# Patient Record
Sex: FEMALE | Race: BLACK OR AFRICAN AMERICAN | NOT HISPANIC OR LATINO | URBAN - METROPOLITAN AREA
[De-identification: names, ages, dates, MRNs, and addresses within clinical notes are randomized per-mention and may not be internally consistent; named-entity substitution may affect disease eponyms.]

---

## 2017-11-15 ENCOUNTER — EMERGENCY (EMERGENCY)
Facility: HOSPITAL | Age: 52
LOS: 0 days | Discharge: HOME | End: 2017-11-15
Attending: EMERGENCY MEDICINE
Payer: COMMERCIAL

## 2017-11-15 VITALS
HEIGHT: 66 IN | HEART RATE: 69 BPM | SYSTOLIC BLOOD PRESSURE: 151 MMHG | TEMPERATURE: 98 F | WEIGHT: 188.05 LBS | OXYGEN SATURATION: 99 % | RESPIRATION RATE: 16 BRPM | DIASTOLIC BLOOD PRESSURE: 91 MMHG

## 2017-11-15 VITALS
DIASTOLIC BLOOD PRESSURE: 83 MMHG | HEART RATE: 81 BPM | RESPIRATION RATE: 17 BRPM | SYSTOLIC BLOOD PRESSURE: 127 MMHG | TEMPERATURE: 97 F | OXYGEN SATURATION: 100 %

## 2017-11-15 DIAGNOSIS — Z88.5 ALLERGY STATUS TO NARCOTIC AGENT: ICD-10-CM

## 2017-11-15 DIAGNOSIS — F32.9 MAJOR DEPRESSIVE DISORDER, SINGLE EPISODE, UNSPECIFIED: ICD-10-CM

## 2017-11-15 DIAGNOSIS — G45.9 TRANSIENT CEREBRAL ISCHEMIC ATTACK, UNSPECIFIED: ICD-10-CM

## 2017-11-15 DIAGNOSIS — R42 DIZZINESS AND GIDDINESS: ICD-10-CM

## 2017-11-15 DIAGNOSIS — I20.9 ANGINA PECTORIS, UNSPECIFIED: ICD-10-CM

## 2017-11-15 DIAGNOSIS — Z79.1 LONG TERM (CURRENT) USE OF NON-STEROIDAL ANTI-INFLAMMATORIES (NSAID): ICD-10-CM

## 2017-11-15 DIAGNOSIS — Z79.01 LONG TERM (CURRENT) USE OF ANTICOAGULANTS: ICD-10-CM

## 2017-11-15 DIAGNOSIS — Z90.710 ACQUIRED ABSENCE OF BOTH CERVIX AND UTERUS: Chronic | ICD-10-CM

## 2017-11-15 DIAGNOSIS — I10 ESSENTIAL (PRIMARY) HYPERTENSION: ICD-10-CM

## 2017-11-15 DIAGNOSIS — D64.9 ANEMIA, UNSPECIFIED: ICD-10-CM

## 2017-11-15 LAB
ALBUMIN SERPL ELPH-MCNC: 3.4 G/DL — SIGNIFICANT CHANGE UP (ref 3.3–5)
ALP SERPL-CCNC: 67 U/L — SIGNIFICANT CHANGE UP (ref 40–120)
ALT FLD-CCNC: 29 U/L — SIGNIFICANT CHANGE UP (ref 12–78)
ANION GAP SERPL CALC-SCNC: 5 MMOL/L — SIGNIFICANT CHANGE UP (ref 5–17)
APPEARANCE UR: CLEAR — SIGNIFICANT CHANGE UP
AST SERPL-CCNC: 19 U/L — SIGNIFICANT CHANGE UP (ref 15–37)
BASOPHILS # BLD AUTO: 0.1 K/UL — SIGNIFICANT CHANGE UP (ref 0–0.2)
BASOPHILS NFR BLD AUTO: 1.2 % — SIGNIFICANT CHANGE UP (ref 0–2)
BILIRUB SERPL-MCNC: 0.2 MG/DL — SIGNIFICANT CHANGE UP (ref 0.2–1.2)
BILIRUB UR-MCNC: NEGATIVE — SIGNIFICANT CHANGE UP
BUN SERPL-MCNC: 11 MG/DL — SIGNIFICANT CHANGE UP (ref 7–23)
CALCIUM SERPL-MCNC: 8.4 MG/DL — LOW (ref 8.5–10.1)
CHLORIDE SERPL-SCNC: 106 MMOL/L — SIGNIFICANT CHANGE UP (ref 96–108)
CO2 SERPL-SCNC: 31 MMOL/L — SIGNIFICANT CHANGE UP (ref 22–31)
COLOR SPEC: YELLOW — SIGNIFICANT CHANGE UP
CREAT SERPL-MCNC: 0.57 MG/DL — SIGNIFICANT CHANGE UP (ref 0.5–1.3)
DIFF PNL FLD: NEGATIVE — SIGNIFICANT CHANGE UP
EOSINOPHIL # BLD AUTO: 0.2 K/UL — SIGNIFICANT CHANGE UP (ref 0–0.5)
EOSINOPHIL NFR BLD AUTO: 3.5 % — SIGNIFICANT CHANGE UP (ref 0–6)
GLUCOSE SERPL-MCNC: 93 MG/DL — SIGNIFICANT CHANGE UP (ref 70–99)
GLUCOSE UR QL: NEGATIVE MG/DL — SIGNIFICANT CHANGE UP
HCG SERPL-ACNC: <1 MIU/ML — SIGNIFICANT CHANGE UP
HCT VFR BLD CALC: 37.7 % — SIGNIFICANT CHANGE UP (ref 34.5–45)
HGB BLD-MCNC: 11.4 G/DL — LOW (ref 11.5–15.5)
KETONES UR-MCNC: NEGATIVE — SIGNIFICANT CHANGE UP
LEUKOCYTE ESTERASE UR-ACNC: NEGATIVE — SIGNIFICANT CHANGE UP
LYMPHOCYTES # BLD AUTO: 2.3 K/UL — SIGNIFICANT CHANGE UP (ref 1–3.3)
LYMPHOCYTES # BLD AUTO: 41.2 % — SIGNIFICANT CHANGE UP (ref 13–44)
MCHC RBC-ENTMCNC: 23.4 PG — LOW (ref 27–34)
MCHC RBC-ENTMCNC: 30.2 GM/DL — LOW (ref 32–36)
MCV RBC AUTO: 77.7 FL — LOW (ref 80–100)
MONOCYTES # BLD AUTO: 0.6 K/UL — SIGNIFICANT CHANGE UP (ref 0–0.9)
MONOCYTES NFR BLD AUTO: 9.9 % — SIGNIFICANT CHANGE UP (ref 2–14)
NEUTROPHILS # BLD AUTO: 2.5 K/UL — SIGNIFICANT CHANGE UP (ref 1.8–7.4)
NEUTROPHILS NFR BLD AUTO: 44.2 % — SIGNIFICANT CHANGE UP (ref 43–77)
NITRITE UR-MCNC: NEGATIVE — SIGNIFICANT CHANGE UP
PH UR: 7 — SIGNIFICANT CHANGE UP (ref 5–8)
PLATELET # BLD AUTO: 123 K/UL — LOW (ref 150–400)
POTASSIUM SERPL-MCNC: 3.6 MMOL/L — SIGNIFICANT CHANGE UP (ref 3.5–5.3)
POTASSIUM SERPL-SCNC: 3.6 MMOL/L — SIGNIFICANT CHANGE UP (ref 3.5–5.3)
PROT SERPL-MCNC: 7.2 GM/DL — SIGNIFICANT CHANGE UP (ref 6–8.3)
PROT UR-MCNC: NEGATIVE MG/DL — SIGNIFICANT CHANGE UP
RBC # BLD: 4.86 M/UL — SIGNIFICANT CHANGE UP (ref 3.8–5.2)
RBC # FLD: 13 % — SIGNIFICANT CHANGE UP (ref 11–15)
SODIUM SERPL-SCNC: 142 MMOL/L — SIGNIFICANT CHANGE UP (ref 135–145)
SP GR SPEC: 1.01 — SIGNIFICANT CHANGE UP (ref 1.01–1.02)
UROBILINOGEN FLD QL: NEGATIVE MG/DL — SIGNIFICANT CHANGE UP
WBC # BLD: 5.6 K/UL — SIGNIFICANT CHANGE UP (ref 3.8–10.5)
WBC # FLD AUTO: 5.6 K/UL — SIGNIFICANT CHANGE UP (ref 3.8–10.5)

## 2017-11-15 PROCEDURE — 70450 CT HEAD/BRAIN W/O DYE: CPT | Mod: 26

## 2017-11-15 PROCEDURE — 99285 EMERGENCY DEPT VISIT HI MDM: CPT

## 2017-11-15 RX ORDER — MECLIZINE HCL 12.5 MG
50 TABLET ORAL ONCE
Qty: 0 | Refills: 0 | Status: COMPLETED | OUTPATIENT
Start: 2017-11-15 | End: 2017-11-15

## 2017-11-15 RX ORDER — MECLIZINE HCL 12.5 MG
1 TABLET ORAL
Qty: 15 | Refills: 0 | OUTPATIENT
Start: 2017-11-15 | End: 2017-11-20

## 2017-11-15 RX ADMIN — Medication 50 MILLIGRAM(S): at 13:31

## 2017-11-15 NOTE — ED ADULT TRIAGE NOTE - CHIEF COMPLAINT QUOTE
Pt states she was given an Abilify injection on 11/5 and since then she has been dizzy restless and unable to focus. Pt c/o chest discomfort with right upper back pain

## 2017-11-15 NOTE — ED PROVIDER NOTE - OBJECTIVE STATEMENT
52 year old female with PMH of HTN, TIA hx depression with anemia, angina hx presenting to ED due to room spinning sensation for past few days, she thought may be caused by abilify injection - however injection was 10 days ago, not likely to be cause, pt has had some ringing on left ear in recent past as well. Pt was admitted to psychiatric services last time due to hearing voices telling her to hurt herself, today hears voices but ascribes no suicidal or homicidal thoughts would not like further meds for voices.

## 2017-11-15 NOTE — ED PROVIDER NOTE - MEDICAL DECISION MAKING DETAILS
pt complaining of dizziness -vertigo given meclizine - pt complaining of dizziness -vertigo given meclizine - improved and will dc with Dr. Fontaine followup - likely [place] vs positional vertigo.

## 2017-11-16 LAB
CULTURE RESULTS: SIGNIFICANT CHANGE UP
SPECIMEN SOURCE: SIGNIFICANT CHANGE UP

## 2018-05-13 ENCOUNTER — EMERGENCY (EMERGENCY)
Facility: HOSPITAL | Age: 53
LOS: 0 days | Discharge: HOME | End: 2018-05-13
Attending: EMERGENCY MEDICINE
Payer: COMMERCIAL

## 2018-05-13 VITALS
DIASTOLIC BLOOD PRESSURE: 96 MMHG | TEMPERATURE: 99 F | RESPIRATION RATE: 16 BRPM | SYSTOLIC BLOOD PRESSURE: 171 MMHG | OXYGEN SATURATION: 97 % | HEIGHT: 66 IN | HEART RATE: 83 BPM | WEIGHT: 203.05 LBS

## 2018-05-13 DIAGNOSIS — Z90.710 ACQUIRED ABSENCE OF BOTH CERVIX AND UTERUS: Chronic | ICD-10-CM
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
## 2018-05-13 DIAGNOSIS — I20.9 ANGINA PECTORIS, UNSPECIFIED: ICD-10-CM

## 2018-05-13 DIAGNOSIS — Z79.1 LONG TERM (CURRENT) USE OF NON-STEROIDAL ANTI-INFLAMMATORIES (NSAID): ICD-10-CM

## 2018-05-13 DIAGNOSIS — M25.511 PAIN IN RIGHT SHOULDER: ICD-10-CM

## 2018-05-13 DIAGNOSIS — S43.401A UNSPECIFIED SPRAIN OF RIGHT SHOULDER JOINT, INITIAL ENCOUNTER: ICD-10-CM

## 2018-05-13 DIAGNOSIS — Z86.73 PERSONAL HISTORY OF TRANSIENT ISCHEMIC ATTACK (TIA), AND CEREBRAL INFARCTION WITHOUT RESIDUAL DEFICITS: ICD-10-CM

## 2018-05-13 DIAGNOSIS — Z88.8 ALLERGY STATUS TO OTHER DRUGS, MEDICAMENTS AND BIOLOGICAL SUBSTANCES: ICD-10-CM

## 2018-05-13 DIAGNOSIS — I10 ESSENTIAL (PRIMARY) HYPERTENSION: ICD-10-CM

## 2018-05-13 DIAGNOSIS — F32.9 MAJOR DEPRESSIVE DISORDER, SINGLE EPISODE, UNSPECIFIED: ICD-10-CM

## 2018-05-13 DIAGNOSIS — Z79.02 LONG TERM (CURRENT) USE OF ANTITHROMBOTICS/ANTIPLATELETS: ICD-10-CM

## 2018-05-13 DIAGNOSIS — Z79.899 OTHER LONG TERM (CURRENT) DRUG THERAPY: ICD-10-CM
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
## 2018-05-13 DIAGNOSIS — D64.9 ANEMIA, UNSPECIFIED: ICD-10-CM

## 2018-05-13 PROCEDURE — 99283 EMERGENCY DEPT VISIT LOW MDM: CPT

## 2018-05-13 PROCEDURE — 73030 X-RAY EXAM OF SHOULDER: CPT | Mod: 26,RT

## 2018-05-13 RX ORDER — IBUPROFEN 200 MG
600 TABLET ORAL ONCE
Qty: 0 | Refills: 0 | Status: COMPLETED | OUTPATIENT
Start: 2018-05-13 | End: 2018-05-13

## 2018-05-13 RX ADMIN — Medication 600 MILLIGRAM(S): at 16:41

## 2018-05-13 NOTE — ED ADULT TRIAGE NOTE - CHIEF COMPLAINT QUOTE
pt c/o throbbing right shoulder pain for a few days denies any injury. Pt c/o painful ROM, denies any chest pain at triage

## 2018-05-13 NOTE — ED PROVIDER NOTE - CARE PLAN
Principal Discharge DX:	Shoulder strain, right, initial encounter  Secondary Diagnosis:	Elevated blood pressure reading

## 2019-07-02 ENCOUNTER — EMERGENCY (EMERGENCY)
Facility: HOSPITAL | Age: 54
LOS: 0 days | Discharge: HOME | End: 2019-07-03
Attending: EMERGENCY MEDICINE
Payer: COMMERCIAL

## 2019-07-02 VITALS
WEIGHT: 162.92 LBS | OXYGEN SATURATION: 100 % | HEART RATE: 136 BPM | SYSTOLIC BLOOD PRESSURE: 174 MMHG | TEMPERATURE: 99 F | HEIGHT: 65 IN | DIASTOLIC BLOOD PRESSURE: 85 MMHG | RESPIRATION RATE: 22 BRPM

## 2019-07-02 DIAGNOSIS — R42 DIZZINESS AND GIDDINESS: ICD-10-CM

## 2019-07-02 DIAGNOSIS — R00.2 PALPITATIONS: ICD-10-CM

## 2019-07-02 DIAGNOSIS — I10 ESSENTIAL (PRIMARY) HYPERTENSION: ICD-10-CM

## 2019-07-02 DIAGNOSIS — Z88.8 ALLERGY STATUS TO OTHER DRUGS, MEDICAMENTS AND BIOLOGICAL SUBSTANCES: ICD-10-CM

## 2019-07-02 DIAGNOSIS — Z86.73 PERSONAL HISTORY OF TRANSIENT ISCHEMIC ATTACK (TIA), AND CEREBRAL INFARCTION WITHOUT RESIDUAL DEFICITS: ICD-10-CM

## 2019-07-02 DIAGNOSIS — R53.1 WEAKNESS: ICD-10-CM

## 2019-07-02 DIAGNOSIS — F99 MENTAL DISORDER, NOT OTHERWISE SPECIFIED: ICD-10-CM

## 2019-07-02 DIAGNOSIS — E87.6 HYPOKALEMIA: ICD-10-CM

## 2019-07-02 DIAGNOSIS — Z90.710 ACQUIRED ABSENCE OF BOTH CERVIX AND UTERUS: Chronic | ICD-10-CM

## 2019-07-02 LAB
ALBUMIN SERPL ELPH-MCNC: 3.6 G/DL — SIGNIFICANT CHANGE UP (ref 3.3–5)
ALP SERPL-CCNC: 57 U/L — SIGNIFICANT CHANGE UP (ref 40–120)
ALT FLD-CCNC: 19 U/L — SIGNIFICANT CHANGE UP (ref 12–78)
AMPHET UR-MCNC: NEGATIVE — SIGNIFICANT CHANGE UP
ANION GAP SERPL CALC-SCNC: 12 MMOL/L — SIGNIFICANT CHANGE UP (ref 5–17)
APPEARANCE UR: CLEAR — SIGNIFICANT CHANGE UP
APTT BLD: 31.7 SEC — SIGNIFICANT CHANGE UP (ref 28.5–37)
AST SERPL-CCNC: 17 U/L — SIGNIFICANT CHANGE UP (ref 15–37)
BARBITURATES UR SCN-MCNC: NEGATIVE — SIGNIFICANT CHANGE UP
BENZODIAZ UR-MCNC: NEGATIVE — SIGNIFICANT CHANGE UP
BILIRUB SERPL-MCNC: 0.2 MG/DL — SIGNIFICANT CHANGE UP (ref 0.2–1.2)
BILIRUB UR-MCNC: NEGATIVE — SIGNIFICANT CHANGE UP
BUN SERPL-MCNC: 11 MG/DL — SIGNIFICANT CHANGE UP (ref 7–23)
CALCIUM SERPL-MCNC: 8.5 MG/DL — SIGNIFICANT CHANGE UP (ref 8.5–10.1)
CHLORIDE SERPL-SCNC: 104 MMOL/L — SIGNIFICANT CHANGE UP (ref 96–108)
CK MB CFR SERPL CALC: 1.5 NG/ML — SIGNIFICANT CHANGE UP (ref 0.5–3.6)
CO2 SERPL-SCNC: 25 MMOL/L — SIGNIFICANT CHANGE UP (ref 22–31)
COCAINE METAB.OTHER UR-MCNC: NEGATIVE — SIGNIFICANT CHANGE UP
COLOR SPEC: YELLOW — SIGNIFICANT CHANGE UP
CREAT SERPL-MCNC: 0.76 MG/DL — SIGNIFICANT CHANGE UP (ref 0.5–1.3)
D DIMER BLD IA.RAPID-MCNC: 253 NG/ML DDU — HIGH
DIFF PNL FLD: NEGATIVE — SIGNIFICANT CHANGE UP
ETHANOL SERPL-MCNC: <10 MG/DL — SIGNIFICANT CHANGE UP (ref 0–10)
GLUCOSE BLDC GLUCOMTR-MCNC: 163 MG/DL — HIGH (ref 70–99)
GLUCOSE SERPL-MCNC: 180 MG/DL — HIGH (ref 70–99)
GLUCOSE UR QL: NEGATIVE MG/DL — SIGNIFICANT CHANGE UP
HCG SERPL-ACNC: 1 MIU/ML — SIGNIFICANT CHANGE UP
HCT VFR BLD CALC: 37.2 % — SIGNIFICANT CHANGE UP (ref 34.5–45)
HGB BLD-MCNC: 11.2 G/DL — LOW (ref 11.5–15.5)
INR BLD: 1.07 RATIO — SIGNIFICANT CHANGE UP (ref 0.88–1.16)
KETONES UR-MCNC: NEGATIVE — SIGNIFICANT CHANGE UP
LACTATE SERPL-SCNC: 1.7 MMOL/L — SIGNIFICANT CHANGE UP (ref 0.7–2)
LEUKOCYTE ESTERASE UR-ACNC: NEGATIVE — SIGNIFICANT CHANGE UP
MCHC RBC-ENTMCNC: 22.3 PG — LOW (ref 27–34)
MCHC RBC-ENTMCNC: 30.1 GM/DL — LOW (ref 32–36)
MCV RBC AUTO: 74.1 FL — LOW (ref 80–100)
METHADONE UR-MCNC: NEGATIVE — SIGNIFICANT CHANGE UP
NITRITE UR-MCNC: NEGATIVE — SIGNIFICANT CHANGE UP
NRBC # BLD: 0 /100 WBCS — SIGNIFICANT CHANGE UP (ref 0–0)
NT-PROBNP SERPL-SCNC: 107 PG/ML — SIGNIFICANT CHANGE UP (ref 0–125)
OPIATES UR-MCNC: NEGATIVE — SIGNIFICANT CHANGE UP
PCP SPEC-MCNC: SIGNIFICANT CHANGE UP
PCP UR-MCNC: NEGATIVE — SIGNIFICANT CHANGE UP
PH UR: 7 — SIGNIFICANT CHANGE UP (ref 5–8)
PLATELET # BLD AUTO: 191 K/UL — SIGNIFICANT CHANGE UP (ref 150–400)
POTASSIUM SERPL-MCNC: 3 MMOL/L — LOW (ref 3.5–5.3)
POTASSIUM SERPL-SCNC: 3 MMOL/L — LOW (ref 3.5–5.3)
PROT SERPL-MCNC: 7.9 GM/DL — SIGNIFICANT CHANGE UP (ref 6–8.3)
PROT UR-MCNC: NEGATIVE MG/DL — SIGNIFICANT CHANGE UP
PROTHROM AB SERPL-ACNC: 12 SEC — SIGNIFICANT CHANGE UP (ref 10–12.9)
RBC # BLD: 5.02 M/UL — SIGNIFICANT CHANGE UP (ref 3.8–5.2)
RBC # FLD: 14.6 % — HIGH (ref 10.3–14.5)
SODIUM SERPL-SCNC: 141 MMOL/L — SIGNIFICANT CHANGE UP (ref 135–145)
SP GR SPEC: 1.01 — SIGNIFICANT CHANGE UP (ref 1.01–1.02)
THC UR QL: NEGATIVE — SIGNIFICANT CHANGE UP
TROPONIN I SERPL-MCNC: <.015 NG/ML — SIGNIFICANT CHANGE UP (ref 0.01–0.04)
UROBILINOGEN FLD QL: NEGATIVE MG/DL — SIGNIFICANT CHANGE UP
WBC # BLD: 6.84 K/UL — SIGNIFICANT CHANGE UP (ref 3.8–10.5)
WBC # FLD AUTO: 6.84 K/UL — SIGNIFICANT CHANGE UP (ref 3.8–10.5)

## 2019-07-02 PROCEDURE — 99285 EMERGENCY DEPT VISIT HI MDM: CPT

## 2019-07-02 PROCEDURE — 70450 CT HEAD/BRAIN W/O DYE: CPT | Mod: 26

## 2019-07-02 PROCEDURE — 93010 ELECTROCARDIOGRAM REPORT: CPT

## 2019-07-02 PROCEDURE — 71045 X-RAY EXAM CHEST 1 VIEW: CPT | Mod: 26

## 2019-07-02 RX ORDER — SODIUM CHLORIDE 9 MG/ML
1000 INJECTION INTRAMUSCULAR; INTRAVENOUS; SUBCUTANEOUS ONCE
Refills: 0 | Status: COMPLETED | OUTPATIENT
Start: 2019-07-02 | End: 2019-07-02

## 2019-07-02 RX ORDER — POTASSIUM CHLORIDE 20 MEQ
10 PACKET (EA) ORAL ONCE
Refills: 0 | Status: COMPLETED | OUTPATIENT
Start: 2019-07-02 | End: 2019-07-02

## 2019-07-02 RX ORDER — LABETALOL HCL 100 MG
10 TABLET ORAL ONCE
Refills: 0 | Status: COMPLETED | OUTPATIENT
Start: 2019-07-02 | End: 2019-07-02

## 2019-07-02 RX ORDER — POTASSIUM CHLORIDE 20 MEQ
40 PACKET (EA) ORAL ONCE
Refills: 0 | Status: COMPLETED | OUTPATIENT
Start: 2019-07-02 | End: 2019-07-02

## 2019-07-02 RX ADMIN — Medication 100 MILLIEQUIVALENT(S): at 23:29

## 2019-07-02 RX ADMIN — Medication 10 MILLIGRAM(S): at 23:30

## 2019-07-02 RX ADMIN — Medication 40 MILLIEQUIVALENT(S): at 23:29

## 2019-07-02 RX ADMIN — SODIUM CHLORIDE 1000 MILLILITER(S): 9 INJECTION INTRAMUSCULAR; INTRAVENOUS; SUBCUTANEOUS at 19:42

## 2019-07-02 NOTE — ED PROVIDER NOTE - PROGRESS NOTE DETAILS
Results reported to patient--hypok replaced, CTs show no acute pathology, urine clean, other labs wnl  Pt. reports feeling better after meds, no events on monitor during stay, vital normalized   pt. agrees to f/u with primary care outpt. urgently for f/u  pt. understands to return to ED if symptoms worsen; will d/c w/ potassium supplementation Results reported to patient--hypok replaced, CTs show no acute pathology, urine clean, other labs wnl  Pt. reports feeling better after meds, no events on monitor during stay, vitals normalized   pt. agrees to f/u with primary care outpt. urgently for f/u  pt. understands to return to ED if symptoms worsen; will d/c w/ potassium supplementation  pt. verbalizes understanding that hypokalemia can cause severe effects if untreated palpitations and even cardiac arrest, pt. understands the importance of taking potassium supplementation and f/u with pcp

## 2019-07-02 NOTE — ED PROVIDER NOTE - PHYSICAL EXAMINATION
Vitals: tachy at 136, htn at 174/85, otherwise WNL  Gen: AAOx3, NAD, sitting comfortably in stretcher, calm, cooperative, non-toxic   Head: ncat, perrla, eomi b/l  Neck: supple, no lymphadenopathy, no midline deviation  Heart: rrr, no m/r/g  Lungs: CTA b/l, no rales/ronchi/wheezes  Abd: soft, nontender, non-distended, no rebound or guarding  Ext: no clubbing/cyanosis/edema  Neuro: sensation and muscle strength intact b/l, steady gait, CN2-12 intact b/l, no focal deficits

## 2019-07-02 NOTE — ED PROVIDER NOTE - INTERPRETATION
EKG performed in ED, sinus tach at 103, No acute ischemic changes, normal intervals, generally flat TWs noted

## 2019-07-02 NOTE — ED PROVIDER NOTE - CARE PROVIDER_API CALL
Da Alcantar)  Internal Medicine  [address], [address]  [place] [address]  Phone: [phone]  Fax: [phone]  Follow Up Time: 1-3 Days

## 2019-07-02 NOTE — ED PROVIDER NOTE - OBJECTIVE STATEMENT
53 yo F with palpitations, lightheadedness, general weakness, palpitations.  Symptoms started at 7pm while pt. was on lunch break at work.  Pt. presents to ER initially tachycardic in 150's, which improved spontaneously.  Pt. denies inciting event.  When symptoms began she felt her heart was beating out of her chest and she felt sob.  Stroke eval called at triage, no neuro deficits noted, stroke alert not called.  No other complaints.   ROS: negative for fever, cough, headache, chest pain, abd pain, nausea, vomiting, diarrhea, rash, and paresthesia--all other systems reviewed are negative.   PMH: negative; Meds: Denies; SH: Denies smoking/drinking/drug use 55 yo F with palpitations, lightheadedness, general weakness, palpitations.  Symptoms started at 7pm while pt. was on lunch break at work.  Pt. presents to ER initially tachycardic in 150's, which improved spontaneously.  Pt. denies inciting event.  When symptoms began she felt her heart was beating out of her chest and she felt sob.  Stroke eval called at triage, no neuro deficits noted, stroke alert not called.  No other complaints.   ROS: negative for fever, cough, headache, chest pain, abd pain, nausea, vomiting, diarrhea, rash, and paresthesia--all other systems reviewed are negative.   PMH: HTN, hx CVA, psych; Meds: rispirdal, zoloft, atenolol, procardia, aldactone, plavix, ecotrin, depakote; SH: Denies smoking/drinking/drug use 53 yo F with palpitations, lightheadedness, general weakness, palpitations.  Symptoms started at 7pm while pt. was on lunch break at work.  Pt. presents to ER initially tachycardic in 150's, which improved spontaneously.  Pt. denies inciting event.  When symptoms began she felt her heart was beating out of her chest and she felt sob.  Stroke eval called at triage, no neuro deficits noted, stroke alert not called.  No other complaints.   ROS: negative for fever, cough, headache, chest pain, abd pain, nausea, vomiting, diarrhea, rash, and paresthesia--all other systems reviewed are negative.   PMH: HTN, hx CVA, psych; Meds: Risperdal, zoloft, atenolol, procardia, aldactone, Plavix, ecotrin, Depakote; SH: Denies smoking/drinking/drug use 53 yo F with palpitations, lightheadedness, general weakness, palpitations.  Symptoms started at 7pm while pt. was on lunch break at work.  Pt. presents to ER initially tachycardic in 150's, which improved spontaneously.  Pt. denies inciting event.  When symptoms began she felt her heart was beating out of her chest and she felt sob.  Stroke eval called at triage, no neuro deficits noted, stroke alert not called.  No other complaints.   ROS: negative for fever, cough, headache, chest pain, abd pain, nausea, vomiting, diarrhea, rash, and paresthesia--all other systems reviewed are negative.   PMH: HTN, hx CVA, psych, hypokalemia (when asked about hx, not on replacement); Meds: Risperdal, zoloft, atenolol, procardia, aldactone, Plavix, ecotrin, Depakote; SH: Denies smoking/drinking/drug use

## 2019-07-02 NOTE — ED PROVIDER NOTE - CARE PLAN
Principal Discharge DX:	Palpitations Principal Discharge DX:	Palpitations  Secondary Diagnosis:	Hypokalemia

## 2019-07-02 NOTE — ED PROVIDER NOTE - CLINICAL SUMMARY MEDICAL DECISION MAKING FREE TEXT BOX
55 yo F with palpitations, r/o ACS, etoh intox, drug intox, electrolyte disturbance, pregnancy, 53 yo F with palpitations, r/o ACS, etoh intox, drug intox, electrolyte disturbance, pregnancy, acute ischemia  -basic labs, coags, trop, ckmb, drug screen, ua, cx, dimer, hcg, CTA chest if positive dimer, cxr, ct brain for weakness, control pressure labetalol prn, ns hydration, potassium repletion prn  -f/u results, reeval

## 2019-07-02 NOTE — ED ADULT TRIAGE NOTE - CHIEF COMPLAINT QUOTE
Patient brought down on stretcher: patient was on Lunch break when she complained of right sided weakness, dizziness and palpitations. Symptoms began at 1855hr. Per accompanying staff HR was 153. Currently patient reports palpitations and dizziness. No apparent neuro deficits. . Patient with history of CVA, HTN and diabetes. Stroke evaluation by Dr. Lee

## 2019-07-03 ENCOUNTER — INBOUND DOCUMENT (OUTPATIENT)
Age: 54
End: 2019-07-03

## 2019-07-03 VITALS
OXYGEN SATURATION: 100 % | RESPIRATION RATE: 16 BRPM | SYSTOLIC BLOOD PRESSURE: 143 MMHG | HEART RATE: 82 BPM | TEMPERATURE: 99 F | DIASTOLIC BLOOD PRESSURE: 87 MMHG

## 2019-07-03 LAB
CULTURE RESULTS: SIGNIFICANT CHANGE UP
SPECIMEN SOURCE: SIGNIFICANT CHANGE UP

## 2019-07-03 PROCEDURE — 71275 CT ANGIOGRAPHY CHEST: CPT | Mod: 26

## 2019-07-03 PROCEDURE — 93010 ELECTROCARDIOGRAM REPORT: CPT

## 2019-07-03 RX ORDER — POTASSIUM CHLORIDE 20 MEQ
1 PACKET (EA) ORAL
Qty: 14 | Refills: 0
Start: 2019-07-03 | End: 2019-07-09

## 2019-11-05 ENCOUNTER — EMERGENCY (EMERGENCY)
Facility: HOSPITAL | Age: 54
LOS: 0 days | Discharge: HOME | End: 2019-11-05
Attending: EMERGENCY MEDICINE
Payer: COMMERCIAL

## 2019-11-05 VITALS
DIASTOLIC BLOOD PRESSURE: 71 MMHG | TEMPERATURE: 99 F | SYSTOLIC BLOOD PRESSURE: 127 MMHG | OXYGEN SATURATION: 100 % | RESPIRATION RATE: 16 BRPM | HEART RATE: 65 BPM

## 2019-11-05 VITALS
HEIGHT: 66 IN | WEIGHT: 188.94 LBS | DIASTOLIC BLOOD PRESSURE: 97 MMHG | RESPIRATION RATE: 18 BRPM | HEART RATE: 80 BPM | OXYGEN SATURATION: 100 % | SYSTOLIC BLOOD PRESSURE: 167 MMHG | TEMPERATURE: 99 F

## 2019-11-05 DIAGNOSIS — D64.9 ANEMIA, UNSPECIFIED: ICD-10-CM

## 2019-11-05 DIAGNOSIS — Z90.710 ACQUIRED ABSENCE OF BOTH CERVIX AND UTERUS: Chronic | ICD-10-CM

## 2019-11-05 DIAGNOSIS — Z79.01 LONG TERM (CURRENT) USE OF ANTICOAGULANTS: ICD-10-CM

## 2019-11-05 DIAGNOSIS — I10 ESSENTIAL (PRIMARY) HYPERTENSION: ICD-10-CM

## 2019-11-05 DIAGNOSIS — Z86.73 PERSONAL HISTORY OF TRANSIENT ISCHEMIC ATTACK (TIA), AND CEREBRAL INFARCTION WITHOUT RESIDUAL DEFICITS: ICD-10-CM

## 2019-11-05 DIAGNOSIS — R07.9 CHEST PAIN, UNSPECIFIED: ICD-10-CM

## 2019-11-05 DIAGNOSIS — Z88.8 ALLERGY STATUS TO OTHER DRUGS, MEDICAMENTS AND BIOLOGICAL SUBSTANCES STATUS: ICD-10-CM

## 2019-11-05 DIAGNOSIS — K21.9 GASTRO-ESOPHAGEAL REFLUX DISEASE WITHOUT ESOPHAGITIS: ICD-10-CM

## 2019-11-05 DIAGNOSIS — F32.9 MAJOR DEPRESSIVE DISORDER, SINGLE EPISODE, UNSPECIFIED: ICD-10-CM

## 2019-11-05 DIAGNOSIS — I20.9 ANGINA PECTORIS, UNSPECIFIED: ICD-10-CM

## 2019-11-05 LAB
ALBUMIN SERPL ELPH-MCNC: 3.6 G/DL — SIGNIFICANT CHANGE UP (ref 3.3–5)
ALP SERPL-CCNC: 51 U/L — SIGNIFICANT CHANGE UP (ref 40–120)
ALT FLD-CCNC: 17 U/L — SIGNIFICANT CHANGE UP (ref 12–78)
ANION GAP SERPL CALC-SCNC: 7 MMOL/L — SIGNIFICANT CHANGE UP (ref 5–17)
AST SERPL-CCNC: 12 U/L — LOW (ref 15–37)
BILIRUB SERPL-MCNC: 0.3 MG/DL — SIGNIFICANT CHANGE UP (ref 0.2–1.2)
BUN SERPL-MCNC: 12 MG/DL — SIGNIFICANT CHANGE UP (ref 7–23)
CALCIUM SERPL-MCNC: 9 MG/DL — SIGNIFICANT CHANGE UP (ref 8.5–10.1)
CHLORIDE SERPL-SCNC: 106 MMOL/L — SIGNIFICANT CHANGE UP (ref 96–108)
CO2 SERPL-SCNC: 29 MMOL/L — SIGNIFICANT CHANGE UP (ref 22–31)
CREAT SERPL-MCNC: 0.65 MG/DL — SIGNIFICANT CHANGE UP (ref 0.5–1.3)
D DIMER BLD IA.RAPID-MCNC: 168 NG/ML DDU — SIGNIFICANT CHANGE UP
GLUCOSE SERPL-MCNC: 82 MG/DL — SIGNIFICANT CHANGE UP (ref 70–99)
HCT VFR BLD CALC: 35.9 % — SIGNIFICANT CHANGE UP (ref 34.5–45)
HGB BLD-MCNC: 10.9 G/DL — LOW (ref 11.5–15.5)
MAGNESIUM SERPL-MCNC: 2.1 MG/DL — SIGNIFICANT CHANGE UP (ref 1.6–2.6)
MCHC RBC-ENTMCNC: 22.4 PG — LOW (ref 27–34)
MCHC RBC-ENTMCNC: 30.4 GM/DL — LOW (ref 32–36)
MCV RBC AUTO: 73.7 FL — LOW (ref 80–100)
NRBC # BLD: 0 /100 WBCS — SIGNIFICANT CHANGE UP (ref 0–0)
PLATELET # BLD AUTO: 184 K/UL — SIGNIFICANT CHANGE UP (ref 150–400)
POTASSIUM SERPL-MCNC: 3.8 MMOL/L — SIGNIFICANT CHANGE UP (ref 3.5–5.3)
POTASSIUM SERPL-SCNC: 3.8 MMOL/L — SIGNIFICANT CHANGE UP (ref 3.5–5.3)
PROT SERPL-MCNC: 7.5 GM/DL — SIGNIFICANT CHANGE UP (ref 6–8.3)
RBC # BLD: 4.87 M/UL — SIGNIFICANT CHANGE UP (ref 3.8–5.2)
RBC # FLD: 14.8 % — HIGH (ref 10.3–14.5)
SODIUM SERPL-SCNC: 142 MMOL/L — SIGNIFICANT CHANGE UP (ref 135–145)
TROPONIN I SERPL-MCNC: <.015 NG/ML — SIGNIFICANT CHANGE UP (ref 0.01–0.04)
TROPONIN I SERPL-MCNC: <.015 NG/ML — SIGNIFICANT CHANGE UP (ref 0.01–0.04)
WBC # BLD: 4.96 K/UL — SIGNIFICANT CHANGE UP (ref 3.8–10.5)
WBC # FLD AUTO: 4.96 K/UL — SIGNIFICANT CHANGE UP (ref 3.8–10.5)

## 2019-11-05 PROCEDURE — 93010 ELECTROCARDIOGRAM REPORT: CPT

## 2019-11-05 PROCEDURE — 99285 EMERGENCY DEPT VISIT HI MDM: CPT

## 2019-11-05 PROCEDURE — 71045 X-RAY EXAM CHEST 1 VIEW: CPT | Mod: 26

## 2019-11-05 RX ORDER — FAMOTIDINE 10 MG/ML
20 INJECTION INTRAVENOUS ONCE
Refills: 0 | Status: COMPLETED | OUTPATIENT
Start: 2019-11-05 | End: 2019-11-05

## 2019-11-05 RX ADMIN — FAMOTIDINE 20 MILLIGRAM(S): 10 INJECTION INTRAVENOUS at 16:48

## 2019-11-05 RX ADMIN — Medication 30 MILLILITER(S): at 16:48

## 2019-11-05 NOTE — ED ADULT NURSE NOTE - CHIEF COMPLAINT QUOTE
pt brought in by Staff from [facility]  started around 3pm and high blood pressure denies any SOB, hx htn TIA

## 2019-11-05 NOTE — ED PROVIDER NOTE - PHYSICAL EXAMINATION
Gen: Alert, Well appearing. NAD    Head: NC, AT, PERRL, normal lids/conjunctiva   ENT: Bilateral TM WNL, patent oropharynx without erythema/exudate, uvula midline  Neck: supple, no tenderness/meningismus  Pulm: Bilateral clear BS, normal resp effort  CV: RRR, no M/R/G, +dist pulses   Abd: soft, NT/ND, +BS, no guarding/rebound tenderness  Mskel: no edema/erythema/cyanosis   Skin: no rash, no bruising  Neuro: AAOx3, no sensory/motor deficits, CN 2-12 intact

## 2019-11-05 NOTE — ED PROVIDER NOTE - OBJECTIVE STATEMENT
55yo female with PMH HTN, TIA, depression, anemia, angina history presents with left sided burning cp and not feeling well upon getting to work today at 3pm (at [employer]. denies sob, palpitations, NV, dizziness, radiation of pain. Pt well prior. Pt has loop recorder for palpitations    ROS: No fever/chills. No photophobia/eye pain/changes in vision, No ear pain/sore throat/dysphagia, +chest pain/ no palpitations. No SOB/cough. No abdominal pain, No N/V/D, no black/bloody bm. No dysuria/frequency/discharge, No headache. No Dizziness.  No rash.  No numbness/tingling/weakness.

## 2019-11-05 NOTE — ED PROVIDER NOTE - PROGRESS NOTE DETAILS
pt signed out to me from dr damon, pt presented with chest pain, initial CE's are negative, second set is due at 8:20 ce #2 is negative, pt told to follow up with her pmd and cardiologist

## 2019-11-05 NOTE — ED ADULT NURSE NOTE - NSIMPLEMENTINTERV_GEN_ALL_ED
Implemented All Universal Safety Interventions:  [place] to call system. Call bell, personal items and telephone within reach. Instruct patient to call for assistance. Room bathroom lighting operational. Non-slip footwear when patient is off stretcher. Physically safe environment: no spills, clutter or unnecessary equipment. Stretcher in lowest position, wheels locked, appropriate side rails in place.

## 2019-11-05 NOTE — ED PROVIDER NOTE - PATIENT PORTAL LINK FT
You can access the FollowMyHealth Patient Portal offered by [facility] by registering at the following website: [email or web address]. By joining [facility]’s FollowMyHealth portal, you will also be able to view your health information using other applications (apps) compatible with our system.

## 2019-11-05 NOTE — ED PROVIDER NOTE - CLINICAL SUMMARY MEDICAL DECISION MAKING FREE TEXT BOX
pt nontoxic, pain resolved with GI cocktail, Initial CE neg. pending repeat CE. Patient signed out to incoming physician.  All decisions regarding the progression of care will be made at their discretion.

## 2019-11-05 NOTE — ED ADULT NURSE NOTE - OBJECTIVE STATEMENT
pt brought in by Staff from [facility]  started around 3pm and high blood pressure denies any SOB, hx htn TIA

## 2019-11-05 NOTE — ED ADULT TRIAGE NOTE - CHIEF COMPLAINT QUOTE
pt brought in by Staff from [facility]  started around 3pm and high blood pressure denies any SOB, hx htn TIA pt brought in by Staff from [facility] c/o chest pain  started around 3pm and high blood pressure denies any SOB, hx htn TIA

## 2020-04-25 ENCOUNTER — MESSAGE (OUTPATIENT)
Age: 55
End: 2020-04-25

## 2020-05-15 LAB
SARS-COV-2 IGG SERPL IA-ACNC: <0.1 INDEX
SARS-COV-2 IGG SERPL QL IA: NEGATIVE

## 2020-05-17 NOTE — ED ADULT TRIAGE NOTE - IDEAL BODY WEIGHT(KG)
Pharmacist Admission Medication Reconciliation Pending Note    Prior to Admission Medications were reviewed by the pharmacist and pended for provider review during admission medication reconciliation.    Medications were pended by the pharmacist at this time as follows:    Pended Admission Order Reconciliation Actions - Yoli Mcclain [facility] 5/17/2020 11:16 AM     Order Name Action Reordered As    albuterol (PROAIR HFA) 108 (90 Base) MCG/ACT inhaler Order for Admission albuterol inhaler 2 puff    polyethylene glycol (MIRALAX) powder Order for Admission polyethylene glycol (MIRALAX) packet 17 g    acetaminophen (TYLENOL) 325 MG tablet Order for Admission acetaminophen (TYLENOL) tablet 650 mg    omeprazole (PRILOSEC) 20 MG capsule Order for Admission pantoprazole (PROTONIX) EC tablet 40 mg    donepezil (ARICEPT) 10 MG tablet Order for Admission donepezil (ARICEPT) tablet 10 mg    magnesium hydroxide (MILK OF MAGNESIA) 400 MG/5ML suspension Order for Admission magnesium hydroxide (MILK OF MAGNESIA) 400 MG/5ML suspension 5 mL    mirtazapine (REMERON) 7.5 MG tablet Order for Admission mirtazapine (REMERON) tablet 7.5 mg    memantine (NAMENDA XR) 28 MG extended-release capsule Order for Admission memantine (NAMENDA) tablet 10 mg    ergocalciferol (DRISDOL) 1.25 mg (50,000 units) capsule Order for Admission ergocalciferol (DRISDOL) 1.25 MG (61018 UT) capsule 1.25 mg            Orders Pended To Continue For Hospital Stay     ID Description Pended By When Reason    [id number] acetaminophen (TYLENOL) tablet 650 mg-[facility] 6 HOURS [facility]     [id number] albuterol inhaler 2 puff-EVERY 4 HOURS PRN Yoli Mcclain [facility] 05/17/20 1116     [id number] donepezil (ARICEPT) tablet 10 mg-EVERY EVENING [facility]     [id number] ergocalciferol (DRISDOL) 1.25 MG (32607 UT) capsule 1.25 mg-1 DAY A WEEK [facility] 1116     [id number] magnesium hydroxide (MILK OF  MAGNESIA) 400 MG/5ML suspension 5 [facility]6     [id number] memantine (NAMENDA) tablet 10 mg-EVERY 12 HOURS [facility]EDULED [facility]20 1116     [id number] mirtazapine (REMERON) tablet 7.5 mg-NIGHTLY [facility]/17[facility]20 1116     [id number] pantoprazole (PROTONIX) EC tablet 40 mg-DAILY BEFORE BREAKFAST [facility] 05/17/20 111     [id number] polyethylene glycol (MIRALAX) packet 17 g-DAILY [facility]/17[facility]20 1116             Pharmacist Notations:           Orders that are ultimately reconciled and signed during admission medication reconciliation may differ from the pended actions above.    Please contact the pharmacist for questions.    Yoli Mcclain [facility]  5/17/2020 11:16 AM   61

## 2020-09-17 ENCOUNTER — INPATIENT (INPATIENT)
Facility: HOSPITAL | Age: 55
LOS: 3 days | Discharge: HOME | End: 2020-09-21
Attending: GENERAL ACUTE CARE HOSPITAL | Admitting: GENERAL ACUTE CARE HOSPITAL
Payer: COMMERCIAL

## 2020-09-17 VITALS
WEIGHT: 190.04 LBS | HEART RATE: 85 BPM | RESPIRATION RATE: 17 BRPM | SYSTOLIC BLOOD PRESSURE: 138 MMHG | OXYGEN SATURATION: 97 % | HEIGHT: 66 IN | DIASTOLIC BLOOD PRESSURE: 82 MMHG | TEMPERATURE: 99 F

## 2020-09-17 DIAGNOSIS — I10 ESSENTIAL (PRIMARY) HYPERTENSION: ICD-10-CM

## 2020-09-17 DIAGNOSIS — Z90.710 ACQUIRED ABSENCE OF BOTH CERVIX AND UTERUS: Chronic | ICD-10-CM

## 2020-09-17 DIAGNOSIS — G45.9 TRANSIENT CEREBRAL ISCHEMIC ATTACK, UNSPECIFIED: ICD-10-CM

## 2020-09-17 DIAGNOSIS — D64.9 ANEMIA, UNSPECIFIED: ICD-10-CM

## 2020-09-17 LAB
ALBUMIN SERPL ELPH-MCNC: 3.8 G/DL — SIGNIFICANT CHANGE UP (ref 3.3–5)
ALP SERPL-CCNC: 57 U/L — SIGNIFICANT CHANGE UP (ref 40–120)
ALT FLD-CCNC: 25 U/L — SIGNIFICANT CHANGE UP (ref 12–78)
ANION GAP SERPL CALC-SCNC: 8 MMOL/L — SIGNIFICANT CHANGE UP (ref 5–17)
APTT BLD: 33.9 SEC — SIGNIFICANT CHANGE UP (ref 27.5–35.5)
AST SERPL-CCNC: 27 U/L — SIGNIFICANT CHANGE UP (ref 15–37)
BASOPHILS # BLD AUTO: 0.02 K/UL — SIGNIFICANT CHANGE UP (ref 0–0.2)
BASOPHILS NFR BLD AUTO: 0.3 % — SIGNIFICANT CHANGE UP (ref 0–2)
BILIRUB SERPL-MCNC: 0.3 MG/DL — SIGNIFICANT CHANGE UP (ref 0.2–1.2)
BUN SERPL-MCNC: 14 MG/DL — SIGNIFICANT CHANGE UP (ref 7–23)
CALCIUM SERPL-MCNC: 8.6 MG/DL — SIGNIFICANT CHANGE UP (ref 8.5–10.1)
CHLORIDE SERPL-SCNC: 104 MMOL/L — SIGNIFICANT CHANGE UP (ref 96–108)
CO2 SERPL-SCNC: 28 MMOL/L — SIGNIFICANT CHANGE UP (ref 22–31)
CREAT SERPL-MCNC: 0.84 MG/DL — SIGNIFICANT CHANGE UP (ref 0.5–1.3)
EOSINOPHIL # BLD AUTO: 0.07 K/UL — SIGNIFICANT CHANGE UP (ref 0–0.5)
EOSINOPHIL NFR BLD AUTO: 1.1 % — SIGNIFICANT CHANGE UP (ref 0–6)
GLUCOSE BLDC GLUCOMTR-MCNC: 184 MG/DL — HIGH (ref 70–99)
GLUCOSE SERPL-MCNC: 144 MG/DL — HIGH (ref 70–99)
HCT VFR BLD CALC: 38.7 % — SIGNIFICANT CHANGE UP (ref 34.5–45)
HGB BLD-MCNC: 11.9 G/DL — SIGNIFICANT CHANGE UP (ref 11.5–15.5)
IMM GRANULOCYTES NFR BLD AUTO: 0.3 % — SIGNIFICANT CHANGE UP (ref 0–1.5)
INR BLD: 1.12 RATIO — SIGNIFICANT CHANGE UP (ref 0.88–1.16)
LYMPHOCYTES # BLD AUTO: 2.71 K/UL — SIGNIFICANT CHANGE UP (ref 1–3.3)
LYMPHOCYTES # BLD AUTO: 41.2 % — SIGNIFICANT CHANGE UP (ref 13–44)
MCHC RBC-ENTMCNC: 22.4 PG — LOW (ref 27–34)
MCHC RBC-ENTMCNC: 30.7 GM/DL — LOW (ref 32–36)
MCV RBC AUTO: 72.9 FL — LOW (ref 80–100)
MONOCYTES # BLD AUTO: 0.46 K/UL — SIGNIFICANT CHANGE UP (ref 0–0.9)
MONOCYTES NFR BLD AUTO: 7 % — SIGNIFICANT CHANGE UP (ref 2–14)
NEUTROPHILS # BLD AUTO: 3.29 K/UL — SIGNIFICANT CHANGE UP (ref 1.8–7.4)
NEUTROPHILS NFR BLD AUTO: 50.1 % — SIGNIFICANT CHANGE UP (ref 43–77)
NRBC # BLD: 0 /100 WBCS — SIGNIFICANT CHANGE UP (ref 0–0)
PLATELET # BLD AUTO: 210 K/UL — SIGNIFICANT CHANGE UP (ref 150–400)
POTASSIUM SERPL-MCNC: 4.1 MMOL/L — SIGNIFICANT CHANGE UP (ref 3.5–5.3)
POTASSIUM SERPL-SCNC: 4.1 MMOL/L — SIGNIFICANT CHANGE UP (ref 3.5–5.3)
PROT SERPL-MCNC: 8.3 GM/DL — SIGNIFICANT CHANGE UP (ref 6–8.3)
PROTHROM AB SERPL-ACNC: 12.9 SEC — SIGNIFICANT CHANGE UP (ref 10.6–13.6)
RBC # BLD: 5.31 M/UL — HIGH (ref 3.8–5.2)
RBC # FLD: 14.3 % — SIGNIFICANT CHANGE UP (ref 10.3–14.5)
SARS-COV-2 RNA SPEC QL NAA+PROBE: SIGNIFICANT CHANGE UP
SODIUM SERPL-SCNC: 140 MMOL/L — SIGNIFICANT CHANGE UP (ref 135–145)
TROPONIN I SERPL-MCNC: <.015 NG/ML — SIGNIFICANT CHANGE UP (ref 0.01–0.04)
WBC # BLD: 6.57 K/UL — SIGNIFICANT CHANGE UP (ref 3.8–10.5)
WBC # FLD AUTO: 6.57 K/UL — SIGNIFICANT CHANGE UP (ref 3.8–10.5)

## 2020-09-17 PROCEDURE — 70450 CT HEAD/BRAIN W/O DYE: CPT | Mod: 26,59

## 2020-09-17 PROCEDURE — 70498 CT ANGIOGRAPHY NECK: CPT | Mod: 26

## 2020-09-17 PROCEDURE — 93306 TTE W/DOPPLER COMPLETE: CPT | Mod: 26

## 2020-09-17 PROCEDURE — 93010 ELECTROCARDIOGRAM REPORT: CPT

## 2020-09-17 PROCEDURE — 99223 1ST HOSP IP/OBS HIGH 75: CPT

## 2020-09-17 PROCEDURE — 70496 CT ANGIOGRAPHY HEAD: CPT | Mod: 26

## 2020-09-17 PROCEDURE — 99291 CRITICAL CARE FIRST HOUR: CPT

## 2020-09-17 PROCEDURE — 93880 EXTRACRANIAL BILAT STUDY: CPT | Mod: 26

## 2020-09-17 RX ORDER — SPIRONOLACTONE 25 MG/1
50 TABLET, FILM COATED ORAL DAILY
Refills: 0 | Status: DISCONTINUED | OUTPATIENT
Start: 2020-09-17 | End: 2020-09-21

## 2020-09-17 RX ORDER — FOLIC ACID 0.8 MG
1 TABLET ORAL DAILY
Refills: 0 | Status: DISCONTINUED | OUTPATIENT
Start: 2020-09-17 | End: 2020-09-21

## 2020-09-17 RX ORDER — ATENOLOL 25 MG/1
50 TABLET ORAL
Refills: 0 | Status: DISCONTINUED | OUTPATIENT
Start: 2020-09-17 | End: 2020-09-21

## 2020-09-17 RX ORDER — DIVALPROEX SODIUM 500 MG/1
1000 TABLET, DELAYED RELEASE ORAL AT BEDTIME
Refills: 0 | Status: DISCONTINUED | OUTPATIENT
Start: 2020-09-17 | End: 2020-09-21

## 2020-09-17 RX ORDER — CLOPIDOGREL BISULFATE 75 MG/1
75 TABLET, FILM COATED ORAL DAILY
Refills: 0 | Status: DISCONTINUED | OUTPATIENT
Start: 2020-09-17 | End: 2020-09-21

## 2020-09-17 RX ORDER — SERTRALINE 25 MG/1
50 TABLET, FILM COATED ORAL DAILY
Refills: 0 | Status: DISCONTINUED | OUTPATIENT
Start: 2020-09-17 | End: 2020-09-21

## 2020-09-17 RX ORDER — DIVALPROEX SODIUM 500 MG/1
500 TABLET, DELAYED RELEASE ORAL
Refills: 0 | Status: DISCONTINUED | OUTPATIENT
Start: 2020-09-17 | End: 2020-09-17

## 2020-09-17 RX ORDER — NIFEDIPINE 30 MG
30 TABLET, EXTENDED RELEASE 24 HR ORAL DAILY
Refills: 0 | Status: DISCONTINUED | OUTPATIENT
Start: 2020-09-17 | End: 2020-09-21

## 2020-09-17 RX ORDER — INFLUENZA VIRUS VACCINE 15; 15; 15; 15 UG/.5ML; UG/.5ML; UG/.5ML; UG/.5ML
0.5 SUSPENSION INTRAMUSCULAR ONCE
Refills: 0 | Status: COMPLETED | OUTPATIENT
Start: 2020-09-17 | End: 2020-09-17

## 2020-09-17 RX ORDER — FERROUS SULFATE 325(65) MG
325 TABLET ORAL DAILY
Refills: 0 | Status: DISCONTINUED | OUTPATIENT
Start: 2020-09-17 | End: 2020-09-21

## 2020-09-17 RX ADMIN — DIVALPROEX SODIUM 1000 MILLIGRAM(S): 500 TABLET, DELAYED RELEASE ORAL at 21:54

## 2020-09-17 NOTE — ED ADULT NURSE NOTE - OBJECTIVE STATEMENT
Pt was BIBA for stroke like symptoms that started at 2:15. Right sided weakness and numbness which has resolved. Denies any facial droop or slurred speech. Pt took baby aspirin at home.

## 2020-09-17 NOTE — ED ADULT NURSE NOTE - NSIMPLEMENTINTERV_GEN_ALL_ED
Implemented All Universal Safety Interventions:  [place] to call system. Call bell, personal items and telephone within reach. Instruct patient to call for assistance. Room bathroom lighting operational. Non-slip footwear when patient is off stretcher. Physically safe environment: no spills, clutter or unnecessary equipment. Stretcher in lowest position, wheels locked, appropriate side rails in place.

## 2020-09-17 NOTE — H&P ADULT - ASSESSMENT
79 year old male with PMH of Ileal conduit s/p cystectomy and prostatectomy 9 years ago for Prostate CA with met to Bladder, DM II, HTN, HLD, otherwise presenting to ED due to fever x 2 days, with fever 101 this AM - here  in ED pt afebrile. Pt otherwise has been doing well before this and has not had cough or URI symptoms. Denies abd pain, no diarrhea.

## 2020-09-17 NOTE — H&P ADULT - NSHPPHYSICALEXAM_GEN_ALL_CORE
ICU Vital Signs Last 24 Hrs  T(C): 36.9 (17 Sep 2020 17:07), Max: 37 (17 Sep 2020 15:05)  T(F): 98.4 (17 Sep 2020 17:07), Max: 98.6 (17 Sep 2020 15:05)  HR: 74 (17 Sep 2020 17:07) (74 - 85)  BP: 141/83 (17 Sep 2020 17:07) (138/82 - 141/83)  BP(mean): --  ABP: --  ABP(mean): --  RR: 18 (17 Sep 2020 17:07) (17 - 18)  SpO2: 99% (17 Sep 2020 17:07) (97% - 99%)  GENERAL: NAD well-developed  HEAD:  Atraumatic, Normocephalic  EYES: EOMI, PERRLA, conjunctiva and sclera clear  ENMT: No tonsillar erythema, exudates, or enlargement; Moist mucous membranes, Good dentition, No lesions  NECK: Supple, No JVD, Normal thyroid  NERVOUS SYSTEM:  Alert & Oriented X3, Good concentration; Motor Strength 5/5 B/L upper and lower extremities; DTRs 2+ intact and symmetric  CHEST/LUNG: Clear to percussion bilaterally; No rales, rhonchi, wheezing, or rubs  HEART: Regular rate and rhythm; No murmurs, rubs, or gallops  ABDOMEN: Soft, Nontender, Nondistended; Bowel sounds present  EXTREMITIES:  2+ Peripheral Pulses, No clubbing, cyanosis, or edema  LYMPH: No lymphadenopathy   SKIN: No rashes or lesions

## 2020-09-17 NOTE — ED ADULT TRIAGE NOTE - CHIEF COMPLAINT QUOTE
resolving left sided numbness and weakness,  current resolving left sided headache, took 2 baby aspirin at around 2 35 pm. H/O stroke, TIAs

## 2020-09-17 NOTE — H&P ADULT - HISTORY OF PRESENT ILLNESS
54 y/o F with pmhx anemia, depression, hx of TIA, HTN came to ED because of left arm leg and face numbness and weakness that has resolved. x1 month ago she noted left numbness that resolved. she saw her doctor started on aspirin and Plavix and had outpatient MRI which she does not know results on. Today she noted left arm leg and face numbness and weakness. Resolved after she took 2 baby aspirin arrived to ED w/no complaints.

## 2020-09-17 NOTE — ED PROVIDER NOTE - CLINICAL SUMMARY MEDICAL DECISION MAKING FREE TEXT BOX
Pt with stroke like symptoms all resolved so not TPA candidate, discuss with Dr Chacon who recommended. Who recommended admission for TIA.

## 2020-09-17 NOTE — ED PROVIDER NOTE - OBJECTIVE STATEMENT
56 y/o F with pmhx anemia, depression, hx of TIA, HTN presents to ED 54 y/o F with pmhx anemia, depression, hx of TIA, HTN came to ED because of left arm leg and face numbness and weakness that has resolved. x1 month ago she noted left numbness that resolved. she saw her doctor started on aspirin and Plavix and had outpatient MRI which she does not know results on. Today she noted left arm leg and face numbness and weakness. Resolved after she took 2 baby aspirin arrived to ED w/no complaints.

## 2020-09-17 NOTE — ED PROVIDER NOTE - PMH
Anemia    Angina pectoris    Depression    HTN (hypertension)    TIA (transient ischemic attack)

## 2020-09-17 NOTE — H&P ADULT - NSICDXPASTMEDICALHX_GEN_ALL_CORE_FT
PAST MEDICAL HISTORY:  Anemia     Angina pectoris     Depression     HTN (hypertension)     TIA (transient ischemic attack)

## 2020-09-18 LAB
A1C WITH ESTIMATED AVERAGE GLUCOSE RESULT: 6.7 % — HIGH (ref 4–5.6)
CHOLEST SERPL-MCNC: 135 MG/DL — SIGNIFICANT CHANGE UP (ref 10–199)
ESTIMATED AVERAGE GLUCOSE: 146 MG/DL — HIGH (ref 68–114)
GLUCOSE BLDC GLUCOMTR-MCNC: 166 MG/DL — HIGH (ref 70–99)
GLUCOSE BLDC GLUCOMTR-MCNC: 94 MG/DL — SIGNIFICANT CHANGE UP (ref 70–99)
HDLC SERPL-MCNC: 38 MG/DL — LOW
LIPID PNL WITH DIRECT LDL SERPL: 81 MG/DL — SIGNIFICANT CHANGE UP
SARS-COV-2 IGG SERPL QL IA: NEGATIVE — SIGNIFICANT CHANGE UP
SARS-COV-2 IGM SERPL IA-ACNC: 0.3 RATIO — SIGNIFICANT CHANGE UP
TOTAL CHOLESTEROL/HDL RATIO MEASUREMENT: 3.5 RATIO — SIGNIFICANT CHANGE UP (ref 3.3–7.1)
TRIGL SERPL-MCNC: 81 MG/DL — SIGNIFICANT CHANGE UP (ref 10–149)

## 2020-09-18 PROCEDURE — 71045 X-RAY EXAM CHEST 1 VIEW: CPT | Mod: 26

## 2020-09-18 PROCEDURE — 70551 MRI BRAIN STEM W/O DYE: CPT | Mod: 26

## 2020-09-18 PROCEDURE — 99233 SBSQ HOSP IP/OBS HIGH 50: CPT

## 2020-09-18 RX ORDER — ASPIRIN/CALCIUM CARB/MAGNESIUM 324 MG
81 TABLET ORAL DAILY
Refills: 0 | Status: DISCONTINUED | OUTPATIENT
Start: 2020-09-18 | End: 2020-09-21

## 2020-09-18 RX ORDER — SIMVASTATIN 20 MG/1
20 TABLET, FILM COATED ORAL AT BEDTIME
Refills: 0 | Status: DISCONTINUED | OUTPATIENT
Start: 2020-09-18 | End: 2020-09-21

## 2020-09-18 RX ORDER — RISPERIDONE 4 MG/1
0.5 TABLET ORAL AT BEDTIME
Refills: 0 | Status: DISCONTINUED | OUTPATIENT
Start: 2020-09-18 | End: 2020-09-21

## 2020-09-18 RX ORDER — HEPARIN SODIUM 5000 [USP'U]/ML
5000 INJECTION INTRAVENOUS; SUBCUTANEOUS EVERY 12 HOURS
Refills: 0 | Status: DISCONTINUED | OUTPATIENT
Start: 2020-09-18 | End: 2020-09-21

## 2020-09-18 RX ADMIN — ATENOLOL 50 MILLIGRAM(S): 25 TABLET ORAL at 17:00

## 2020-09-18 RX ADMIN — CLOPIDOGREL BISULFATE 75 MILLIGRAM(S): 75 TABLET, FILM COATED ORAL at 11:18

## 2020-09-18 RX ADMIN — DIVALPROEX SODIUM 1000 MILLIGRAM(S): 500 TABLET, DELAYED RELEASE ORAL at 21:17

## 2020-09-18 RX ADMIN — RISPERIDONE 0.5 MILLIGRAM(S): 4 TABLET ORAL at 21:17

## 2020-09-18 RX ADMIN — ATENOLOL 50 MILLIGRAM(S): 25 TABLET ORAL at 05:40

## 2020-09-18 RX ADMIN — SERTRALINE 50 MILLIGRAM(S): 25 TABLET, FILM COATED ORAL at 11:18

## 2020-09-18 RX ADMIN — SPIRONOLACTONE 50 MILLIGRAM(S): 25 TABLET, FILM COATED ORAL at 05:39

## 2020-09-18 RX ADMIN — Medication 325 MILLIGRAM(S): at 11:18

## 2020-09-18 RX ADMIN — Medication 1 TABLET(S): at 11:18

## 2020-09-18 RX ADMIN — Medication 30 MILLIGRAM(S): at 05:39

## 2020-09-18 RX ADMIN — Medication 1 MILLIGRAM(S): at 11:18

## 2020-09-18 NOTE — PHYSICAL THERAPY INITIAL EVALUATION ADULT - PERTINENT HX OF CURRENT PROBLEM, REHAB EVAL
TIA. On 9/17/20 patient noted left arm leg and face numbness and weakness. Resolved after she took 2 baby aspirin arrived to ED w/no complaints.

## 2020-09-18 NOTE — PROGRESS NOTE ADULT - SUBJECTIVE AND OBJECTIVE BOX
HPI:   54 y/o F with pmhx anemia, depression, hx of TIA, HTN came to ED because of left arm leg and face numbness and weakness that has resolved. x1 month ago she noted left numbness that resolved. she saw her doctor started on aspirin and Plavix and had outpatient MRI which she does not know results on. Today she noted left arm leg and face numbness and weakness. Resolved after she took 2 baby aspirin arrived to ED w/no complaints.   (17 Sep 2020 17:12)      SUBJECTIVE & OBJECTIVE: Pt seen and examined at bedside.   no overnight events.   states numbness and tingling resolved after she took 2 baby aspirins at home     Denies fever, chills, N/V, dizziness, HA, cough, CP, palpitations, SOB, abdominal pain, dysuria, diarrhea, constipation.     PHYSICAL EXAM:  T(C): 36.6 (09-18-20 @ 16:35), Max: 37 (09-18-20 @ 05:16)  HR: 65 (09-18-20 @ 16:35) (62 - 73)  BP: 137/79 (09-18-20 @ 16:35) (118/75 - 155/89)  RR: 18 (09-18-20 @ 16:35) (16 - 18)  SpO2: 98% (09-18-20 @ 16:35) (97% - 100%)  Wt(kg): --   I&O's Detail    GENERAL: NAD, well-groomed, well-developed   HEAD:  Atraumatic, Normocephalic  EYES: EOMI, PERRLA, conjunctiva and sclera clear  ENMT: Moist mucous membranes  NECK: Supple, No JVD  NERVOUS SYSTEM:  Alert & Oriented X3, no facial droop, speech clear, strength 5/5 throughout, finger-nose test normal   CHEST/LUNG: Clear to auscultation bilaterally; No rales, rhonchi, wheezing, or rubs  HEART: Regular rate and rhythm; No murmurs, rubs, or gallops  ABDOMEN: Soft, Nontender, Nondistended; Bowel sounds present  EXTREMITIES:  2+ Peripheral Pulses b/l, No clubbing, cyanosis, or edema b/l     MEDICATIONS  (STANDING):  aspirin enteric coated 81 milliGRAM(s) Oral daily  ATENolol  Tablet 50 milliGRAM(s) Oral two times a day  clopidogrel Tablet 75 milliGRAM(s) Oral daily  diVALproex ER 1000 milliGRAM(s) Oral at bedtime  ferrous    sulfate 325 milliGRAM(s) Oral daily  folic acid 1 milliGRAM(s) Oral daily  heparin   Injectable 5000 Unit(s) SubCutaneous every 12 hours  influenza   Vaccine 0.5 milliLiter(s) IntraMuscular once  multivitamin 1 Tablet(s) Oral daily  NIFEdipine XL 30 milliGRAM(s) Oral daily  risperiDONE   Tablet 0.5 milliGRAM(s) Oral at bedtime  sertraline 50 milliGRAM(s) Oral daily  spironolactone 50 milliGRAM(s) Oral daily    MEDICATIONS  (PRN):      LABS:                        11.9   6.57  )-----------( 210      ( 17 Sep 2020 15:28 )             38.7     09-17    140  |  104  |  14  ----------------------------<  144<H>  4.1   |  28  |  0.84    Ca    8.6      17 Sep 2020 15:28    TPro  8.3  /  Alb  3.8  /  TBili  0.3  /  DBili  x   /  AST  27  /  ALT  25  /  AlkPhos  57  09-17    PT/INR - ( 17 Sep 2020 15:28 )   PT: 12.9 sec;   INR: 1.12 ratio         PTT - ( 17 Sep 2020 15:28 )  PTT:33.9 sec      CAPILLARY BLOOD GLUCOSE      POCT Blood Glucose.: 166 mg/dL (18 Sep 2020 10:59)  POCT Blood Glucose.: 94 mg/dL (18 Sep 2020 07:46)      CARDIAC MARKERS ( 17 Sep 2020 15:28 )  <.015 ng/mL / x     / x     / x     / x        A1C with Estimated Average Glucose in AM (09.18.20 @ 11:12)    A1C with Estimated Average Glucose Result: 6.7: Method: Immunoassay       Reference Range                4.0-5.6%       High risk (prediabetic)        5.7-6.4%       Diabetic, diagnostic             >=6.5%       ADA diabetic treatment goal       <7.0%  The Hemoglobin A1c testing is NGSP-certified.Reference ranges are based  upon the 2010 recommendations of  the American Diabetes Association.  Interpretation may vary for children  and adolescents. %    Estimated Average Glucose: 146: The Estimated Average Glucose (eAG) or Mean Plasma Glucose (MPG) value is  calculated from the hemoglobin A1c value and covers the same time period.   The American Diabetes Association (ADA) and other professional  organizations recommend reporting the eAG with the HgbA1c. mg/dL    Lipid Profile in AM (09.18.20 @ 11:21)    Total Cholesterol/HDL Ratio Measurement: 3.5 RATIO    Cholesterol, Serum: 135 mg/dL    Triglycerides, Serum: 81 mg/dL    HDL Cholesterol, Serum: 38: HDL Levels >/= 60 mg/dL are considered beneficial and a "negative" risk  factor.  Effective 08/15/2018: New reference range and interpretive comment. mg/dL    Direct LDL: 81: LDL Cholesterol (mg/dL) --- Interpretive Comment (for adults 18 and over)  Optimal LDL Level may vary based on clinical situation  Below 70                   Ideal for people at very high risk of heart  disease  Below 100                  Ideal for people at risk of heart disease  100 - 129                    Near [place]  130 - 159                    Borderline high  160 - 189                    High  190 and Above           Very high mg/dL        RECENT CULTURES:      RADIOLOGY & ADDITIONAL TESTS:  < from: Xray Chest 1 View-PORTABLE IMMEDIATE (Xray Chest 1 View-PORTABLE IMMEDIATE .) (09.18.20 @ 15:17) >  INTERPRETATION:  PROCEDURE: AP view of the chest.    CLINICAL INFORMATION: Medical clearance for admission.    COMPARISON: 11/5/2019.    FINDINGS:    Cardiac loop recorder is noted.    Lungs: The lungs are clear.  Heart: The heart is normal in size.  Mediastinum: The mediastinum is within normal limits.    IMPRESSION:    Clear lungs.    < end of copied text >    < from: MR Head No Cont (09.18.20 @ 10:10) >  INTERPRETATION:  MRI OF THE BRAIN WITHOUT CONTRAST    CLINICAL INDICATION: Left-sided weakness.    TECHNIQUE: Multiplanar multisequence noncontrast MRI of the brain wasperformed.    COMPARISON: CT stroke series, 9/17/2020.    FINDINGS:    The ventricular and sulcal size and configuration is age appropriate. No significant signal abnormalities are seen in the brain parenchyma.    There is no acute infarction, intracranial hemorrhage, mass lesion, mass effect or herniation. There is no extra-axial fluid collection or hydrocephalus. There is an enlarged perivascular space in the left basal ganglia.    Flow-voids within the dominant cerebral arteries are preserved.    The visualized orbits are unremarkable.    The visualized paranasal sinuses and mastoid bones are adequately developed and aerated.      IMPRESSION:    There is no acute infarction, acute hemorrhage, cerebral edema, or intracranial mass effect.    < end of copied text >    < from: CT Angio Neck w/ IV Cont (09.17.20 @ 15:43) >  IMPRESSION:    CTA NECK: No vessel narrowing or occlusion in the neck.    CTA HEAD: No evidence of vascular stenosis, occlusion, aneurysm or vascular malformation.    < end of copied text >      < from: TTE Echo Complete w/o Contrast w/ Doppler (09.17.20 @ 19:24) >  Summary:   1. Left ventricular ejection fraction, by visual estimation, is 60 to 65%.   2. Technically good study.   3. Normal global left ventricular systolic function.   4. Normal left ventricular internal cavity size.   5. Normal left ventricular size and wall thicknesses, with normal systolic and diastolic function.   6. Normal right ventricular size and function.   7. Normal left atrial size.   8. Normal right atrial size.   9. There is no evidence of pericardial effusion.  10. Structurally normal mitral valve, with normal leaflet excursion.  11. Trace mitral valve regurgitation.  12. Mild tricuspid regurgitation.  13. Normal trileaflet aortic valve with normal opening.    < end of copied text >      Imaging Personally Reviewed:  [x ] YES  [ ] NO    Consultant(s) Notes Reviewed:  [x ] YES  [ ] NO    Care Discussed with Consultants/Other Providers [ x] YES  [ ] NO    Care discussed in detail with patient.  All questions and concerns addressed

## 2020-09-18 NOTE — CONSULT NOTE ADULT - NSREFPHYEXINPTDOCREFER_GEN_ALL_CORE
AOX3, able to repeat, name and fluent. CN2-12 intact. SUERO 5/5. Sensation to LT intact in all extremities. reflexes +3 throughout. Toes mute b/l

## 2020-09-18 NOTE — CONSULT NOTE ADULT - SUBJECTIVE AND OBJECTIVE BOX
56 y/o F with pmhx anemia, depression, hx of TIA, HTN came to ED because of left arm leg and face numbness and weakness that has resolved. x1 month ago she noted left numbness that resolved and 10 years ago she said she had a stroke involving her right side that resolved as well. she saw her doctor started on aspirin and Plavix and had outpatient MRI which she does not know results on. Today she noted left arm leg and face numbness and weakness and BP was high. Resolved after she took 2 baby aspirin arrived to ED w/no complaints.  She had a loop recorder placed 2 weeks prior.       VSS    A1C 6.7  CTA neg  MRI neg    A/P  Likely TIA  HTN  DM  Past CVA      Plan:  echo  continue ASA/Plavix for now  check lipid profile; start lipitor 20mg  DM control  will need cardiologist outpt followup to interrogate loop recorder she has placed  neuro stable, follow up outpt; counseled on diet, BP control and exercise    Thank you for this consult  Violet Sullivan DO  [phone]

## 2020-09-18 NOTE — PHYSICAL THERAPY INITIAL EVALUATION ADULT - ADDITIONAL COMMENTS
There are 4 steps c close gui rails at the entry of the house and one flight of steps c R rail up to negotiate at home.

## 2020-09-18 NOTE — PHYSICAL THERAPY INITIAL EVALUATION ADULT - GENERAL OBSERVATIONS, REHAB EVAL
Pt was seen in supine c cardiac monitor donned, alert and Ox4. Pt was comfortable and motivated.Coordination and sesation are good. MS is good throughout. Visual field intact.

## 2020-09-18 NOTE — PROGRESS NOTE ADULT - ASSESSMENT
54 y/o F with pmhx anemia, depression, hx of TIA, HTN came to ED because of left arm leg and face numbness and weakness that has resolved.    Cardiologist: Dr. Lane     Assessment and Plan:   #Presumed TIA, symptoms resolved    patient stated for many years she has been having similar episodes of sudden onset palpitations , chest tightness, hypertension followed by unilateral numbness and tingling which resolves on it's own within minutes. Patient has had multiple imaging studies of the brain which were neg. She also has had stress tests, echo (including esophageal echo) , cardiac cath by her outpt cardiologist Dr. Lane but all studies have been negative including a loop recorder which was placed about 1 year ago but patient states that no sig events were found/recorded. patient denies stress or anxiety.   A1c 6.7%, LDL 81  ECHO pEF   Neurology consult appreciated   continue with ASA/Plavix   start Lipitor 20mg daily per neurology recommendations   but will rule out pheochromocytoma , endocrinology consult   follow  plasma metanephrines       #Essential HTN   continue with home meds     #Depression   no e/o SI/HI   continue with home meds     #Preventative measures   -heparin SQ-dvt ppx  general precautions

## 2020-09-19 ENCOUNTER — TRANSCRIPTION ENCOUNTER (OUTPATIENT)
Age: 55
End: 2020-09-19

## 2020-09-19 DIAGNOSIS — E27.5 ADRENOMEDULLARY HYPERFUNCTION: ICD-10-CM

## 2020-09-19 LAB
GLUCOSE BLDC GLUCOMTR-MCNC: 113 MG/DL — HIGH (ref 70–99)
GLUCOSE BLDC GLUCOMTR-MCNC: 154 MG/DL — HIGH (ref 70–99)
HCT VFR BLD CALC: 37.8 % — SIGNIFICANT CHANGE UP (ref 34.5–45)
HGB BLD-MCNC: 11.5 G/DL — SIGNIFICANT CHANGE UP (ref 11.5–15.5)
MCHC RBC-ENTMCNC: 22.4 PG — LOW (ref 27–34)
MCHC RBC-ENTMCNC: 30.4 GM/DL — LOW (ref 32–36)
MCV RBC AUTO: 73.7 FL — LOW (ref 80–100)
NRBC # BLD: 0 /100 WBCS — SIGNIFICANT CHANGE UP (ref 0–0)
PLATELET # BLD AUTO: 188 K/UL — SIGNIFICANT CHANGE UP (ref 150–400)
RBC # BLD: 5.13 M/UL — SIGNIFICANT CHANGE UP (ref 3.8–5.2)
RBC # FLD: 14.3 % — SIGNIFICANT CHANGE UP (ref 10.3–14.5)
WBC # BLD: 5.26 K/UL — SIGNIFICANT CHANGE UP (ref 3.8–10.5)
WBC # FLD AUTO: 5.26 K/UL — SIGNIFICANT CHANGE UP (ref 3.8–10.5)

## 2020-09-19 PROCEDURE — 99233 SBSQ HOSP IP/OBS HIGH 50: CPT

## 2020-09-19 RX ORDER — ACETAMINOPHEN 500 MG
650 TABLET ORAL EVERY 6 HOURS
Refills: 0 | Status: DISCONTINUED | OUTPATIENT
Start: 2020-09-19 | End: 2020-09-21

## 2020-09-19 RX ORDER — ACETAMINOPHEN 500 MG
2 TABLET ORAL
Qty: 0 | Refills: 0 | DISCHARGE
Start: 2020-09-19

## 2020-09-19 RX ORDER — AMOXICILLIN 250 MG/5ML
1 SUSPENSION, RECONSTITUTED, ORAL (ML) ORAL
Qty: 0 | Refills: 0 | DISCHARGE

## 2020-09-19 RX ORDER — SIMVASTATIN 20 MG/1
1 TABLET, FILM COATED ORAL
Qty: 30 | Refills: 0
Start: 2020-09-19 | End: 2020-10-18

## 2020-09-19 RX ORDER — FERROUS SULFATE 325(65) MG
1 TABLET ORAL
Qty: 30 | Refills: 0
Start: 2020-09-19 | End: 2020-10-18

## 2020-09-19 RX ORDER — ASPIRIN/CALCIUM CARB/MAGNESIUM 324 MG
1 TABLET ORAL
Qty: 30 | Refills: 0
Start: 2020-09-19 | End: 2020-10-18

## 2020-09-19 RX ADMIN — SIMVASTATIN 20 MILLIGRAM(S): 20 TABLET, FILM COATED ORAL at 21:15

## 2020-09-19 RX ADMIN — Medication 30 MILLIGRAM(S): at 05:35

## 2020-09-19 RX ADMIN — Medication 1 MILLIGRAM(S): at 12:27

## 2020-09-19 RX ADMIN — DIVALPROEX SODIUM 1000 MILLIGRAM(S): 500 TABLET, DELAYED RELEASE ORAL at 21:15

## 2020-09-19 RX ADMIN — SERTRALINE 50 MILLIGRAM(S): 25 TABLET, FILM COATED ORAL at 12:27

## 2020-09-19 RX ADMIN — CLOPIDOGREL BISULFATE 75 MILLIGRAM(S): 75 TABLET, FILM COATED ORAL at 12:27

## 2020-09-19 RX ADMIN — ATENOLOL 50 MILLIGRAM(S): 25 TABLET ORAL at 17:11

## 2020-09-19 RX ADMIN — SPIRONOLACTONE 50 MILLIGRAM(S): 25 TABLET, FILM COATED ORAL at 05:35

## 2020-09-19 RX ADMIN — Medication 325 MILLIGRAM(S): at 12:26

## 2020-09-19 RX ADMIN — ATENOLOL 50 MILLIGRAM(S): 25 TABLET ORAL at 05:35

## 2020-09-19 RX ADMIN — Medication 650 MILLIGRAM(S): at 09:58

## 2020-09-19 RX ADMIN — SIMVASTATIN 20 MILLIGRAM(S): 20 TABLET, FILM COATED ORAL at 05:38

## 2020-09-19 RX ADMIN — HEPARIN SODIUM 5000 UNIT(S): 5000 INJECTION INTRAVENOUS; SUBCUTANEOUS at 05:36

## 2020-09-19 RX ADMIN — Medication 650 MILLIGRAM(S): at 23:15

## 2020-09-19 RX ADMIN — RISPERIDONE 0.5 MILLIGRAM(S): 4 TABLET ORAL at 21:15

## 2020-09-19 RX ADMIN — HEPARIN SODIUM 5000 UNIT(S): 5000 INJECTION INTRAVENOUS; SUBCUTANEOUS at 17:11

## 2020-09-19 RX ADMIN — Medication 1 TABLET(S): at 12:28

## 2020-09-19 RX ADMIN — Medication 650 MILLIGRAM(S): at 22:44

## 2020-09-19 RX ADMIN — Medication 81 MILLIGRAM(S): at 12:26

## 2020-09-19 NOTE — DISCHARGE NOTE PROVIDER - HOSPITAL COURSE
54 y/o F with pmhx anemia, depression, hx of TIA, HTN came to ED because of left arm leg and face numbness and weakness that has resolved.    Cardiologist: Dr. Lane     Assessment and Plan:   #Presumed TIA, symptoms resolved    patient stated for many years she has been having similar episodes of sudden onset palpitations , chest tightness, hypertension followed by unilateral numbness and tingling which resolves on it's own within minutes. Patient has had multiple imaging studies of the brain which were neg. She also has had stress tests, echo (including esophageal echo) , cardiac cath by her outpt cardiologist Dr. Lane but all studies have been negative including a loop recorder which was placed about 1 year ago but patient states that no sig events were found/recorded. patient denies stress or anxiety.   A1c 6.7%, LDL 81  ECHO pEF   Neurology consult appreciated   continue with ASA/Plavix   started simvastatin   but will rule out pheochromocytoma ,   will need endocrinology follow-up   follow  plasma metanephrines     #Essential HTN   continue with home meds     #Depression   no e/o SI/HI   continue with home meds     Discharge time : 40 min     RETURN PARAMETERS DISCUSSED WITH PATIENT, PATIENT EXPRESSED UNDERSTANDING AND IS AGREEABLE. DISCUSSED WITH PATIENT ON REFRAINING FROM DRIVING UNTIL FOLLOW-UP/ CLEARED BY PMD. PATIENT EXPRESSED UNDERSTANDING.       54 y/o F with pmhx anemia, depression, hx of TIA, HTN came to ED because of left arm leg and face numbness and weakness that has resolved.    Cardiologist: Dr. Lane     Assessment and Plan:   #Presumed TIA, symptoms resolved    patient stated for many years she has been having similar episodes of sudden onset palpitations , chest tightness, hypertension followed by unilateral numbness and tingling which resolves on it's own within minutes. Patient has had multiple imaging studies of the brain which were neg. She also has had stress tests, echo (including esophageal echo) , cardiac cath by her outpt cardiologist Dr. Lane but all studies have been negative including a loop recorder which was placed about 1 year ago but patient states that no sig events were found/recorded. patient denies stress or anxiety.   A1c 6.7%, LDL 81  ECHO pEF   Neurology consult appreciated   continue with ASA/Plavix   started simvastatin   but will rule out pheochromocytoma   will need endocrinology follow-up   follow  plasma metanephrines     #Essential HTN   continue with home meds     #Depression   no e/o SI/HI   continue with home meds     #Diabetes 2   A1c 6.7%  continue with metformin 500mg bid           Discharge time : 40 min     RETURN PARAMETERS DISCUSSED WITH PATIENT, PATIENT EXPRESSED UNDERSTANDING AND IS AGREEABLE. DISCUSSED WITH PATIENT ON REFRAINING FROM DRIVING UNTIL FOLLOW-UP/ CLEARED BY PMD. PATIENT EXPRESSED UNDERSTANDING.       54 y/o F with pmhx anemia, depression, hx of TIA, HTN, loop recorder came to ED because of left arm leg and face numbness and weakness that has resolved.    Cortisol AM, Serum (09.20.20 @ 12:14)    Cortisol AM, Serum: 7.4 ug/dL        Assessment and Plan:   #Presumed TIA, symptoms resolved    patient stated for many years she has been having similar episodes of sudden onset palpitations , chest tightness, hypertension followed by unilateral numbness and tingling which resolves on it's own within minutes. Patient has had multiple imaging studies of the brain which were neg. She also has had stress tests, echo (including esophageal echo) , cardiac cath by her outpt cardiologist Dr. Lane but all studies have been negative including a loop recorder which was placed about 1 year ago but patient states that no sig events were found/recorded. patient denies stress or anxiety.   A1c 6.7%, LDL 81  ECHO pEF   has loop recorder , follows with Cardiologist: Dr. Lane   Neurology following   continue with ASA/Plavix/simvastatin     ruling out  Medulloadrenal hyperfunction  ruling out  heightened catecholamine secretion 2/2 neuroendocrine tumor   needs work-up   endocrinology consult appreciated   follow 24 urine catecholamines sent   Cortisol levels wnl   follow Chromogranin A ,although index of suspicion for Carcinoid is very low   follow Check plasma aldosterone/renin ,  plasma metanephrine   will need to follow-up outpt for results, patient aware and agreeable    #Essential HTN   continue with home meds     #Depression   no e/o SI/HI   continue with home meds           Discharge time : 40 min     RETURN PARAMETERS DISCUSSED WITH PATIENT, PATIENT EXPRESSED UNDERSTANDING AND IS AGREEABLE. DISCUSSED WITH PATIENT ON REFRAINING FROM DRIVING UNTIL FOLLOW-UP/ CLEARED BY PMD. PATIENT EXPRESSED UNDERSTANDING.

## 2020-09-19 NOTE — PROGRESS NOTE ADULT - SUBJECTIVE AND OBJECTIVE BOX
· Subjective and Objective:    54 y/o F with pmhx anemia, depression, hx of TIA, HTN came to ED because of left arm leg and face numbness and weakness that has resolved. x1 month ago she noted left numbness that resolved and 10 years ago she said she had a stroke involving her right side that resolved as well. she saw her doctor started on aspirin and Plavix and had outpatient MRI which she does not know results on. Today she noted left arm leg and face numbness and weakness and BP was high. Resolved after she took 2 baby aspirin arrived to ED w/no complaints.  She had a loop recorder placed 2 weeks prior.       VSS  Physical Exam:    Reference Recent Physical Exam:  · In accordance with current standards limiting patient contact please refer to the recent:	Inpatient Physical Exam  · Inpatient Physical Exam Document Referenced	AOX3, able to repeat, name and fluent. CN2-12 intact. SUERO 5/5. Sensation to LT intact in all extremities. reflexes +3 throughout. Toes mute b/l    LDL 81  A1C 6.7  echo 60-65% no LA dilatation  CTA neg  MRI neg    A/P  Likely TIA  HTN  DM  Past CVA      Plan:  continue ASA/Plavix for now  start lipitor 20mg  DM control  will need cardiologist outpt followup to interrogate loop recorder she has placed  neuro stable, follow up outpt; counseled on diet, BP control and exercise    Thank you for this consult  Violet Sullivan DO  [phone]

## 2020-09-19 NOTE — DISCHARGE NOTE PROVIDER - NSDCCPCAREPLAN_GEN_ALL_CORE_FT
PRINCIPAL DISCHARGE DIAGNOSIS  Diagnosis: TIA (transient ischemic attack)  Assessment and Plan of Treatment:       SECONDARY DISCHARGE DIAGNOSES  Diagnosis: Diabetes  Assessment and Plan of Treatment:     Diagnosis: Essential hypertension  Assessment and Plan of Treatment: Essential hypertension     PRINCIPAL DISCHARGE DIAGNOSIS  Diagnosis: TIA (transient ischemic attack)  Assessment and Plan of Treatment: follow-up with endocrinology and cardiology within 1 week      SECONDARY DISCHARGE DIAGNOSES  Diagnosis: Diabetes  Assessment and Plan of Treatment:     Diagnosis: Essential hypertension  Assessment and Plan of Treatment: Essential hypertension

## 2020-09-19 NOTE — PROGRESS NOTE ADULT - ASSESSMENT
56 y/o F with pmhx anemia, depression, hx of TIA, HTN came to ED because of left arm leg and face numbness and weakness that has resolved.    Cardiologist: Dr. Lane     Assessment and Plan:   #Presumed TIA, symptoms resolved    patient stated for many years she has been having similar episodes of sudden onset palpitations , chest tightness, hypertension followed by unilateral numbness and tingling which resolves on it's own within minutes. Patient has had multiple imaging studies of the brain which were neg. She also has had stress tests, echo (including esophageal echo) , cardiac cath by her outpt cardiologist Dr. Lane but all studies have been negative including a loop recorder which was placed about 1 year ago but patient states that no sig events were found/recorded. patient denies stress or anxiety.   A1c 6.7%, LDL 81  ECHO pEF   Neurology consult appreciated   continue with ASA/Plavix   start Lipitor 20mg daily per neurology recommendations     ruling out  Medulloadrenal hyperfunction  ruling out  heightened catecholamine secretion 2/2 neuroendocrine tumor   needs work-up   endocrinology consult appreciated   Check 24 urine catecholamines and plasma metanephrine  also Check aldosterone , and Cortisol levels ,   Check Chromogranin A ,although index of suspicion for Carcinoid is very low         #Essential HTN   continue with home meds     #Depression   no e/o SI/HI   continue with home meds     #Preventative measures   -heparin SQ-dvt ppx  general precautions

## 2020-09-19 NOTE — DISCHARGE NOTE PROVIDER - PROVIDER TOKENS
FREE:[LAST:[cardiology],PHONE:[(   )    -],FAX:[(   )    -],ADDRESS:[Dr. Lane   [address], [place], [address] [phone]],FOLLOWUP:[1 week]],FREE:[LAST:[your primary care doctor],PHONE:[(   )    -],FAX:[(   )    -],FOLLOWUP:[1 week]] FREE:[LAST:[cardiology],PHONE:[(   )    -],FAX:[(   )    -],ADDRESS:[Dr. Lane   [address], [place], [place], [address] [phone]],FOLLOWUP:[1 week]],FREE:[LAST:[your primary care doctor],PHONE:[(   )    -],FAX:[(   )    -],FOLLOWUP:[1 week]],PROVIDER:[TOKEN:[[id number]]]

## 2020-09-19 NOTE — CONSULT NOTE ADULT - PROBLEM SELECTOR RECOMMENDATION 9
does not fit into Pheochromocytoma  still heightened catecholamine secretion from any NET needs to be ruled out   Check 24 urine catecholamines and plasma metanephrine  also Check aldosterone , and Cortisol levels ,   Check Chromogranin A ,although index of suspicion for Carcinoid is very low   Thank You for the courtesy of this consultation !!!

## 2020-09-19 NOTE — DISCHARGE NOTE PROVIDER - CARE PROVIDER_API CALL
cardiology,   Dr. Lane   [address]  [address] [facility], [place], [address] [phone]  Phone: (   )    -  Fax: (   )    -  Follow Up Time: 1 week    your primary care doctor,   Phone: (   )    -  Fax: (   )    -  Follow Up Time: 1 week   cardiology,   Dr. Lane   [address]  [address] [facility], [place], [place], [address] [phone]  Phone: (   )    -  Fax: (   )    -  Follow Up Time: 1 week    your primary care doctor,   Phone: (   )    -  Fax: (   )    -  Follow Up Time: 1 week    Janett Akers  ENDOCRINOLOGY/METAB/DIABETES  [address], [address]  [place] [address]  Phone: [phone]  Fax: [phone]  Follow Up Time:

## 2020-09-19 NOTE — CONSULT NOTE ADULT - SUBJECTIVE AND OBJECTIVE BOX
Patient is a 55y old  Female who presents with a chief complaint of tia (19 Sep 2020 15:14)      Reason For Consult: rule out Pheochromocytoma    HPI:   54 y/o F with pmhx anemia, depression, hx of TIA, HTN came to ED because of left arm leg and face numbness and weakness that has resolved. x1 month ago she noted left numbness that resolved. she saw her doctor started on aspirin and Plavix and had outpatient MRI which she does not know results on. Today she noted left arm leg and face numbness and weakness. Resolved after she took 2 baby aspirin arrived to ED w/no complaints.   (17 Sep 2020 17:12)  patient in the past have extensively worked up for Hypertension . CT scan/ MRI  of Adrenals been negative   patient has occasional sudden palpitations with sometimes associated headache . currently comes with TIA . Also during episodes has hemiparesis left sided     PAST MEDICAL & SURGICAL HISTORY:  Angina pectoris    TIA (transient ischemic attack)    Anemia    Depression    HTN (hypertension)    H/O total hysterectomy        FAMILY HISTORY:  No pertinent family history in first degree relatives          Social History:    MEDICATIONS  (STANDING):  aspirin enteric coated 81 milliGRAM(s) Oral daily  ATENolol  Tablet 50 milliGRAM(s) Oral two times a day  clopidogrel Tablet 75 milliGRAM(s) Oral daily  diVALproex ER 1000 milliGRAM(s) Oral at bedtime  ferrous    sulfate 325 milliGRAM(s) Oral daily  folic acid 1 milliGRAM(s) Oral daily  heparin   Injectable 5000 Unit(s) SubCutaneous every 12 hours  influenza   Vaccine 0.5 milliLiter(s) IntraMuscular once  multivitamin 1 Tablet(s) Oral daily  NIFEdipine XL 30 milliGRAM(s) Oral daily  risperiDONE   Tablet 0.5 milliGRAM(s) Oral at bedtime  sertraline 50 milliGRAM(s) Oral daily  simvastatin 20 milliGRAM(s) Oral at bedtime  spironolactone 50 milliGRAM(s) Oral daily    MEDICATIONS  (PRN):  acetaminophen   Tablet .. 650 milliGRAM(s) Oral every 6 hours PRN Moderate Pain (4 - 6)      REVIEW OF SYSTEMS:  CONSTITUTIONAL:  as per HPI  HEENT:  Eyes:  No diplopia or blurred vision. ENT:  No earache, sore throat or runny nose.  CARDIOVASCULAR:  No pressure, squeezing, strangling, tightness, heaviness or aching about the chest, neck, axilla or epigastrium.  RESPIRATORY:  No cough, shortness of breath, PND or orthopnea.  GASTROINTESTINAL:  No nausea, vomiting or diarrhea.  GENITOURINARY:  No dysuria, frequency or urgency. No Blood in urine  MUSCULOSKELETAL:  no joint aches, no muscle pain, myalgia  SKIN:  No change in skin, hair or nails.  NEUROLOGIC:  No paresthesias, fasciculations, seizures or weakness.  PSYCHIATRIC:  No disorder of thought or mood.  ENDOCRINE:  No heat or cold intolerance, polyuria or polydipsia. abnormal weight gain or loss, oral thrush  HEMATOLOGICAL:  No easy bruising or bleeding.     T(C): 36.6 (09-19-20 @ 16:30), Max: 37 (09-19-20 @ 11:13)  HR: 72 (09-19-20 @ 16:30) (59 - 72)  BP: 135/82 (09-19-20 @ 16:30) (127/66 - 142/82)  RR: 18 (09-19-20 @ 16:30) (16 - 18)  SpO2: 98% (09-19-20 @ 16:30) (98% - 100%)  Wt(kg): --    PHYSICAL EXAM:  GENERAL: NAD, well-groomed, well-developed  HEAD:  Atraumatic, Normocephalic  EYES: EOMI, PERRLA, conjunctiva and sclera clear  ENMT: No tonsillar erythema, exudates, or enlargement; Moist mucous membranes, Good dentition, No lesions  NECK: Supple, No JVD, Normal thyroid  NERVOUS SYSTEM:  Alert & Oriented X3, Good concentration; Motor Strength 5/5 B/L upper and lower extremities; DTRs 2+ intact and symmetric  CHEST/LUNG: Clear to percussion bilaterally; No rales, rhonchi, wheezing, or rubs  HEART: Regular rate and rhythm; No murmurs, rubs, or gallops  ABDOMEN: Soft, Nontender, Nondistended; Bowel sounds present  EXTREMITIES:  2+ Peripheral Pulses, No clubbing, cyanosis, or edema  LYMPH: No lymphadenopathy noted  SKIN: No rashes or lesions    CAPILLARY BLOOD GLUCOSE      POCT Blood Glucose.: 154 mg/dL (19 Sep 2020 11:07)  POCT Blood Glucose.: 113 mg/dL (19 Sep 2020 07:48)                            11.5   5.26  )-----------( 188      ( 19 Sep 2020 07:10 )             37.8       CMP:      Thyroid Function Tests:      Diabetes Tests:     Parathyroids:     Adrenals:       Radiology:

## 2020-09-19 NOTE — PROGRESS NOTE ADULT - SUBJECTIVE AND OBJECTIVE BOX
HPI:   54 y/o F with pmhx anemia, depression, hx of TIA, HTN came to ED because of left arm leg and face numbness and weakness that has resolved. x1 month ago she noted left numbness that resolved. she saw her doctor started on aspirin and Plavix and had outpatient MRI which she does not know results on. Today she noted left arm leg and face numbness and weakness. Resolved after she took 2 baby aspirin arrived to ED w/no complaints.   (17 Sep 2020 17:12)      SUBJECTIVE & OBJECTIVE: Pt seen and examined at bedside.   no overnight events.   feels well.     Denies fever, chills, N/V, dizziness, HA, cough, CP, palpitations, SOB, abdominal pain, dysuria, diarrhea, constipation.     PHYSICAL EXAM:  Vital Signs Last 24 Hrs  T(C): 36.6 (19 Sep 2020 16:30), Max: 37 (19 Sep 2020 11:13)  T(F): 97.9 (19 Sep 2020 16:30), Max: 98.6 (19 Sep 2020 11:13)  HR: 72 (19 Sep 2020 16:30) (59 - 72)  BP: 135/82 (19 Sep 2020 16:30) (127/66 - 142/82)  BP(mean): --  RR: 18 (19 Sep 2020 16:30) (16 - 18)  SpO2: 98% (19 Sep 2020 16:30) (98% - 100%)    GENERAL: NAD, well-groomed, well-developed   HEAD:  Atraumatic, Normocephalic  EYES: EOMI, PERRLA, conjunctiva and sclera clear  ENMT: Moist mucous membranes  NECK: Supple, No JVD  NERVOUS SYSTEM:  Alert & Oriented X3, no facial droop, speech clear, strength 5/5 throughout, finger-nose test normal   CHEST/LUNG: Clear to auscultation bilaterally; No rales, rhonchi, wheezing, or rubs  HEART: Regular rate and rhythm; No murmurs, rubs, or gallops  ABDOMEN: Soft, Nontender, Nondistended; Bowel sounds present  EXTREMITIES:  2+ Peripheral Pulses b/l, No clubbing, cyanosis, or edema b/l     MEDICATIONS  (STANDING):  aspirin enteric coated 81 milliGRAM(s) Oral daily  ATENolol  Tablet 50 milliGRAM(s) Oral two times a day  clopidogrel Tablet 75 milliGRAM(s) Oral daily  diVALproex ER 1000 milliGRAM(s) Oral at bedtime  ferrous    sulfate 325 milliGRAM(s) Oral daily  folic acid 1 milliGRAM(s) Oral daily  heparin   Injectable 5000 Unit(s) SubCutaneous every 12 hours  influenza   Vaccine 0.5 milliLiter(s) IntraMuscular once  multivitamin 1 Tablet(s) Oral daily  NIFEdipine XL 30 milliGRAM(s) Oral daily  risperiDONE   Tablet 0.5 milliGRAM(s) Oral at bedtime  sertraline 50 milliGRAM(s) Oral daily  spironolactone 50 milliGRAM(s) Oral daily    MEDICATIONS  (PRN):      LABS:             no new labs       CARDIAC MARKERS ( 17 Sep 2020 15:28 )  <.015 ng/mL / x     / x     / x     / x        A1C with Estimated Average Glucose in AM (09.18.20 @ 11:12)    A1C with Estimated Average Glucose Result: 6.7: Method: Immunoassay       Reference Range                4.0-5.6%       High risk (prediabetic)        5.7-6.4%       Diabetic, diagnostic             >=6.5%       ADA diabetic treatment goal       <7.0%  The Hemoglobin A1c testing is NGSP-certified.Reference ranges are based  upon the 2010 recommendations of  the American Diabetes Association.  Interpretation may vary for children  and adolescents. %    Estimated Average Glucose: 146: The Estimated Average Glucose (eAG) or Mean Plasma Glucose (MPG) value is  calculated from the hemoglobin A1c value and covers the same time period.   The American Diabetes Association (ADA) and other professional  organizations recommend reporting the eAG with the HgbA1c. mg/dL    Lipid Profile in AM (09.18.20 @ 11:21)    Total Cholesterol/HDL Ratio Measurement: 3.5 RATIO    Cholesterol, Serum: 135 mg/dL    Triglycerides, Serum: 81 mg/dL    HDL Cholesterol, Serum: 38: HDL Levels >/= 60 mg/dL are considered beneficial and a "negative" risk  factor.  Effective 08/15/2018: New reference range and interpretive comment. mg/dL    Direct LDL: 81: LDL Cholesterol (mg/dL) --- Interpretive Comment (for adults 18 and over)  Optimal LDL Level may vary based on clinical situation  Below 70                   Ideal for people at very high risk of heart  disease  Below 100                  Ideal for people at risk of heart disease  100 - 129                    [place]  130 - 159                    Borderline high  160 - 189                    High  190 and Above           Very high mg/dL        RECENT CULTURES:      RADIOLOGY & ADDITIONAL TESTS:  < from: Xray Chest 1 View-PORTABLE IMMEDIATE (Xray Chest 1 View-PORTABLE IMMEDIATE .) (09.18.20 @ 15:17) >  INTERPRETATION:  PROCEDURE: AP view of the chest.    CLINICAL INFORMATION: Medical clearance for admission.    COMPARISON: 11/5/2019.    FINDINGS:    Cardiac loop recorder is noted.    Lungs: The lungs are clear.  Heart: The heart is normal in size.  Mediastinum: The mediastinum is within normal limits.    IMPRESSION:    Clear lungs.    < end of copied text >    < from: MR Head No Cont (09.18.20 @ 10:10) >  INTERPRETATION:  MRI OF THE BRAIN WITHOUT CONTRAST    CLINICAL INDICATION: Left-sided weakness.    TECHNIQUE: Multiplanar multisequence noncontrast MRI of the brain wasperformed.    COMPARISON: CT stroke series, 9/17/2020.    FINDINGS:    The ventricular and sulcal size and configuration is age appropriate. No significant signal abnormalities are seen in the brain parenchyma.    There is no acute infarction, intracranial hemorrhage, mass lesion, mass effect or herniation. There is no extra-axial fluid collection or hydrocephalus. There is an enlarged perivascular space in the left basal ganglia.    Flow-voids within the dominant cerebral arteries are preserved.    The visualized orbits are unremarkable.    The visualized paranasal sinuses and mastoid bones are adequately developed and aerated.      IMPRESSION:    There is no acute infarction, acute hemorrhage, cerebral edema, or intracranial mass effect.    < end of copied text >    < from: CT Angio Neck w/ IV Cont (09.17.20 @ 15:43) >  IMPRESSION:    CTA NECK: No vessel narrowing or occlusion in the neck.    CTA HEAD: No evidence of vascular stenosis, occlusion, aneurysm or vascular malformation.    < end of copied text >      < from: TTE Echo Complete w/o Contrast w/ Doppler (09.17.20 @ 19:24) >  Summary:   1. Left ventricular ejection fraction, by visual estimation, is 60 to 65%.   2. Technically good study.   3. Normal global left ventricular systolic function.   4. Normal left ventricular internal cavity size.   5. Normal left ventricular size and wall thicknesses, with normal systolic and diastolic function.   6. Normal right ventricular size and function.   7. Normal left atrial size.   8. Normal right atrial size.   9. There is no evidence of pericardial effusion.  10. Structurally normal mitral valve, with normal leaflet excursion.  11. Trace mitral valve regurgitation.  12. Mild tricuspid regurgitation.  13. Normal trileaflet aortic valve with normal opening.    < end of copied text >      Imaging Personally Reviewed:  [x ] YES  [ ] NO    Consultant(s) Notes Reviewed:  [x ] YES  [ ] NO    Care Discussed with Consultants/Other Providers [ x] YES  [ ] NO    Care discussed in detail with patient.  All questions and concerns addressed

## 2020-09-20 LAB — CORTIS AM PEAK SERPL-MCNC: 7.4 UG/DL — SIGNIFICANT CHANGE UP (ref 6–18.4)

## 2020-09-20 PROCEDURE — 99232 SBSQ HOSP IP/OBS MODERATE 35: CPT

## 2020-09-20 RX ADMIN — Medication 1 MILLIGRAM(S): at 11:33

## 2020-09-20 RX ADMIN — Medication 1 TABLET(S): at 11:33

## 2020-09-20 RX ADMIN — ATENOLOL 50 MILLIGRAM(S): 25 TABLET ORAL at 17:25

## 2020-09-20 RX ADMIN — ATENOLOL 50 MILLIGRAM(S): 25 TABLET ORAL at 05:47

## 2020-09-20 RX ADMIN — CLOPIDOGREL BISULFATE 75 MILLIGRAM(S): 75 TABLET, FILM COATED ORAL at 11:33

## 2020-09-20 RX ADMIN — SIMVASTATIN 20 MILLIGRAM(S): 20 TABLET, FILM COATED ORAL at 21:40

## 2020-09-20 RX ADMIN — RISPERIDONE 0.5 MILLIGRAM(S): 4 TABLET ORAL at 21:40

## 2020-09-20 RX ADMIN — HEPARIN SODIUM 5000 UNIT(S): 5000 INJECTION INTRAVENOUS; SUBCUTANEOUS at 17:24

## 2020-09-20 RX ADMIN — HEPARIN SODIUM 5000 UNIT(S): 5000 INJECTION INTRAVENOUS; SUBCUTANEOUS at 05:47

## 2020-09-20 RX ADMIN — DIVALPROEX SODIUM 1000 MILLIGRAM(S): 500 TABLET, DELAYED RELEASE ORAL at 21:40

## 2020-09-20 RX ADMIN — Medication 81 MILLIGRAM(S): at 11:33

## 2020-09-20 RX ADMIN — Medication 30 MILLIGRAM(S): at 05:47

## 2020-09-20 RX ADMIN — SPIRONOLACTONE 50 MILLIGRAM(S): 25 TABLET, FILM COATED ORAL at 05:47

## 2020-09-20 RX ADMIN — Medication 325 MILLIGRAM(S): at 11:33

## 2020-09-20 RX ADMIN — SERTRALINE 50 MILLIGRAM(S): 25 TABLET, FILM COATED ORAL at 11:33

## 2020-09-20 NOTE — PROGRESS NOTE ADULT - ASSESSMENT
54 y/o F with pmhx anemia, depression, hx of TIA, HTN came to ED because of left arm leg and face numbness and weakness that has resolved.    Cardiologist: Dr. Lane     Assessment and Plan:   #Presumed TIA, symptoms resolved    patient stated for many years she has been having similar episodes of sudden onset palpitations , chest tightness, hypertension followed by unilateral numbness and tingling which resolves on it's own within minutes. Patient has had multiple imaging studies of the brain which were neg. She also has had stress tests, echo (including esophageal echo) , cardiac cath by her outpt cardiologist Dr. Lane but all studies have been negative including a loop recorder which was placed about 1 year ago but patient states that no sig events were found/recorded. patient denies stress or anxiety.   A1c 6.7%, LDL 81  ECHO pEF   Neurology consult appreciated   continue with ASA/Plavix/simvastatin     ruling out  Medulloadrenal hyperfunction  ruling out  heightened catecholamine secretion 2/2 neuroendocrine tumor   needs work-up   endocrinology consult appreciated   follow 24 urine catecholamines, started today AM    follow Check aldosterone ,  plasma metanephrine, and Cortisol levels   follow Chromogranin A ,although index of suspicion for Carcinoid is very low     #Essential HTN   continue with home meds     #Depression   no e/o SI/HI   continue with home meds     #Preventative measures   -heparin SQ-dvt ppx  general precautions      56 y/o F with pmhx anemia, depression, hx of TIA, HTN, loop recorder came to ED because of left arm leg and face numbness and weakness that has resolved.        Assessment and Plan:   #Presumed TIA, symptoms resolved    patient stated for many years she has been having similar episodes of sudden onset palpitations , chest tightness, hypertension followed by unilateral numbness and tingling which resolves on it's own within minutes. Patient has had multiple imaging studies of the brain which were neg. She also has had stress tests, echo (including esophageal echo) , cardiac cath by her outpt cardiologist Dr. Lane but all studies have been negative including a loop recorder which was placed about 1 year ago but patient states that no sig events were found/recorded. patient denies stress or anxiety.   A1c 6.7%, LDL 81  ECHO pEF   has loop recorder , follows with Cardiologist: Dr. Lane   Neurology following   continue with ASA/Plavix/simvastatin     ruling out  Medulloadrenal hyperfunction  ruling out  heightened catecholamine secretion 2/2 neuroendocrine tumor   needs work-up   endocrinology consult appreciated   follow 24 urine catecholamines, started today AM    follow Check aldosterone ,  plasma metanephrine, and Cortisol levels   follow Chromogranin A ,although index of suspicion for Carcinoid is very low     #Essential HTN   continue with home meds     #Depression   no e/o SI/HI   continue with home meds     #Preventative measures   -heparin SQ-dvt ppx  general precautions

## 2020-09-20 NOTE — PROGRESS NOTE ADULT - SUBJECTIVE AND OBJECTIVE BOX
Pt resting comfortably, speaking with family members.     VSS  Physical Exam:    Reference Recent Physical Exam:  · In accordance with current standards limiting patient contact please refer to the recent:	Inpatient Physical Exam  · Inpatient Physical Exam Document Referenced	AOX3, able to repeat, name and fluent. CN2-12 intact. SUERO 5/5. Sensation to LT intact in all extremities. reflexes +3 throughout. Toes mute b/l    LDL 81  A1C 6.7  echo 60-65% no LA dilatation  CTA neg  MRI neg    A/P  Likely TIA  HTN  DM  Past CVA      Plan:  continue ASA/Plavix for now  start lipitor 20mg  DM control  will need cardiologist outpt followup to interrogate loop recorder she has placed  neuro stable, follow up outpt; counseled on diet, BP control and exercise    Thank you for this consult  Violet Sullivan DO  [phone]

## 2020-09-20 NOTE — PROGRESS NOTE ADULT - SUBJECTIVE AND OBJECTIVE BOX
HPI:   54 y/o F with pmhx anemia, depression, hx of TIA, HTN came to ED because of left arm leg and face numbness and weakness that has resolved. x1 month ago she noted left numbness that resolved. she saw her doctor started on aspirin and Plavix and had outpatient MRI which she does not know results on. Today she noted left arm leg and face numbness and weakness. Resolved after she took 2 baby aspirin arrived to ED w/no complaints.   (17 Sep 2020 17:12)      SUBJECTIVE & OBJECTIVE: Pt seen and examined at bedside.   no overnight events.   feels well.     Denies fever, chills, N/V, dizziness, HA, cough, CP, palpitations, SOB, abdominal pain, dysuria, diarrhea, constipation.     PHYSICAL EXAM:  Vital Signs Last 24 Hrs  T(C): 36.9 (20 Sep 2020 05:12), Max: 37 (19 Sep 2020 11:13)  T(F): 98.4 (20 Sep 2020 05:12), Max: 98.6 (19 Sep 2020 11:13)  HR: 69 (20 Sep 2020 05:12) (64 - 72)  BP: 146/82 (20 Sep 2020 05:12) (127/66 - 152/80)  BP(mean): --  RR: 18 (20 Sep 2020 05:12) (17 - 18)  SpO2: 100% (20 Sep 2020 05:12) (97% - 100%) on RA     GENERAL: NAD, well-groomed, well-developed   HEAD:  Atraumatic, Normocephalic  EYES: EOMI, PERRLA, conjunctiva and sclera clear  ENMT: Moist mucous membranes  NECK: Supple, No JVD  NERVOUS SYSTEM:  Alert & Oriented X3, no facial droop, speech clear, strength 5/5 throughout, finger-nose test normal   CHEST/LUNG: Clear to auscultation bilaterally; No rales, rhonchi, wheezing, or rubs  HEART: Regular rate and rhythm; No murmurs, rubs, or gallops  ABDOMEN: Soft, Nontender, Nondistended; Bowel sounds present  EXTREMITIES:  2+ Peripheral Pulses b/l, No clubbing, cyanosis, or edema b/l     MEDICATIONS  (STANDING):  aspirin enteric coated 81 milliGRAM(s) Oral daily  ATENolol  Tablet 50 milliGRAM(s) Oral two times a day  clopidogrel Tablet 75 milliGRAM(s) Oral daily  diVALproex ER 1000 milliGRAM(s) Oral at bedtime  ferrous    sulfate 325 milliGRAM(s) Oral daily  folic acid 1 milliGRAM(s) Oral daily  heparin   Injectable 5000 Unit(s) SubCutaneous every 12 hours  influenza   Vaccine 0.5 milliLiter(s) IntraMuscular once  multivitamin 1 Tablet(s) Oral daily  NIFEdipine XL 30 milliGRAM(s) Oral daily  risperiDONE   Tablet 0.5 milliGRAM(s) Oral at bedtime  sertraline 50 milliGRAM(s) Oral daily  spironolactone 50 milliGRAM(s) Oral daily    MEDICATIONS  (PRN):      LABS:             no new labs       CARDIAC MARKERS ( 17 Sep 2020 15:28 )  <.015 ng/mL / x     / x     / x     / x        A1C with Estimated Average Glucose in AM (09.18.20 @ 11:12)    A1C with Estimated Average Glucose Result: 6.7: Method: Immunoassay       Reference Range                4.0-5.6%       High risk (prediabetic)        5.7-6.4%       Diabetic, diagnostic             >=6.5%       ADA diabetic treatment goal       <7.0%  The Hemoglobin A1c testing is NGSP-certified.Reference ranges are based  upon the 2010 recommendations of  the American Diabetes Association.  Interpretation may vary for children  and adolescents. %    Estimated Average Glucose: 146: The Estimated Average Glucose (eAG) or Mean Plasma Glucose (MPG) value is  calculated from the hemoglobin A1c value and covers the same time period.   The American Diabetes Association (ADA) and other professional  organizations recommend reporting the eAG with the HgbA1c. mg/dL    Lipid Profile in AM (09.18.20 @ 11:21)    Total Cholesterol/HDL Ratio Measurement: 3.5 RATIO    Cholesterol, Serum: 135 mg/dL    Triglycerides, Serum: 81 mg/dL    HDL Cholesterol, Serum: 38: HDL Levels >/= 60 mg/dL are considered beneficial and a "negative" risk  factor.  Effective 08/15/2018: New reference range and interpretive comment. mg/dL    Direct LDL: 81: LDL Cholesterol (mg/dL) --- Interpretive Comment (for adults 18 and over)  Optimal LDL Level may vary based on clinical situation  Below 70                   Ideal for people at very high risk of heart  disease  Below 100                  Ideal for people at risk of heart disease  100 - 129                    [place]  130 - 159                    Borderline high  160 - 189                    High  190 and Above           Very high mg/dL        RECENT CULTURES:      RADIOLOGY & ADDITIONAL TESTS:  < from: Xray Chest 1 View-PORTABLE IMMEDIATE (Xray Chest 1 View-PORTABLE IMMEDIATE .) (09.18.20 @ 15:17) >  INTERPRETATION:  PROCEDURE: AP view of the chest.    CLINICAL INFORMATION: Medical clearance for admission.    COMPARISON: 11/5/2019.    FINDINGS:    Cardiac loop recorder is noted.    Lungs: The lungs are clear.  Heart: The heart is normal in size.  Mediastinum: The mediastinum is within normal limits.    IMPRESSION:    Clear lungs.    < end of copied text >    < from: MR Head No Cont (09.18.20 @ 10:10) >  INTERPRETATION:  MRI OF THE BRAIN WITHOUT CONTRAST    CLINICAL INDICATION: Left-sided weakness.    TECHNIQUE: Multiplanar multisequence noncontrast MRI of the brain wasperformed.    COMPARISON: CT stroke series, 9/17/2020.    FINDINGS:    The ventricular and sulcal size and configuration is age appropriate. No significant signal abnormalities are seen in the brain parenchyma.    There is no acute infarction, intracranial hemorrhage, mass lesion, mass effect or herniation. There is no extra-axial fluid collection or hydrocephalus. There is an enlarged perivascular space in the left basal ganglia.    Flow-voids within the dominant cerebral arteries are preserved.    The visualized orbits are unremarkable.    The visualized paranasal sinuses and mastoid bones are adequately developed and aerated.      IMPRESSION:    There is no acute infarction, acute hemorrhage, cerebral edema, or intracranial mass effect.    < end of copied text >    < from: CT Angio Neck w/ IV Cont (09.17.20 @ 15:43) >  IMPRESSION:    CTA NECK: No vessel narrowing or occlusion in the neck.    CTA HEAD: No evidence of vascular stenosis, occlusion, aneurysm or vascular malformation.    < end of copied text >      < from: TTE Echo Complete w/o Contrast w/ Doppler (09.17.20 @ 19:24) >  Summary:   1. Left ventricular ejection fraction, by visual estimation, is 60 to 65%.   2. Technically good study.   3. Normal global left ventricular systolic function.   4. Normal left ventricular internal cavity size.   5. Normal left ventricular size and wall thicknesses, with normal systolic and diastolic function.   6. Normal right ventricular size and function.   7. Normal left atrial size.   8. Normal right atrial size.   9. There is no evidence of pericardial effusion.  10. Structurally normal mitral valve, with normal leaflet excursion.  11. Trace mitral valve regurgitation.  12. Mild tricuspid regurgitation.  13. Normal trileaflet aortic valve with normal opening.    < end of copied text >    < from: Xray Chest 1 View-PORTABLE IMMEDIATE (Xray Chest 1 View-PORTABLE IMMEDIATE .) (09.18.20 @ 15:17) >  PROCEDURE DATE:  09/18/2020          INTERPRETATION:  PROCEDURE: AP view of the chest.    CLINICAL INFORMATION: Medical clearance for admission.    COMPARISON: 11/5/2019.    FINDINGS:    Cardiac loop recorder is noted.    Lungs: The lungs are clear.  Heart: The heart is normal in size.  Mediastinum: The mediastinum is within normal limits.    IMPRESSION:    Clear lungs.      < end of copied text >        Imaging Personally Reviewed:  [x ] YES  [ ] NO    Consultant(s) Notes Reviewed:  [x ] YES  [ ] NO    Care Discussed with Consultants/Other Providers [ x] YES  [ ] NO    Care discussed in detail with patient.  All questions and concerns addressed     HPI:   56 y/o F with pmhx anemia, depression, hx of TIA, HTN came to ED because of left arm leg and face numbness and weakness that has resolved. x1 month ago she noted left numbness that resolved. she saw her doctor started on aspirin and Plavix and had outpatient MRI which she does not know results on. Today she noted left arm leg and face numbness and weakness. Resolved after she took 2 baby aspirin arrived to ED w/no complaints.   (17 Sep 2020 17:12)      SUBJECTIVE & OBJECTIVE: Pt seen and examined at bedside.   no overnight events.   feels well.     Denies fever, chills, N/V, dizziness, HA, cough, CP, palpitations, SOB, abdominal pain, dysuria, diarrhea, constipation.     PHYSICAL EXAM:  Vital Signs Last 24 Hrs  T(C): 36.9 (20 Sep 2020 05:12), Max: 37 (19 Sep 2020 11:13)  T(F): 98.4 (20 Sep 2020 05:12), Max: 98.6 (19 Sep 2020 11:13)  HR: 69 (20 Sep 2020 05:12) (64 - 72)  BP: 146/82 (20 Sep 2020 05:12) (127/66 - 152/80)  BP(mean): --  RR: 18 (20 Sep 2020 05:12) (17 - 18)  SpO2: 100% (20 Sep 2020 05:12) (97% - 100%) on RA     GENERAL: NAD, well-groomed, well-developed   HEAD:  Atraumatic, Normocephalic  EYES: EOMI, PERRLA, conjunctiva and sclera clear  ENMT: Moist mucous membranes  NECK: Supple, No JVD  NERVOUS SYSTEM:  Alert & Oriented X3, no facial droop, speech clear, strength 5/5 throughout, finger-nose test normal   CHEST/LUNG: Clear to auscultation bilaterally; No rales, rhonchi, wheezing, or rubs  HEART: Regular rate and rhythm; No murmurs, rubs, or gallops  ABDOMEN: Soft, Nontender, Nondistended; Bowel sounds present  EXTREMITIES:  2+ Peripheral Pulses b/l, No clubbing, cyanosis, or edema b/l     MEDICATIONS  (STANDING):  aspirin enteric coated 81 milliGRAM(s) Oral daily  ATENolol  Tablet 50 milliGRAM(s) Oral two times a day  clopidogrel Tablet 75 milliGRAM(s) Oral daily  diVALproex ER 1000 milliGRAM(s) Oral at bedtime  ferrous    sulfate 325 milliGRAM(s) Oral daily  folic acid 1 milliGRAM(s) Oral daily  heparin   Injectable 5000 Unit(s) SubCutaneous every 12 hours  influenza   Vaccine 0.5 milliLiter(s) IntraMuscular once  multivitamin 1 Tablet(s) Oral daily  NIFEdipine XL 30 milliGRAM(s) Oral daily  risperiDONE   Tablet 0.5 milliGRAM(s) Oral at bedtime  sertraline 50 milliGRAM(s) Oral daily  spironolactone 50 milliGRAM(s) Oral daily    MEDICATIONS  (PRN):      LABS:             no new labs       CARDIAC MARKERS ( 17 Sep 2020 15:28 )  <.015 ng/mL / x     / x     / x     / x        A1C with Estimated Average Glucose in AM (09.18.20 @ 11:12)    A1C with Estimated Average Glucose Result: 6.7: Method: Immunoassay       Reference Range                4.0-5.6%       High risk (prediabetic)        5.7-6.4%       Diabetic, diagnostic             >=6.5%       ADA diabetic treatment goal       <7.0%  The Hemoglobin A1c testing is NGSP-certified.Reference ranges are based  upon the 2010 recommendations of  the American Diabetes Association.  Interpretation may vary for children  and adolescents. %    Estimated Average Glucose: 146: The Estimated Average Glucose (eAG) or Mean Plasma Glucose (MPG) value is  calculated from the hemoglobin A1c value and covers the same time period.   The American Diabetes Association (ADA) and other professional  organizations recommend reporting the eAG with the HgbA1c. mg/dL    Lipid Profile in AM (09.18.20 @ 11:21)    Total Cholesterol/HDL Ratio Measurement: 3.5 RATIO    Cholesterol, Serum: 135 mg/dL    Triglycerides, Serum: 81 mg/dL    HDL Cholesterol, Serum: 38: HDL Levels >/= 60 mg/dL are considered beneficial and a "negative" risk  factor.  Effective 08/15/2018: New reference range and interpretive comment. mg/dL    Direct LDL: 81: LDL Cholesterol (mg/dL) --- Interpretive Comment (for adults 18 and over)  Optimal LDL Level may vary based on clinical situation  Below 70                   Ideal for people at very high risk of heart  disease  Below 100                  Ideal for people at risk of heart disease  100 - 129                    [place]  130 - 159                    Borderline high  160 - 189                    High  190 and Above           Very high mg/dL        RECENT CULTURES:      RADIOLOGY & ADDITIONAL TESTS:  < from: Xray Chest 1 View-PORTABLE IMMEDIATE (Xray Chest 1 View-PORTABLE IMMEDIATE .) (09.18.20 @ 15:17) >  INTERPRETATION:  PROCEDURE: AP view of the chest.    CLINICAL INFORMATION: Medical clearance for admission.    COMPARISON: 11/5/2019.    FINDINGS:    Cardiac loop recorder is noted.    Lungs: The lungs are clear.  Heart: The heart is normal in size.  Mediastinum: The mediastinum is within normal limits.    IMPRESSION:    Clear lungs.    < end of copied text >    < from: MR Head No Cont (09.18.20 @ 10:10) >  INTERPRETATION:  MRI OF THE BRAIN WITHOUT CONTRAST    CLINICAL INDICATION: Left-sided weakness.    TECHNIQUE: Multiplanar multisequence noncontrast MRI of the brain wasperformed.    COMPARISON: CT stroke series, 9/17/2020.    FINDINGS:    The ventricular and sulcal size and configuration is age appropriate. No significant signal abnormalities are seen in the brain parenchyma.    There is no acute infarction, intracranial hemorrhage, mass lesion, mass effect or herniation. There is no extra-axial fluid collection or hydrocephalus. There is an enlarged perivascular space in the left basal ganglia.    Flow-voids within the dominant cerebral arteries are preserved.    The visualized orbits are unremarkable.    The visualized paranasal sinuses and mastoid bones are adequately developed and aerated.      IMPRESSION:    There is no acute infarction, acute hemorrhage, cerebral edema, or intracranial mass effect.    < end of copied text >    < from: CT Angio Neck w/ IV Cont (09.17.20 @ 15:43) >  IMPRESSION:    CTA NECK: No vessel narrowing or occlusion in the neck.    CTA HEAD: No evidence of vascular stenosis, occlusion, aneurysm or vascular malformation.    < end of copied text >      < from: TTE Echo Complete w/o Contrast w/ Doppler (09.17.20 @ 19:24) >  Summary:   1. Left ventricular ejection fraction, by visual estimation, is 60 to 65%.   2. Technically good study.   3. Normal global left ventricular systolic function.   4. Normal left ventricular internal cavity size.   5. Normal left ventricular size and wall thicknesses, with normal systolic and diastolic function.   6. Normal right ventricular size and function.   7. Normal left atrial size.   8. Normal right atrial size.   9. There is no evidence of pericardial effusion.  10. Structurally normal mitral valve, with normal leaflet excursion.  11. Trace mitral valve regurgitation.  12. Mild tricuspid regurgitation.  13. Normal trileaflet aortic valve with normal opening.    < end of copied text >    < from: Xray Chest 1 View-PORTABLE IMMEDIATE (Xray Chest 1 View-PORTABLE IMMEDIATE .) (09.18.20 @ 15:17) >  PROCEDURE DATE:  09/18/2020          INTERPRETATION:  PROCEDURE: AP view of the chest.    CLINICAL INFORMATION: Medical clearance for admission.    COMPARISON: 11/5/2019.    FINDINGS:    Cardiac loop recorder is noted.    Lungs: The lungs are clear.  Heart: The heart is normal in size.  Mediastinum: The mediastinum is within normal limits.    IMPRESSION:    Clear lungs.      < end of copied text >      < from: MR Head No Cont (09.18.20 @ 10:10) >    FINDINGS:    The ventricular and sulcal size and configuration is age appropriate. No significant signal abnormalities are seen in the brain parenchyma.    There is no acute infarction, intracranial hemorrhage, mass lesion, mass effect or herniation. There is no extra-axial fluid collection or hydrocephalus. There is an enlarged perivascular space in the left basal ganglia.    Flow-voids within the dominant cerebral arteries are preserved.    The visualized orbits are unremarkable.    The visualized paranasal sinuses and mastoid bones are adequately developed and aerated.      IMPRESSION:    There is no acute infarction, acute hemorrhage, cerebral edema, or intracranial mass effect.    < end of copied text >    Imaging Personally Reviewed:  [x ] YES  [ ] NO    Consultant(s) Notes Reviewed:  [x ] YES  [ ] NO    Care Discussed with Consultants/Other Providers [ x] YES  [ ] NO    Care discussed in detail with patient.  All questions and concerns addressed

## 2020-09-20 NOTE — PROGRESS NOTE ADULT - SUBJECTIVE AND OBJECTIVE BOX
Patient is a 55y old  Female who presents with a chief complaint of tia (20 Sep 2020 14:28)      Interval History: finger sticks are stable   BP also is stable , labs for pheo are Pending     MEDICATIONS  (STANDING):  aspirin enteric coated 81 milliGRAM(s) Oral daily  ATENolol  Tablet 50 milliGRAM(s) Oral two times a day  clopidogrel Tablet 75 milliGRAM(s) Oral daily  diVALproex ER 1000 milliGRAM(s) Oral at bedtime  ferrous    sulfate 325 milliGRAM(s) Oral daily  folic acid 1 milliGRAM(s) Oral daily  heparin   Injectable 5000 Unit(s) SubCutaneous every 12 hours  influenza   Vaccine 0.5 milliLiter(s) IntraMuscular once  multivitamin 1 Tablet(s) Oral daily  NIFEdipine XL 30 milliGRAM(s) Oral daily  risperiDONE   Tablet 0.5 milliGRAM(s) Oral at bedtime  sertraline 50 milliGRAM(s) Oral daily  simvastatin 20 milliGRAM(s) Oral at bedtime  spironolactone 50 milliGRAM(s) Oral daily    MEDICATIONS  (PRN):  acetaminophen   Tablet .. 650 milliGRAM(s) Oral every 6 hours PRN Moderate Pain (4 - 6)      Allergies    Vicodin (Hives)  Vicodin (Rash)    Intolerances        REVIEW OF SYSTEMS:  CONSTITUTIONAL: no changes  EYES: No eye pain, visual disturbances, or discharge  ENMT:  No difficulty hearing, No sinus or throat pain  NECK: No pain or stiffness  RESPIRATORY: No cough, wheezing, chills or hemoptysis; No shortness of breath  CARDIOVASCULAR: No chest pain, palpitations or leg swelling  GASTROINTESTINAL: No abdominal or epigastric pain. No nausea, vomiting, or hematemesis; No diarrhea or constipation. No melena or hematochezia.  GENITOURINARY: No dysuria, frequency, hematuria, or incontinence  NEUROLOGICAL: No headaches, memory loss, loss of strength, numbness, or tremors  SKIN: No itching, burning, rashes, or lesions   ENDOCRINE: No heat or cold intolerance; No hair loss  MUSCULOSKELETAL: No joint pain or swelling; No muscle, back, or extremity pain  PSYCHIATRIC: No depression, anxiety, mood swings, or difficulty sleeping  HEME/LYMPH: No easy bruising, or bleeding gums  ALLERY AND IMMUNOLOGIC: No hives or eczema    Vital Signs Last 24 Hrs  T(C): 36.8 (20 Sep 2020 16:15), Max: 37.1 (20 Sep 2020 10:45)  T(F): 98.2 (20 Sep 2020 16:15), Max: 98.7 (20 Sep 2020 10:45)  HR: 70 (20 Sep 2020 16:15) (57 - 70)  BP: 139/80 (20 Sep 2020 16:15) (125/71 - 152/80)  BP(mean): --  RR: 18 (20 Sep 2020 16:15) (17 - 18)  SpO2: 99% (20 Sep 2020 16:15) (97% - 100%)    PHYSICAL EXAM:  GENERAL:   HEAD: Atraumatic, Normocephalic  EYES: PERRLA, conjunctiva and sclera clear  ENMT: No tonsillar erythema, exudates, or enlargement; Moist mucous membranes, Good dentition, No lesions  NECK: Supple, No JVD, Normal thyroid  NERVOUS SYSTEM:  Alert & Oriented X3, Good concentration; Motor Strength 5/5 B/L upper and lower extremities  CHEST/LUNG: Clear to auscultaion bilaterally; No rales, rhonchi, wheezing, or rubs  HEART: Regular rate and rhythm; No murmurs, rubs, or gallops  ABDOMEN: Soft, Nontender, Nondistended; Bowel sounds present  EXTREMITIES:  2+ Peripheral Pulses, no edema  SKIN: No rashes or lesions    LABS:        CAPILLARY BLOOD GLUCOSE        Lipid panel:           Thyroid:  Diabetes Tests:  Parathyroid Panel:  Adrenals:09-20 @ 12:14 ACTH -- Cortisol AM 7.4 Cortisol 24h U -- Aldosterone -- Metanephrine Plasma --    RADIOLOGY & ADDITIONAL TESTS:    Imaging Personally Reviewed:  [ ] YES  [ ] NO    Consultant(s) Notes Reviewed:  [ ] YES  [ ] NO    Care Discussed with Consultants/Other Providers [ ] YES  [ ] NO

## 2020-09-21 ENCOUNTER — TRANSCRIPTION ENCOUNTER (OUTPATIENT)
Age: 55
End: 2020-09-21

## 2020-09-21 VITALS
SYSTOLIC BLOOD PRESSURE: 125 MMHG | OXYGEN SATURATION: 99 % | RESPIRATION RATE: 17 BRPM | HEART RATE: 66 BPM | DIASTOLIC BLOOD PRESSURE: 71 MMHG | TEMPERATURE: 99 F

## 2020-09-21 DIAGNOSIS — E11.9 TYPE 2 DIABETES MELLITUS WITHOUT COMPLICATIONS: ICD-10-CM

## 2020-09-21 LAB — ALDOST SERPL-MCNC: 20.3 NG/DL — SIGNIFICANT CHANGE UP

## 2020-09-21 PROCEDURE — 99239 HOSP IP/OBS DSCHRG MGMT >30: CPT

## 2020-09-21 RX ADMIN — SERTRALINE 50 MILLIGRAM(S): 25 TABLET, FILM COATED ORAL at 12:38

## 2020-09-21 RX ADMIN — Medication 325 MILLIGRAM(S): at 12:38

## 2020-09-21 RX ADMIN — SPIRONOLACTONE 50 MILLIGRAM(S): 25 TABLET, FILM COATED ORAL at 05:54

## 2020-09-21 RX ADMIN — HEPARIN SODIUM 5000 UNIT(S): 5000 INJECTION INTRAVENOUS; SUBCUTANEOUS at 05:55

## 2020-09-21 RX ADMIN — ATENOLOL 50 MILLIGRAM(S): 25 TABLET ORAL at 05:54

## 2020-09-21 RX ADMIN — Medication 1 MILLIGRAM(S): at 12:38

## 2020-09-21 RX ADMIN — Medication 30 MILLIGRAM(S): at 05:55

## 2020-09-21 RX ADMIN — Medication 81 MILLIGRAM(S): at 12:40

## 2020-09-21 RX ADMIN — CLOPIDOGREL BISULFATE 75 MILLIGRAM(S): 75 TABLET, FILM COATED ORAL at 12:38

## 2020-09-21 RX ADMIN — Medication 1 TABLET(S): at 12:38

## 2020-09-21 NOTE — CHART NOTE - NSCHARTNOTEFT_GEN_A_CORE
Patient: Priscila Buckner  : 1965    To Whom It May Concern:    Please use this letter  as verfication that the above patient was hospitalized at [facility] from 20 - 20. Recommend that patient takes a few days off from work until she follows up with her primary care doctor and is cleared to return to work.     If any questions or concerns please call [phone].     Best Regards,  Rubia Benson MD

## 2020-09-21 NOTE — PROGRESS NOTE ADULT - PROBLEM SELECTOR PLAN 2
controlled  if bmp glucose elevated can consider adding MIGUELITO with meals while inpt  upon d/c, can resume metformin

## 2020-09-21 NOTE — DISCHARGE NOTE NURSING/CASE MANAGEMENT/SOCIAL WORK - PATIENT PORTAL LINK FT
You can access the FollowMyHealth Patient Portal offered by [facility] by registering at the following website: [email or web address]. By joining [facility]’s FollowMyHealth portal, you will also be able to view your health information using other applications (apps) compatible with our system.

## 2020-09-21 NOTE — PROGRESS NOTE ADULT - SUBJECTIVE AND OBJECTIVE BOX
Patient is a 55y old  Female who presents with a chief complaint of TIA (20 Sep 2020 21:09)      INTERVAL HPI/OVERNIGHT EVENTS:  pt with no complaints  feeling well    MEDICATIONS  (STANDING):  aspirin enteric coated 81 milliGRAM(s) Oral daily  ATENolol  Tablet 50 milliGRAM(s) Oral two times a day  clopidogrel Tablet 75 milliGRAM(s) Oral daily  diVALproex ER 1000 milliGRAM(s) Oral at bedtime  ferrous    sulfate 325 milliGRAM(s) Oral daily  folic acid 1 milliGRAM(s) Oral daily  heparin   Injectable 5000 Unit(s) SubCutaneous every 12 hours  influenza   Vaccine 0.5 milliLiter(s) IntraMuscular once  multivitamin 1 Tablet(s) Oral daily  NIFEdipine XL 30 milliGRAM(s) Oral daily  risperiDONE   Tablet 0.5 milliGRAM(s) Oral at bedtime  sertraline 50 milliGRAM(s) Oral daily  simvastatin 20 milliGRAM(s) Oral at bedtime  spironolactone 50 milliGRAM(s) Oral daily    MEDICATIONS  (PRN):  acetaminophen   Tablet .. 650 milliGRAM(s) Oral every 6 hours PRN Moderate Pain (4 - 6)      REVIEW OF SYSTEMS:  CONSTITUTIONAL: No fever, weight loss, or fatigue  RESPIRATORY: No cough, wheezing, chills or hemoptysis; No shortness of breath  CARDIOVASCULAR: No chest pain, palpitations, dizziness, or leg swelling  GASTROINTESTINAL: No abdominal or epigastric pain. No nausea, vomiting, or hematemesis; No diarrhea or constipation. No melena or hematochezia.  ENDOCRINE: No heat or cold intolerance; No hair loss      Vital Signs Last 24 Hrs  T(C): 37 (21 Sep 2020 05:32), Max: 37.1 (20 Sep 2020 10:45)  T(F): 98.6 (21 Sep 2020 05:32), Max: 98.7 (20 Sep 2020 10:45)  HR: 69 (21 Sep 2020 05:32) (66 - 70)  BP: 136/73 (21 Sep 2020 05:32) (125/71 - 139/80)  BP(mean): --  RR: 17 (21 Sep 2020 05:32) (17 - 18)  SpO2: 100% (21 Sep 2020 05:32) (98% - 100%)    PHYSICAL EXAM:  GENERAL: NAD, well-groomed, well-developed      LABS:              CAPILLARY BLOOD GLUCOSE        Lipid panel:               RADIOLOGY & ADDITIONAL TESTS:

## 2020-09-21 NOTE — PROGRESS NOTE ADULT - SUBJECTIVE AND OBJECTIVE BOX
Pt resting comfortably, speaking with family members.     VSS  Physical Exam:    Reference Recent Physical Exam:  · In accordance with current standards limiting patient contact please refer to the recent:	Inpatient Physical Exam  · Inpatient Physical Exam Document Referenced	AOX3, able to repeat, name and fluent. CN2-12 intact. SUERO 5/5. Sensation to LT intact in all extremities. reflexes +3 throughout. Toes mute b/l    LDL 81  A1C 6.7  echo 60-65% no LA dilatation  CTA neg  MRI neg    A/P  Likely TIA  HTN  DM  Past CVA      Plan:  continue ASA/Plavix for now  start lipitor 20mg  DM control   interrogate loop recorder she has placed  neuro stable, follow up outpt; counseled on diet, BP control and exercise    Thank you for this consult  Violet Sullivan DO  [phone]

## 2020-09-21 NOTE — CHART NOTE - NSCHARTNOTEFT_GEN_A_CORE
GENERAL: NAD, well-groomed, well-developed   HEAD:  Atraumatic, Normocephalic  EYES: EOMI, PERRLA, conjunctiva and sclera clear  ENMT: Moist mucous membranes  NECK: Supple, No JVD  NERVOUS SYSTEM:  Alert & Oriented X3, no facial droop, speech clear, strength 5/5 throughout, finger-nose test normal   CHEST/LUNG: Clear to auscultation bilaterally; No rales, rhonchi, wheezing, or rubs  HEART: Regular rate and rhythm; No murmurs, rubs, or gallops  ABDOMEN: Soft, Nontender, Nondistended; Bowel sounds present  EXTREMITIES:  2+ Peripheral Pulses b/l, No clubbing, cyanosis, or edema b/l     patient d/c today  care plan discussed with patient. all questions and concerns addressed.   see d/c note

## 2020-09-22 LAB — CGA FLD-MCNC: 30 NG/ML — SIGNIFICANT CHANGE UP

## 2020-09-24 DIAGNOSIS — D64.9 ANEMIA, UNSPECIFIED: ICD-10-CM

## 2020-09-24 DIAGNOSIS — G45.9 TRANSIENT CEREBRAL ISCHEMIC ATTACK, UNSPECIFIED: ICD-10-CM

## 2020-09-24 DIAGNOSIS — Z79.02 LONG TERM (CURRENT) USE OF ANTITHROMBOTICS/ANTIPLATELETS: ICD-10-CM

## 2020-09-24 DIAGNOSIS — F32.9 MAJOR DEPRESSIVE DISORDER, SINGLE EPISODE, UNSPECIFIED: ICD-10-CM

## 2020-09-24 DIAGNOSIS — I10 ESSENTIAL (PRIMARY) HYPERTENSION: ICD-10-CM

## 2020-09-24 DIAGNOSIS — Z79.82 LONG TERM (CURRENT) USE OF ASPIRIN: ICD-10-CM

## 2020-09-24 DIAGNOSIS — Z85.51 PERSONAL HISTORY OF MALIGNANT NEOPLASM OF BLADDER: ICD-10-CM

## 2020-09-24 DIAGNOSIS — R20.0 ANESTHESIA OF SKIN: ICD-10-CM

## 2020-09-24 DIAGNOSIS — Z86.73 PERSONAL HISTORY OF TRANSIENT ISCHEMIC ATTACK (TIA), AND CEREBRAL INFARCTION WITHOUT RESIDUAL DEFICITS: ICD-10-CM

## 2020-09-24 DIAGNOSIS — R53.1 WEAKNESS: ICD-10-CM

## 2020-09-24 DIAGNOSIS — Z85.44 PERSONAL HISTORY OF MALIGNANT NEOPLASM OF OTHER FEMALE GENITAL ORGANS: ICD-10-CM

## 2020-09-24 DIAGNOSIS — E11.9 TYPE 2 DIABETES MELLITUS WITHOUT COMPLICATIONS: ICD-10-CM

## 2020-09-24 DIAGNOSIS — Z79.899 OTHER LONG TERM (CURRENT) DRUG THERAPY: ICD-10-CM

## 2020-09-24 DIAGNOSIS — Z88.5 ALLERGY STATUS TO NARCOTIC AGENT: ICD-10-CM

## 2020-09-25 LAB — RENIN PLAS-CCNC: 0.5 NG/ML/HR — SIGNIFICANT CHANGE UP (ref 0.17–5.38)

## 2020-09-26 LAB
DOPAMINE - URINE 24 HOUR: 243 UG/24 HR — SIGNIFICANT CHANGE UP (ref 0–510)
DOPAMINE UR-MCNC: 77 UG/L — SIGNIFICANT CHANGE UP
EPINEPH UR-MCNC: 1 UG/L — SIGNIFICANT CHANGE UP
EPINEPHRINE - URINE, 24 HOUR: 3 UG/24 HR — SIGNIFICANT CHANGE UP (ref 0–20)
METANEPHRINE, PL: 11.1 PG/ML — SIGNIFICANT CHANGE UP (ref 0–88)
NOREPINEPH UR-MCNC: 15 UG/L — SIGNIFICANT CHANGE UP
NOREPINEPHRINE - URINE, 24 HOUR: 47 UG/24 HR — SIGNIFICANT CHANGE UP (ref 0–135)
NORMETANEPHRINE, PL: 45.1 PG/ML — SIGNIFICANT CHANGE UP (ref 0–136.8)

## 2020-09-28 LAB
METANEPHRINE, PL: 30.6 PG/ML — SIGNIFICANT CHANGE UP (ref 0–88)
NORMETANEPHRINE, PL: 71.8 PG/ML — SIGNIFICANT CHANGE UP (ref 0–136.8)

## 2020-10-10 ENCOUNTER — INPATIENT (INPATIENT)
Facility: HOSPITAL | Age: 55
LOS: 1 days | Discharge: HOME | End: 2020-10-12
Attending: INTERNAL MEDICINE | Admitting: INTERNAL MEDICINE
Payer: COMMERCIAL

## 2020-10-10 VITALS
TEMPERATURE: 99 F | WEIGHT: 179.9 LBS | RESPIRATION RATE: 19 BRPM | DIASTOLIC BLOOD PRESSURE: 92 MMHG | HEART RATE: 87 BPM | HEIGHT: 66 IN | OXYGEN SATURATION: 97 % | SYSTOLIC BLOOD PRESSURE: 154 MMHG

## 2020-10-10 DIAGNOSIS — Z90.710 ACQUIRED ABSENCE OF BOTH CERVIX AND UTERUS: Chronic | ICD-10-CM

## 2020-10-10 LAB
ALBUMIN SERPL ELPH-MCNC: 3.7 G/DL — SIGNIFICANT CHANGE UP (ref 3.3–5)
ALP SERPL-CCNC: 56 U/L — SIGNIFICANT CHANGE UP (ref 40–120)
ALT FLD-CCNC: 26 U/L — SIGNIFICANT CHANGE UP (ref 12–78)
ANION GAP SERPL CALC-SCNC: 7 MMOL/L — SIGNIFICANT CHANGE UP (ref 5–17)
APTT BLD: 31.4 SEC — SIGNIFICANT CHANGE UP (ref 27.5–35.5)
AST SERPL-CCNC: 18 U/L — SIGNIFICANT CHANGE UP (ref 15–37)
BASOPHILS # BLD AUTO: 0.03 K/UL — SIGNIFICANT CHANGE UP (ref 0–0.2)
BASOPHILS NFR BLD AUTO: 0.6 % — SIGNIFICANT CHANGE UP (ref 0–2)
BILIRUB SERPL-MCNC: 0.2 MG/DL — SIGNIFICANT CHANGE UP (ref 0.2–1.2)
BUN SERPL-MCNC: 11 MG/DL — SIGNIFICANT CHANGE UP (ref 7–23)
CALCIUM SERPL-MCNC: 8.5 MG/DL — SIGNIFICANT CHANGE UP (ref 8.5–10.1)
CHLORIDE SERPL-SCNC: 107 MMOL/L — SIGNIFICANT CHANGE UP (ref 96–108)
CO2 SERPL-SCNC: 28 MMOL/L — SIGNIFICANT CHANGE UP (ref 22–31)
CREAT SERPL-MCNC: 0.71 MG/DL — SIGNIFICANT CHANGE UP (ref 0.5–1.3)
EOSINOPHIL # BLD AUTO: 0.11 K/UL — SIGNIFICANT CHANGE UP (ref 0–0.5)
EOSINOPHIL NFR BLD AUTO: 2 % — SIGNIFICANT CHANGE UP (ref 0–6)
GLUCOSE SERPL-MCNC: 176 MG/DL — HIGH (ref 70–99)
HCT VFR BLD CALC: 37.5 % — SIGNIFICANT CHANGE UP (ref 34.5–45)
HGB BLD-MCNC: 11.1 G/DL — LOW (ref 11.5–15.5)
IMM GRANULOCYTES NFR BLD AUTO: 0.2 % — SIGNIFICANT CHANGE UP (ref 0–1.5)
INR BLD: 1.13 RATIO — SIGNIFICANT CHANGE UP (ref 0.88–1.16)
LYMPHOCYTES # BLD AUTO: 2.74 K/UL — SIGNIFICANT CHANGE UP (ref 1–3.3)
LYMPHOCYTES # BLD AUTO: 50.3 % — HIGH (ref 13–44)
MAGNESIUM SERPL-MCNC: 2.1 MG/DL — SIGNIFICANT CHANGE UP (ref 1.6–2.6)
MCHC RBC-ENTMCNC: 22.3 PG — LOW (ref 27–34)
MCHC RBC-ENTMCNC: 29.6 GM/DL — LOW (ref 32–36)
MCV RBC AUTO: 75.5 FL — LOW (ref 80–100)
MONOCYTES # BLD AUTO: 0.42 K/UL — SIGNIFICANT CHANGE UP (ref 0–0.9)
MONOCYTES NFR BLD AUTO: 7.7 % — SIGNIFICANT CHANGE UP (ref 2–14)
NEUTROPHILS # BLD AUTO: 2.14 K/UL — SIGNIFICANT CHANGE UP (ref 1.8–7.4)
NEUTROPHILS NFR BLD AUTO: 39.2 % — LOW (ref 43–77)
NRBC # BLD: 0 /100 WBCS — SIGNIFICANT CHANGE UP (ref 0–0)
PLATELET # BLD AUTO: 179 K/UL — SIGNIFICANT CHANGE UP (ref 150–400)
POTASSIUM SERPL-MCNC: 3.5 MMOL/L — SIGNIFICANT CHANGE UP (ref 3.5–5.3)
POTASSIUM SERPL-SCNC: 3.5 MMOL/L — SIGNIFICANT CHANGE UP (ref 3.5–5.3)
PROT SERPL-MCNC: 8 GM/DL — SIGNIFICANT CHANGE UP (ref 6–8.3)
PROTHROM AB SERPL-ACNC: 13 SEC — SIGNIFICANT CHANGE UP (ref 10.6–13.6)
RBC # BLD: 4.97 M/UL — SIGNIFICANT CHANGE UP (ref 3.8–5.2)
RBC # FLD: 14.4 % — SIGNIFICANT CHANGE UP (ref 10.3–14.5)
SODIUM SERPL-SCNC: 142 MMOL/L — SIGNIFICANT CHANGE UP (ref 135–145)
TROPONIN I SERPL-MCNC: <.015 NG/ML — SIGNIFICANT CHANGE UP (ref 0.01–0.04)
WBC # BLD: 5.45 K/UL — SIGNIFICANT CHANGE UP (ref 3.8–10.5)
WBC # FLD AUTO: 5.45 K/UL — SIGNIFICANT CHANGE UP (ref 3.8–10.5)

## 2020-10-10 PROCEDURE — 70450 CT HEAD/BRAIN W/O DYE: CPT | Mod: 26,MA

## 2020-10-10 PROCEDURE — 99285 EMERGENCY DEPT VISIT HI MDM: CPT

## 2020-10-10 PROCEDURE — 93010 ELECTROCARDIOGRAM REPORT: CPT

## 2020-10-10 NOTE — ED PROVIDER NOTE - CLINICAL SUMMARY MEDICAL DECISION MAKING FREE TEXT BOX
Pt well appearing, symptoms resolved, will admit for obs/TIA. Unlikely given multiple years of similar, though more often recently, ? pheochrom vs ? atypical seizures ? vasculitis. will admit. d/w dr kimble for admission.

## 2020-10-10 NOTE — ED ADULT NURSE NOTE - OBJECTIVE STATEMENT
Patient presents in ED complaining of headache 45 MINUTES AGO , PAIN 3/10, earlier weakness in limbs , blurry vision and difficulty talking, these symptoms have since resolved.

## 2020-10-10 NOTE — ED ADULT TRIAGE NOTE - NS ED TRIAGE HISTORIAN
9S PT Note: Writer spoke with ALEJANDRA Parkinson this morning about completing pre-assessment for lumbar puncture today. Per RN the pt has decided she does not want to have the lumbar puncture now. Writer provided RN my pager in case pt changes her mind for writer to complete assessment. Otherwise will see pt this afternoon for PT session.    Patient

## 2020-10-10 NOTE — ED PROVIDER NOTE - OBJECTIVE STATEMENT
56yo female with pmh anemia, depression, hx of TIA (most recently last month), HTN, loop recorder presents with acute onset head pressure 30 min ago and then felt her legs were heavy and found it difficult to get her words out. This lasted ~ 5min. And normally she has significant inc in her BP at this time. Everything has resolved, only mild tension headache now.  pt had CT and CTA head and neck and MRI last month without acute pathology. Pt reports has had little minor episodes where she can't speak/head pressure/palpitations/HTN with weakness for many years that resolves within min with mult brain imagina/stress test/echo that have been normal. Pt seen by cardiologist, states loop recorder with no events, but did start on ?xarelto recently for TIAs. Pt to be seen by neurologist next week and endocrinologist to possibly r/o ? pheochromocytoma/medulloadrenal hyperfunction.     cardiologist Dr. Lane    No fever/chills, No photophobia/eye pain/changes in vision, No ear pain/sore throat/dysphagia, No chest pain/palpitations, no SOB/cough, no wheeze/stridor, No abdominal pain, No N/V/D, no dysuria/frequency/discharge, No neck/back pain, no rash, + Head pressure/difficulty speaking

## 2020-10-10 NOTE — ED ADULT TRIAGE NOTE - CHIEF COMPLAINT QUOTE
sudden onset of severe headache x 45 mins ago, denies blurry vision, denies n/v/dizziness, no weakness, no facial droop  hx of tia, htn, dm, depression, denies si/hi. md damon aware

## 2020-10-10 NOTE — ED ADULT NURSE NOTE - NSIMPLEMENTINTERV_GEN_ALL_ED
Implemented All Universal Safety Interventions:  [place] to call system. Call bell, personal items and telephone within reach. Instruct patient to call for assistance. Room bathroom lighting operational. Non-slip footwear when patient is off stretcher. Physically safe environment: no spills, clutter or unnecessary equipment. Stretcher in lowest position, wheels locked, appropriate side rails in place.

## 2020-10-11 DIAGNOSIS — G45.9 TRANSIENT CEREBRAL ISCHEMIC ATTACK, UNSPECIFIED: ICD-10-CM

## 2020-10-11 DIAGNOSIS — I10 ESSENTIAL (PRIMARY) HYPERTENSION: ICD-10-CM

## 2020-10-11 LAB
A1C WITH ESTIMATED AVERAGE GLUCOSE RESULT: 6.9 % — HIGH (ref 4–5.6)
CHOLEST SERPL-MCNC: 82 MG/DL — SIGNIFICANT CHANGE UP (ref 10–199)
ESTIMATED AVERAGE GLUCOSE: 151 MG/DL — HIGH (ref 68–114)
GLUCOSE BLDC GLUCOMTR-MCNC: 110 MG/DL — HIGH (ref 70–99)
HDLC SERPL-MCNC: 37 MG/DL — LOW
LIPID PNL WITH DIRECT LDL SERPL: 35 MG/DL — SIGNIFICANT CHANGE UP
SARS-COV-2 RNA SPEC QL NAA+PROBE: SIGNIFICANT CHANGE UP
TOTAL CHOLESTEROL/HDL RATIO MEASUREMENT: 2.2 RATIO — LOW (ref 3.3–7.1)
TRIGL SERPL-MCNC: 47 MG/DL — SIGNIFICANT CHANGE UP (ref 10–149)

## 2020-10-11 RX ORDER — INFLUENZA VIRUS VACCINE 15; 15; 15; 15 UG/.5ML; UG/.5ML; UG/.5ML; UG/.5ML
0.5 SUSPENSION INTRAMUSCULAR ONCE
Refills: 0 | Status: COMPLETED | OUTPATIENT
Start: 2020-10-11 | End: 2020-10-11

## 2020-10-11 RX ORDER — METFORMIN HYDROCHLORIDE 850 MG/1
500 TABLET ORAL
Refills: 0 | Status: DISCONTINUED | OUTPATIENT
Start: 2020-10-11 | End: 2020-10-12

## 2020-10-11 RX ORDER — SPIRONOLACTONE 25 MG/1
50 TABLET, FILM COATED ORAL DAILY
Refills: 0 | Status: DISCONTINUED | OUTPATIENT
Start: 2020-10-11 | End: 2020-10-12

## 2020-10-11 RX ORDER — FOLIC ACID 0.8 MG
1 TABLET ORAL DAILY
Refills: 0 | Status: DISCONTINUED | OUTPATIENT
Start: 2020-10-11 | End: 2020-10-12

## 2020-10-11 RX ORDER — SERTRALINE 25 MG/1
50 TABLET, FILM COATED ORAL DAILY
Refills: 0 | Status: DISCONTINUED | OUTPATIENT
Start: 2020-10-11 | End: 2020-10-12

## 2020-10-11 RX ORDER — ASPIRIN/CALCIUM CARB/MAGNESIUM 324 MG
300 TABLET ORAL DAILY
Refills: 0 | Status: DISCONTINUED | OUTPATIENT
Start: 2020-10-11 | End: 2020-10-11

## 2020-10-11 RX ORDER — RIVAROXABAN 15 MG-20MG
20 KIT ORAL
Refills: 0 | Status: DISCONTINUED | OUTPATIENT
Start: 2020-10-11 | End: 2020-10-12

## 2020-10-11 RX ORDER — SIMVASTATIN 20 MG/1
20 TABLET, FILM COATED ORAL AT BEDTIME
Refills: 0 | Status: DISCONTINUED | OUTPATIENT
Start: 2020-10-11 | End: 2020-10-12

## 2020-10-11 RX ORDER — RISPERIDONE 4 MG/1
0.5 TABLET ORAL AT BEDTIME
Refills: 0 | Status: DISCONTINUED | OUTPATIENT
Start: 2020-10-11 | End: 2020-10-12

## 2020-10-11 RX ORDER — ATENOLOL 25 MG/1
100 TABLET ORAL DAILY
Refills: 0 | Status: DISCONTINUED | OUTPATIENT
Start: 2020-10-11 | End: 2020-10-12

## 2020-10-11 RX ORDER — ASPIRIN/CALCIUM CARB/MAGNESIUM 324 MG
81 TABLET ORAL DAILY
Refills: 0 | Status: DISCONTINUED | OUTPATIENT
Start: 2020-10-11 | End: 2020-10-12

## 2020-10-11 RX ORDER — DIVALPROEX SODIUM 500 MG/1
1000 TABLET, DELAYED RELEASE ORAL AT BEDTIME
Refills: 0 | Status: DISCONTINUED | OUTPATIENT
Start: 2020-10-11 | End: 2020-10-12

## 2020-10-11 RX ADMIN — SPIRONOLACTONE 50 MILLIGRAM(S): 25 TABLET, FILM COATED ORAL at 06:09

## 2020-10-11 RX ADMIN — DIVALPROEX SODIUM 1000 MILLIGRAM(S): 500 TABLET, DELAYED RELEASE ORAL at 21:32

## 2020-10-11 RX ADMIN — RISPERIDONE 0.5 MILLIGRAM(S): 4 TABLET ORAL at 21:32

## 2020-10-11 RX ADMIN — RIVAROXABAN 20 MILLIGRAM(S): KIT at 17:20

## 2020-10-11 RX ADMIN — Medication 1 MILLIGRAM(S): at 12:21

## 2020-10-11 RX ADMIN — METFORMIN HYDROCHLORIDE 500 MILLIGRAM(S): 850 TABLET ORAL at 17:20

## 2020-10-11 RX ADMIN — ATENOLOL 100 MILLIGRAM(S): 25 TABLET ORAL at 06:09

## 2020-10-11 RX ADMIN — SERTRALINE 50 MILLIGRAM(S): 25 TABLET, FILM COATED ORAL at 12:21

## 2020-10-11 RX ADMIN — SIMVASTATIN 20 MILLIGRAM(S): 20 TABLET, FILM COATED ORAL at 21:33

## 2020-10-11 RX ADMIN — Medication 1 TABLET(S): at 12:21

## 2020-10-11 RX ADMIN — METFORMIN HYDROCHLORIDE 500 MILLIGRAM(S): 850 TABLET ORAL at 06:09

## 2020-10-11 NOTE — PROGRESS NOTE ADULT - SUBJECTIVE AND OBJECTIVE BOX
Patient is a 55y old  Female who presents with a chief complaint of     INTERVAL HPI/OVERNIGHT EVENTS:  pt is feeling better   MEDICATIONS  (STANDING):  aspirin enteric coated 81 milliGRAM(s) Oral daily  ATENolol  Tablet 100 milliGRAM(s) Oral daily  diVALproex ER 1000 milliGRAM(s) Oral at bedtime  folic acid 1 milliGRAM(s) Oral daily  influenza   Vaccine 0.5 milliLiter(s) IntraMuscular once  metFORMIN 500 milliGRAM(s) Oral two times a day  multivitamin 1 Tablet(s) Oral daily  risperiDONE   Tablet 0.5 milliGRAM(s) Oral at bedtime  rivaroxaban 20 milliGRAM(s) Oral with dinner  sertraline 50 milliGRAM(s) Oral daily  simvastatin 20 milliGRAM(s) Oral at bedtime  spironolactone 50 milliGRAM(s) Oral daily    MEDICATIONS  (PRN):      Allergies    Vicodin (Hives)  Vicodin (Rash)    Intolerances        REVIEW OF SYSTEMS:  CONSTITUTIONAL: No fever, weight loss, or fatigue  EYES: No eye pain, visual disturbances, or discharge  ENMT:  No difficulty hearing, tinnitus, vertigo; No sinus or throat pain  NECK: No pain or stiffness  BREASTS: No pain, masses, or nipple discharge  RESPIRATORY: No cough, wheezing, chills or hemoptysis; No shortness of breath  CARDIOVASCULAR: No chest pain, palpitations, dizziness, or leg swelling  GASTROINTESTINAL: No abdominal or epigastric pain. No nausea, vomiting, or hematemesis; No diarrhea or constipation. No melena or hematochezia.  GENITOURINARY: No dysuria, frequency, hematuria, or incontinence  NEUROLOGICAL: No headaches, memory loss, loss of strength, numbness, or tremors  SKIN: No itching, burning, rashes, or lesions   LYMPH NODES: No enlarged glands  ENDOCRINE: No heat or cold intolerance; No hair loss  MUSCULOSKELETAL: No joint pain or swelling; No muscle, back, or extremity pain  PSYCHIATRIC: No depression, anxiety, mood swings, or difficulty sleeping  HEME/LYMPH: No easy bruising, or bleeding gums  ALLERGY AND IMMUNOLOGIC: No hives or eczema    Vital Signs Last 24 Hrs  T(C): 37.4 (11 Oct 2020 11:15), Max: 37.4 (11 Oct 2020 11:15)  T(F): 99.3 (11 Oct 2020 11:15), Max: 99.3 (11 Oct 2020 11:15)  HR: 67 (11 Oct 2020 13:06) (63 - 87)  BP: 147/83 (11 Oct 2020 11:15) (131/63 - 163/78)  BP(mean): --  RR: 20 (11 Oct 2020 11:15) (16 - 20)  SpO2: 99% (11 Oct 2020 11:15) (97% - 100%)    PHYSICAL EXAM:  GENERAL: NAD, well-groomed, well-developed  HEAD:  Atraumatic, Normocephalic  EYES: EOMI, PERRLA, conjunctiva and sclera clear  ENMT: No tonsillar erythema, exudates, or enlargement; Moist mucous membranes, Good dentition, No lesions  NECK: Supple, No JVD, Normal thyroid  NERVOUS SYSTEM:  Alert & Oriented X3, Good concentration; Motor Strength 5/5 B/L upper and lower extremities; DTRs 2+ intact and symmetric  CHEST/LUNG: Clear to percussion bilaterally; No rales, rhonchi, wheezing, or rubs  HEART: Regular rate and rhythm; No murmurs, rubs, or gallops  ABDOMEN: Soft, Nontender, Nondistended; Bowel sounds present  EXTREMITIES:  2+ Peripheral Pulses, No clubbing, cyanosis, or edema  LYMPH: No lymphadenopathy noted  SKIN: No rashes or lesions    LABS:                        11.1   5.45  )-----------( 179      ( 10 Oct 2020 22:02 )             37.5     10-10    142  |  107  |  11  ----------------------------<  176<H>  3.5   |  28  |  0.71    Ca    8.5      10 Oct 2020 22:02  Mg     2.1     10-10    TPro  8.0  /  Alb  3.7  /  TBili  0.2  /  DBili  x   /  AST  18  /  ALT  26  /  AlkPhos  56  10-10    PT/INR - ( 10 Oct 2020 23:08 )   PT: 13.0 sec;   INR: 1.13 ratio         PTT - ( 10 Oct 2020 23:08 )  PTT:31.4 sec    CAPILLARY BLOOD GLUCOSE      POCT Blood Glucose.: 110 mg/dL (11 Oct 2020 07:31)    CULTURES:    HEMOGLOBIN A1C:    CHOLESTEROL:  Cholesterol, Serum: 82 mg/dL (10-11-20 @ 11:51)  HDL Cholesterol, Serum: 37 mg/dL (10-11-20 @ 11:51)  Triglycerides, Serum: 47 mg/dL (10-11-20 @ 11:51)        RADIOLOGY & ADDITIONAL TESTS:

## 2020-10-12 ENCOUNTER — TRANSCRIPTION ENCOUNTER (OUTPATIENT)
Age: 55
End: 2020-10-12

## 2020-10-12 VITALS
DIASTOLIC BLOOD PRESSURE: 90 MMHG | SYSTOLIC BLOOD PRESSURE: 140 MMHG | OXYGEN SATURATION: 98 % | RESPIRATION RATE: 18 BRPM | HEART RATE: 60 BPM | TEMPERATURE: 98 F

## 2020-10-12 LAB
ANION GAP SERPL CALC-SCNC: 6 MMOL/L — SIGNIFICANT CHANGE UP (ref 5–17)
BUN SERPL-MCNC: 12 MG/DL — SIGNIFICANT CHANGE UP (ref 7–23)
CALCIUM SERPL-MCNC: 8.6 MG/DL — SIGNIFICANT CHANGE UP (ref 8.5–10.1)
CHLORIDE SERPL-SCNC: 108 MMOL/L — SIGNIFICANT CHANGE UP (ref 96–108)
CO2 SERPL-SCNC: 29 MMOL/L — SIGNIFICANT CHANGE UP (ref 22–31)
CREAT SERPL-MCNC: 0.69 MG/DL — SIGNIFICANT CHANGE UP (ref 0.5–1.3)
GLUCOSE SERPL-MCNC: 111 MG/DL — HIGH (ref 70–99)
HCT VFR BLD CALC: 37.1 % — SIGNIFICANT CHANGE UP (ref 34.5–45)
HGB BLD-MCNC: 10.8 G/DL — LOW (ref 11.5–15.5)
MCHC RBC-ENTMCNC: 22.2 PG — LOW (ref 27–34)
MCHC RBC-ENTMCNC: 29.1 GM/DL — LOW (ref 32–36)
MCV RBC AUTO: 76.2 FL — LOW (ref 80–100)
NRBC # BLD: 0 /100 WBCS — SIGNIFICANT CHANGE UP (ref 0–0)
PLATELET # BLD AUTO: 156 K/UL — SIGNIFICANT CHANGE UP (ref 150–400)
POTASSIUM SERPL-MCNC: 4.2 MMOL/L — SIGNIFICANT CHANGE UP (ref 3.5–5.3)
POTASSIUM SERPL-SCNC: 4.2 MMOL/L — SIGNIFICANT CHANGE UP (ref 3.5–5.3)
RBC # BLD: 4.87 M/UL — SIGNIFICANT CHANGE UP (ref 3.8–5.2)
RBC # FLD: 14.5 % — SIGNIFICANT CHANGE UP (ref 10.3–14.5)
SODIUM SERPL-SCNC: 143 MMOL/L — SIGNIFICANT CHANGE UP (ref 135–145)
WBC # BLD: 5.52 K/UL — SIGNIFICANT CHANGE UP (ref 3.8–10.5)
WBC # FLD AUTO: 5.52 K/UL — SIGNIFICANT CHANGE UP (ref 3.8–10.5)

## 2020-10-12 RX ORDER — RIVAROXABAN 15 MG-20MG
1 KIT ORAL
Qty: 30 | Refills: 0
Start: 2020-10-12 | End: 2020-11-10

## 2020-10-12 RX ORDER — ACETAMINOPHEN 500 MG
650 TABLET ORAL EVERY 6 HOURS
Refills: 0 | Status: DISCONTINUED | OUTPATIENT
Start: 2020-10-12 | End: 2020-10-12

## 2020-10-12 RX ORDER — FONDAPARINUX SODIUM 2.5 MG/.5ML
1 INJECTION, SOLUTION SUBCUTANEOUS
Qty: 0 | Refills: 0 | DISCHARGE

## 2020-10-12 RX ORDER — ASPIRIN/CALCIUM CARB/MAGNESIUM 324 MG
1 TABLET ORAL
Qty: 0 | Refills: 0 | DISCHARGE
Start: 2020-10-12

## 2020-10-12 RX ADMIN — Medication 650 MILLIGRAM(S): at 09:44

## 2020-10-12 RX ADMIN — Medication 1 TABLET(S): at 11:29

## 2020-10-12 RX ADMIN — Medication 650 MILLIGRAM(S): at 10:30

## 2020-10-12 RX ADMIN — Medication 1 MILLIGRAM(S): at 11:29

## 2020-10-12 RX ADMIN — SPIRONOLACTONE 50 MILLIGRAM(S): 25 TABLET, FILM COATED ORAL at 05:28

## 2020-10-12 RX ADMIN — METFORMIN HYDROCHLORIDE 500 MILLIGRAM(S): 850 TABLET ORAL at 05:28

## 2020-10-12 RX ADMIN — SERTRALINE 50 MILLIGRAM(S): 25 TABLET, FILM COATED ORAL at 11:29

## 2020-10-12 RX ADMIN — ATENOLOL 100 MILLIGRAM(S): 25 TABLET ORAL at 05:28

## 2020-10-12 NOTE — DISCHARGE NOTE PROVIDER - CARE PROVIDER_API CALL
Jules Esteban  INTERNAL MEDICINE  [address] [place] [address]  Phone: [phone]  Fax: [phone]  Follow Up Time:

## 2020-10-12 NOTE — DISCHARGE NOTE PROVIDER - NSDCCPCAREPLAN_GEN_ALL_CORE_FT
PRINCIPAL DISCHARGE DIAGNOSIS  Diagnosis: TIA (transient ischemic attack)  Assessment and Plan of Treatment:       SECONDARY DISCHARGE DIAGNOSES  Diagnosis: Depression  Assessment and Plan of Treatment:     Diagnosis: Anemia  Assessment and Plan of Treatment:     Diagnosis: Essential hypertension  Assessment and Plan of Treatment: Essential hypertension

## 2020-10-12 NOTE — DISCHARGE NOTE PROVIDER - NSDCMRMEDTOKEN_GEN_ALL_CORE_FT
acetaminophen 325 mg oral tablet: 2 tab(s) orally every 6 hours, As needed, Moderate Pain (4 - 6)  Aldactone 50 mg oral tablet: 1 tab(s) orally once a day  aspirin 81 mg oral delayed release tablet: 1 tab(s) orally once a day  atenolol 100 mg oral tablet: 1 tab(s) orally once a day (in the morning)  Depakote  mg oral tablet, extended release: 2 tab(s) orally once a day (at bedtime)  folic acid 1 mg oral tablet: 1 tab(s) orally once a day  metFORMIN 500 mg oral tablet: 1 tab(s) orally 2 times a  with meals  Multiple Vitamins oral tablet: 1 tab(s) orally once a day  RisperDAL 0.5 mg oral tablet: 1 tab(s) orally once a day (at bedtime)  rivaroxaban 20 mg oral tablet: 1 tab(s) orally once a day (before a meal)  simvastatin 20 mg oral tablet: 1 tab(s) orally once a day (at bedtime)  Zocor 20 mg oral tablet: 1 tab(s) orally once a day (at bedtime)  Zoloft 50 mg oral tablet: 1 tab(s) orally once a day

## 2020-10-12 NOTE — DISCHARGE NOTE NURSING/CASE MANAGEMENT/SOCIAL WORK - PATIENT PORTAL LINK FT
You can access the FollowMyHealth Patient Portal offered by [facility] by registering at the following website: [email or web address]. By joining [employer]’s FollowMyHealth portal, you will also be able to view your health information using other applications (apps) compatible with our system.

## 2020-10-12 NOTE — CONSULT NOTE ADULT - SUBJECTIVE AND OBJECTIVE BOX
Subjective Complaints:  Historian:   patient is a 54 y o woman seen for evaluation because of headache and difficulty expressing herself ,and she returned to herbase line 5 mns later   Consult requested by ER doctor:                  Attending: DR Esteban     HPI:    APARNA SAHA    PAST MEDICAL & SURGICAL HISTORY:  Angina pectoris    TIA (transient ischemic attack)    Anemia    Depression    HTN (hypertension)    H/O total hysterectomy    55yFemale    MEDICATIONS  (STANDING):  aspirin enteric coated 81 milliGRAM(s) Oral daily  ATENolol  Tablet 100 milliGRAM(s) Oral daily  diVALproex ER 1000 milliGRAM(s) Oral at bedtime  folic acid 1 milliGRAM(s) Oral daily  influenza   Vaccine 0.5 milliLiter(s) IntraMuscular once  metFORMIN 500 milliGRAM(s) Oral two times a day  multivitamin 1 Tablet(s) Oral daily  risperiDONE   Tablet 0.5 milliGRAM(s) Oral at bedtime  rivaroxaban 20 milliGRAM(s) Oral with dinner  sertraline 50 milliGRAM(s) Oral daily  simvastatin 20 milliGRAM(s) Oral at bedtime  spironolactone 50 milliGRAM(s) Oral daily    MEDICATIONS  (PRN):      Allergies    Vicodin (Hives)  Vicodin (Rash)    Intolerances      FAMILY HISTORY:  No pertinent family history in first degree relatives        REVIEW OF SYSTEMS:  General:  No wt loss, fevers, chills, night sweats  Eyes:  Good vision, no reported pain  ENT:  No sore throat, pain, runny nose, dysphagia  CV:  No pain, palpitatioins, hypo/hypertension  Resp:  No dyspnea, cough, tachypnea, wheezing  GI:  No pain, nausea, vomiting, diarrhea, constipatiion  :  No pain, bleeding, incontinence, nocturia  Muscle:  No pain, weakness  Breast:  No pain, abscess, mass, discharge  Neuro:  No weakness, tingling, memory problems  Psych:  No fatigue, insomnia, mood problems, depression  Endocrine:  No polyuria, polydypsia, cold/heat intolerance  Heme:  No petechiae, ecchymosis, easy bruisability  Skin:  No rash, tattoos, scars, edema      Vital Signs Last 24 Hrs  T(C): 36.9 (12 Oct 2020 05:26), Max: 37.4 (11 Oct 2020 11:15)  T(F): 98.4 (12 Oct 2020 05:26), Max: 99.3 (11 Oct 2020 11:15)  HR: 69 (12 Oct 2020 05:26) (61 - 69)  BP: 127/63 (12 Oct 2020 05:26) (127/63 - 147/83)  BP(mean): --  RR: 18 (12 Oct 2020 05:26) (18 - 20)  SpO2: 99% (12 Oct 2020 05:26) (99% - 99%)    GENERAL PHYSICAL EXAM:  General:  Appears stated age, well-groomed, well-nourished, no distress  HEENT:  NC/AT, patent nares w/ pink mucosa, OP clear w/o lesions, PERRL, EOMI, conjunctivae clear, no thyromegaly, nodules, adenopathy, no JVD  Chest:  Full & symmetric excursion, no increased effort, breath sounds clear  Cardiovascular:  Regular rhythm, S1, S2, no murmur/rub/S3/S4, no carotid/femoral/abdominal bruit, radial/pedal pulses 2+, no edema  Abdomen:  Soft, non-tender, non-distended, normoactive bowel sounds, no HSM  Extremities:  Gait & station:   Digits:   Nails:   Joints, Bones, Muscles:   ROM:   Stability:  Skin:  No rash/erythema/ecchymoses/petechiae/wounds/abscess/warm/dry  Musculoskeletal:  Full ROM in all joints w/o swelling/tenderness/effusion    NEUROLOGICAL EXAM:  HENT:  Normocephalic head; atraumatic head.  Neck supple.  ENT: normal looking.  Mental State:    Alert.  Fully oriented to person, place and date.  Coherent.  Speech clear and intact.  Cooperative.  Responds appropriately.    Cranial Nerves:  II-XII:   Pupils round and reactive to light and accommodation.  Extraocular movements full.  Visual fields full (no homonymous hemianopsia).  Visual acuity wnl.  Facial symmetry intact.  Tongue midline.  Motor Functions:  Moves all extremities.  No pronator drift of UE.  Claps hand well.  Hand  intact bilaterally.  Ambulatory.    Sensory Functions:   Intact to touch and pinprick to face and extremities.    Reflexes:  Deep tendon reflexes normoactive to biceps, knees and ankles.  Babinski absent (present).  Cerebellar Testing:    Finger to nose intact.  Nystagmus absent.  Neurovascular: Carotid auscultation full without bruits.      LABS:                        10.8   5.52  )-----------( 156      ( 12 Oct 2020 06:58 )             37.1     10-12    143  |  108  |  12  ----------------------------<  111<H>  4.2   |  29  |  0.69    Ca    8.6      12 Oct 2020 06:58  Mg     2.1     10-10    TPro  8.0  /  Alb  3.7  /  TBili  0.2  /  DBili  x   /  AST  18  /  ALT  26  /  AlkPhos  56  10-10    PT/INR - ( 10 Oct 2020 23:08 )   PT: 13.0 sec;   INR: 1.13 ratio         PTT - ( 10 Oct 2020 23:08 )  PTT:31.4 sec        RADIOLOGY & ADDITIONAL STUDIES:    Basic Metabolic Panel: AM Sched. Collection: 12-Oct-2020 05:00 (10-11 @ 15:44)  Complete Blood Count: AM Sched. Collection: 12-Oct-2020 05:00 (10-11 @ 15:44)      Assessment & Opinion:54 y o woman seen for evaluation because of the above symptoms is found to have symptoms and signs of TIA  NIHSS IS ZERO     Recommendations:  Brain MRI.  Carotid doppler.  Echocardiogram.  EEG.   DVT prophylaxis as ordered.  Medications:   ,STATIN DRUG

## 2020-10-12 NOTE — PHYSICAL THERAPY INITIAL EVALUATION ADULT - MD/RN NOTIFIED
Stable diabetes on metformin 500 mg twice daily. She has tolerated medications well without any significant side effects. Renal functions look stable. Lipid panel looks stable.   Continue the same dose of medications and follow-up with labs in about 4-6 yes

## 2020-10-12 NOTE — DISCHARGE NOTE NURSING/CASE MANAGEMENT/SOCIAL WORK - NSDCPEPTSTRK_GEN_ALL_CORE
Need for follow up after discharge/Prescribed medications/Call 911 for stroke/Stroke support groups for patients, families, and friends/Risk factors for stroke/Stroke education booklet/Stroke warning signs and symptoms/Signs and symptoms of stroke

## 2020-10-12 NOTE — DISCHARGE NOTE NURSING/CASE MANAGEMENT/SOCIAL WORK - NSDCPEXARELTO_GEN_ALL_CORE
Rivaroxaban/Xarelto - Compliance/Rivaroxaban/Xarelto - Potential for adverse drug reactions and interactions/Rivaroxaban/Xarelto - Follow up monitoring/Rivaroxaban/Xarelto - Dietary Advice

## 2020-10-12 NOTE — PHYSICAL THERAPY INITIAL EVALUATION ADULT - GENERAL OBSERVATIONS, REHAB EVAL
Pt was seen in supine, c cardiac monitor donned, alert and Ox4. Pt was comfortable and motivated. Sensation, coordination, and visual field are intact. Muscle strength is good throughout.

## 2020-10-16 DIAGNOSIS — G45.9 TRANSIENT CEREBRAL ISCHEMIC ATTACK, UNSPECIFIED: ICD-10-CM

## 2020-10-16 DIAGNOSIS — I10 ESSENTIAL (PRIMARY) HYPERTENSION: ICD-10-CM

## 2020-10-16 DIAGNOSIS — R51.9 HEADACHE, UNSPECIFIED: ICD-10-CM

## 2020-10-16 DIAGNOSIS — Z95.818 PRESENCE OF OTHER CARDIAC IMPLANTS AND GRAFTS: ICD-10-CM

## 2020-10-16 DIAGNOSIS — Z79.02 LONG TERM (CURRENT) USE OF ANTITHROMBOTICS/ANTIPLATELETS: ICD-10-CM

## 2020-10-16 DIAGNOSIS — D64.9 ANEMIA, UNSPECIFIED: ICD-10-CM

## 2020-10-16 DIAGNOSIS — F32.9 MAJOR DEPRESSIVE DISORDER, SINGLE EPISODE, UNSPECIFIED: ICD-10-CM

## 2020-10-16 DIAGNOSIS — Z79.84 LONG TERM (CURRENT) USE OF ORAL HYPOGLYCEMIC DRUGS: ICD-10-CM

## 2020-10-16 DIAGNOSIS — Z88.5 ALLERGY STATUS TO NARCOTIC AGENT: ICD-10-CM

## 2020-11-27 NOTE — ED PROVIDER NOTE - AXIS
[facility]    [facility]              [phone]                          Interval History and plan:      Urine output is 725 mL  Blood pressure is stable    Continue hydrocortisone  30 mg p.o. twice daily. Worsening liver function is contributing to his severe lactic acidosis. Creatinine stable. Blood sugars are improving with changing IV fluid  Bicarbonate has improved. Anion gap has resolved to 13. Assessment :     Severe anion gap metabolic acidosis with lactic acidosis:  Lactate corrected, evaluation is 9.0 with anion gap of 21. He also has mild hyperkalemia with hyponatremia. Blood pressure is stable. Urine output is not accurately measured is about 450 mL. He is afebrile. Acute cholecystitis:  He is on meropenem  Surgery is on the case. CT scan showed gallstones and pericholecystic fluid consistent with acute cholecystitis. Bilateral kidneys are small and simple cyst in the left kidney. Adrenal insufficiency:  He is on hydrocortisone which will be continued. Patient does have history of congestive heart failure with EF of 20 to 25%. [facility] would like to thank Jarrod Low MD   for opportunity to serve this patient      Please call with questions at-   24 Hrs Answering service [phone] or  7 am- 5 pm via Perfect serve or cell phone  [phone]          CC/reason for consult :     Acidosis and hyperkalemia. HPI :     Mariola Sood is a 82 y.o. male presented to   the hospital on 11/22/2020 with abdominal pain. He has past medical history of hypertension, hyperlipidemia and osteoarthritis. He presented with abdominal pain. He also history of Parkinson's disease. His pain is in the right upper quadrant for about 1 week. It is not associated with any nausea and vomiting.   He lives in a nursing facility and was told to come to the hospital.    He is now admitted for acute cholecystitis and is n.p.o. He was on IV fluid which was switched to lactated Ringer's. He is also placed on Zosyn. Surgery is consulted for percutaneous cholecystectomy. He has history of adrenal insufficiency and is on home steroids. I was called for further management of worsening lactic acidosis and hyperkalemia. ROS:     Seen with-student    positives in bold   Constitutional:  fever, chills, weakness, weight change, fatigue  Skin:  rash, pruritus, hair loss, bruising, dry skin, petechiae  Head, Face, Neck   headaches, swelling,  cervical adenopathy  Respiratory: shortness of breath, cough, or wheezing  Cardiovascular: chest pain, palpitations, dizzy, edema  Gastrointestinal: nausea, vomiting, diarrhea, constipation,belly pain    Yellow skin, blood in stool  Musculoskeletal:  back pain, muscle weakness, gait problems,       joint pain or swelling. Genitourinary:  dysuria, poor urine flow, flank pain, blood in urine  Neurologic:  vertigo, TIA'S, syncope, seizures, focal weakness  Psychosocial:  insomnia, anxiety, or depression. All other remaining systems are negative or unable to obtain        PMH/PSH/SH/Family History:     Past Medical History:   Diagnosis Date    Arthritis     Back pain     CAD (coronary artery disease)     Cancer (HCC)     prostate    Diabetes mellitus ([facility] [address]     Hyperlipidemia     Hypertension     Rosacea        Past Surgical History:   Procedure Laterality Date    BACK SURGERY      CATARACT REMOVAL WITH IMPLANT      COLONOSCOPY  9/21/11    COLONOSCOPY  2/11/2015    polyp, diverticulosis    CORONARY ANGIOPLASTY WITH STENT PLACEMENT      CORONARY ARTERY BYPASS GRAFT      EYE SURGERY      JOINT REPLACEMENT      knee    PROSTATE SURGERY      seed implants    SHOULDER SURGERY          reports that he has never smoked. He has never used smokeless tobacco. He reports that he does not drink alcohol or use drugs.     family history includes Cancer in his mother and 40 % oral gel 15 g, 15 g, Oral, PRN  dextrose 50 % IV solution, 12.5 g, Intravenous, PRN  glucagon (rDNA) injection 1 mg, 1 mg, Intramuscular, PRN  dextrose 5 % solution, 100 mL/hr, Intravenous, PRN  hydrocortisone (CORTEF) tablet 30 mg, 30 mg, Oral, Daily       Vitals :     Vitals:    11/27/20 0400   BP: 122/82   Pulse: 78   Resp: 20   Temp: 97.6 °F (36.4 °C)   SpO2: 95%       I & O :       Intake/Output Summary (Last 24 hours) at 11/27/2020 0709  Last data filed at 11/27/2020 0544  Gross per 24 hour   Intake 4564.67 ml   Output 725 ml   Net 3839.67 ml        Physical Examination :     General appearance: He is moaning in pain. HEENT: Lips- normal, teeth- ok , oral mucosa- moist  Neck : Mass- no, appears symmetrical, JVD- not visible  Respiratory: Respiratory effort-okay, wheeze- no, crackles -none  Cardiovascular: Ausculation- No M/R/G, Edema +  Abdomen: visible mass- no, distention- no, scar- no, tenderness-yes in the right upper quadrant.                            hepatosplenomegaly-  no  Musculoskeletal:  clubbing no,cyanosis- no , digital ischemia- no                           muscle strength- grossly normal , tone - grossly normal  Skin: rashes- no , ulcers- no, induration- no, tightening - no  Psychiatric:  Judgement and insight- normal           AAO X 3       LABS:     Recent Labs     11/24/20  1712 11/25/20  0521 11/26/20  0621   WBC 12.3* 9.8 8.7   HGB 11.7* 12.2* 11.8*   HCT 36.4* 37.7* 36.7*   PLT 70* 68* 65*     Recent Labs     11/25/20  0521 11/26/20  0621 11/27/20  0440   * 135* 137   K 5.2* 4.3 4.4   CL 98* 99 103   CO2 21 20* 21   BUN 45* 44* 42*   CREATININE 1.1 1.1 0.9   GLUCOSE 209* 310* 170*   MG 2.10 2.00 2.00   PHOS 3.9 2.2* 2.6      Nephrology  [address] [facility], [address]  Office: [phone]  Fax: [phone] Normal

## 2021-03-04 ENCOUNTER — EMERGENCY (EMERGENCY)
Facility: HOSPITAL | Age: 56
LOS: 0 days | Discharge: HOME | End: 2021-03-04
Payer: COMMERCIAL

## 2021-03-04 VITALS
DIASTOLIC BLOOD PRESSURE: 79 MMHG | OXYGEN SATURATION: 99 % | WEIGHT: 190.04 LBS | HEART RATE: 78 BPM | SYSTOLIC BLOOD PRESSURE: 117 MMHG | HEIGHT: 66 IN | TEMPERATURE: 99 F | RESPIRATION RATE: 17 BRPM

## 2021-03-04 VITALS
OXYGEN SATURATION: 99 % | TEMPERATURE: 98 F | SYSTOLIC BLOOD PRESSURE: 118 MMHG | DIASTOLIC BLOOD PRESSURE: 67 MMHG | RESPIRATION RATE: 16 BRPM

## 2021-03-04 DIAGNOSIS — Z90.710 ACQUIRED ABSENCE OF BOTH CERVIX AND UTERUS: Chronic | ICD-10-CM

## 2021-03-04 PROCEDURE — 73030 X-RAY EXAM OF SHOULDER: CPT | Mod: 26,RT

## 2021-03-04 PROCEDURE — 99284 EMERGENCY DEPT VISIT MOD MDM: CPT

## 2021-03-04 RX ORDER — CYCLOBENZAPRINE HYDROCHLORIDE 10 MG/1
1 TABLET, FILM COATED ORAL
Qty: 9 | Refills: 0
Start: 2021-03-04 | End: 2021-03-06

## 2021-03-04 RX ORDER — CYCLOBENZAPRINE HYDROCHLORIDE 10 MG/1
10 TABLET, FILM COATED ORAL ONCE
Refills: 0 | Status: COMPLETED | OUTPATIENT
Start: 2021-03-04 | End: 2021-03-04

## 2021-03-04 RX ORDER — KETOROLAC TROMETHAMINE 30 MG/ML
30 SYRINGE (ML) INJECTION ONCE
Refills: 0 | Status: DISCONTINUED | OUTPATIENT
Start: 2021-03-04 | End: 2021-03-04

## 2021-03-04 RX ADMIN — Medication 30 MILLIGRAM(S): at 17:01

## 2021-03-04 RX ADMIN — CYCLOBENZAPRINE HYDROCHLORIDE 10 MILLIGRAM(S): 10 TABLET, FILM COATED ORAL at 17:01

## 2021-03-04 NOTE — ED PROVIDER NOTE - CARE PLAN
Principal Discharge DX:	Sprain of right shoulder, unspecified shoulder sprain type, initial encounter

## 2021-03-04 NOTE — ED PROVIDER NOTE - OBJECTIVE STATEMENT
56F history TIA, HTN, HL, DM, presents with right shoulder pain since yesterday, persistent and constant since yesterday, radiating to right arm, worse with movement. Tried Motrin with no relief. No CP or SOB. NO numbness or weakness in the upper extremities. Denies trauma.

## 2021-03-04 NOTE — ED PROVIDER NOTE - PATIENT PORTAL LINK FT
You can access the FollowMyHealth Patient Portal offered by [facility] by registering at the following website: [email or web address]. By joining [employer]’s FollowMyHealth portal, you will also be able to view your health information using other applications (apps) compatible with our system.

## 2021-03-04 NOTE — ED PROVIDER NOTE - CLINICAL SUMMARY MEDICAL DECISION MAKING FREE TEXT BOX
Patient with shoulder pain, atraumatic however reproducible on exam. Unlikely to be cardiac or pulmonary in nature given presentation. Consider muscular strain, rotator cuff injury, recommend xray, nsaids, ortho referral

## 2021-03-04 NOTE — ED ADULT NURSE NOTE - OBJECTIVE STATEMENT
Patient received with complaints of pain to R shoulder that radiates to lower back since yesterday. pt states she woke up with pain, denies any injury to site.

## 2021-03-05 DIAGNOSIS — Z79.84 LONG TERM (CURRENT) USE OF ORAL HYPOGLYCEMIC DRUGS: ICD-10-CM
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
## 2021-03-05 DIAGNOSIS — D64.9 ANEMIA, UNSPECIFIED: ICD-10-CM

## 2021-03-05 DIAGNOSIS — E78.00 PURE HYPERCHOLESTEROLEMIA, UNSPECIFIED: ICD-10-CM

## 2021-03-05 DIAGNOSIS — S43.401A UNSPECIFIED SPRAIN OF RIGHT SHOULDER JOINT, INITIAL ENCOUNTER: ICD-10-CM

## 2021-03-05 DIAGNOSIS — I10 ESSENTIAL (PRIMARY) HYPERTENSION: ICD-10-CM

## 2021-03-05 DIAGNOSIS — M25.511 PAIN IN RIGHT SHOULDER: ICD-10-CM

## 2021-03-05 DIAGNOSIS — F32.9 MAJOR DEPRESSIVE DISORDER, SINGLE EPISODE, UNSPECIFIED: ICD-10-CM

## 2021-03-05 DIAGNOSIS — I20.9 ANGINA PECTORIS, UNSPECIFIED: ICD-10-CM

## 2021-03-05 DIAGNOSIS — Z88.5 ALLERGY STATUS TO NARCOTIC AGENT: ICD-10-CM

## 2021-03-05 DIAGNOSIS — Z20.822 CONTACT WITH AND (SUSPECTED) EXPOSURE TO COVID-19: ICD-10-CM

## 2021-03-05 DIAGNOSIS — Z79.82 LONG TERM (CURRENT) USE OF ASPIRIN: ICD-10-CM

## 2021-03-05 DIAGNOSIS — Z86.73 PERSONAL HISTORY OF TRANSIENT ISCHEMIC ATTACK (TIA), AND CEREBRAL INFARCTION WITHOUT RESIDUAL DEFICITS: ICD-10-CM

## 2021-08-26 ENCOUNTER — NON-APPOINTMENT (OUTPATIENT)
Age: 56
End: 2021-08-26

## 2021-08-26 ENCOUNTER — INPATIENT (INPATIENT)
Facility: HOSPITAL | Age: 56
LOS: 0 days | Discharge: HOME | End: 2021-08-27
Attending: INTERNAL MEDICINE | Admitting: INTERNAL MEDICINE
Payer: COMMERCIAL

## 2021-08-26 VITALS
DIASTOLIC BLOOD PRESSURE: 92 MMHG | TEMPERATURE: 98 F | HEIGHT: 66 IN | RESPIRATION RATE: 18 BRPM | WEIGHT: 184.97 LBS | HEART RATE: 66 BPM | SYSTOLIC BLOOD PRESSURE: 173 MMHG | OXYGEN SATURATION: 97 %

## 2021-08-26 DIAGNOSIS — I10 ESSENTIAL (PRIMARY) HYPERTENSION: ICD-10-CM

## 2021-08-26 DIAGNOSIS — Z29.9 ENCOUNTER FOR PROPHYLACTIC MEASURES, UNSPECIFIED: ICD-10-CM

## 2021-08-26 DIAGNOSIS — I63.9 CEREBRAL INFARCTION, UNSPECIFIED: ICD-10-CM

## 2021-08-26 DIAGNOSIS — Z90.710 ACQUIRED ABSENCE OF BOTH CERVIX AND UTERUS: Chronic | ICD-10-CM

## 2021-08-26 DIAGNOSIS — E11.9 TYPE 2 DIABETES MELLITUS WITHOUT COMPLICATIONS: ICD-10-CM

## 2021-08-26 LAB
ALBUMIN SERPL ELPH-MCNC: 3.3 G/DL — SIGNIFICANT CHANGE UP (ref 3.3–5)
ALP SERPL-CCNC: 50 U/L — SIGNIFICANT CHANGE UP (ref 40–120)
ALT FLD-CCNC: 14 U/L — SIGNIFICANT CHANGE UP (ref 12–78)
ANION GAP SERPL CALC-SCNC: 7 MMOL/L — SIGNIFICANT CHANGE UP (ref 5–17)
APPEARANCE UR: CLEAR — SIGNIFICANT CHANGE UP
APTT BLD: 33.4 SEC — SIGNIFICANT CHANGE UP (ref 27.5–35.5)
AST SERPL-CCNC: 18 U/L — SIGNIFICANT CHANGE UP (ref 15–37)
BASOPHILS # BLD AUTO: 0.02 K/UL — SIGNIFICANT CHANGE UP (ref 0–0.2)
BASOPHILS NFR BLD AUTO: 0.4 % — SIGNIFICANT CHANGE UP (ref 0–2)
BILIRUB SERPL-MCNC: 0.3 MG/DL — SIGNIFICANT CHANGE UP (ref 0.2–1.2)
BILIRUB UR-MCNC: NEGATIVE — SIGNIFICANT CHANGE UP
BUN SERPL-MCNC: 11 MG/DL — SIGNIFICANT CHANGE UP (ref 7–23)
CALCIUM SERPL-MCNC: 8.5 MG/DL — SIGNIFICANT CHANGE UP (ref 8.5–10.1)
CHLORIDE SERPL-SCNC: 106 MMOL/L — SIGNIFICANT CHANGE UP (ref 96–108)
CO2 SERPL-SCNC: 26 MMOL/L — SIGNIFICANT CHANGE UP (ref 22–31)
COLOR SPEC: YELLOW — SIGNIFICANT CHANGE UP
CREAT SERPL-MCNC: 0.45 MG/DL — LOW (ref 0.5–1.3)
DIFF PNL FLD: NEGATIVE — SIGNIFICANT CHANGE UP
EOSINOPHIL # BLD AUTO: 0.08 K/UL — SIGNIFICANT CHANGE UP (ref 0–0.5)
EOSINOPHIL NFR BLD AUTO: 1.7 % — SIGNIFICANT CHANGE UP (ref 0–6)
FLUAV AG NPH QL: SIGNIFICANT CHANGE UP
FLUBV AG NPH QL: SIGNIFICANT CHANGE UP
GLUCOSE BLDC GLUCOMTR-MCNC: 108 MG/DL — HIGH (ref 70–99)
GLUCOSE BLDC GLUCOMTR-MCNC: 86 MG/DL — SIGNIFICANT CHANGE UP (ref 70–99)
GLUCOSE SERPL-MCNC: 89 MG/DL — SIGNIFICANT CHANGE UP (ref 70–99)
GLUCOSE UR QL: NEGATIVE MG/DL — SIGNIFICANT CHANGE UP
HCT VFR BLD CALC: 35.4 % — SIGNIFICANT CHANGE UP (ref 34.5–45)
HGB BLD-MCNC: 10.8 G/DL — LOW (ref 11.5–15.5)
IMM GRANULOCYTES NFR BLD AUTO: 0.4 % — SIGNIFICANT CHANGE UP (ref 0–1.5)
INR BLD: 1.1 RATIO — SIGNIFICANT CHANGE UP (ref 0.88–1.16)
KETONES UR-MCNC: NEGATIVE — SIGNIFICANT CHANGE UP
LEUKOCYTE ESTERASE UR-ACNC: NEGATIVE — SIGNIFICANT CHANGE UP
LYMPHOCYTES # BLD AUTO: 2.32 K/UL — SIGNIFICANT CHANGE UP (ref 1–3.3)
LYMPHOCYTES # BLD AUTO: 48.6 % — HIGH (ref 13–44)
MCHC RBC-ENTMCNC: 22 PG — LOW (ref 27–34)
MCHC RBC-ENTMCNC: 30.5 GM/DL — LOW (ref 32–36)
MCV RBC AUTO: 72.2 FL — LOW (ref 80–100)
MONOCYTES # BLD AUTO: 0.48 K/UL — SIGNIFICANT CHANGE UP (ref 0–0.9)
MONOCYTES NFR BLD AUTO: 10.1 % — SIGNIFICANT CHANGE UP (ref 2–14)
NEUTROPHILS # BLD AUTO: 1.85 K/UL — SIGNIFICANT CHANGE UP (ref 1.8–7.4)
NEUTROPHILS NFR BLD AUTO: 38.8 % — LOW (ref 43–77)
NITRITE UR-MCNC: NEGATIVE — SIGNIFICANT CHANGE UP
NRBC # BLD: 0 /100 WBCS — SIGNIFICANT CHANGE UP (ref 0–0)
PH UR: 7 — SIGNIFICANT CHANGE UP (ref 5–8)
PLATELET # BLD AUTO: 155 K/UL — SIGNIFICANT CHANGE UP (ref 150–400)
POTASSIUM SERPL-MCNC: 3.7 MMOL/L — SIGNIFICANT CHANGE UP (ref 3.5–5.3)
POTASSIUM SERPL-SCNC: 3.7 MMOL/L — SIGNIFICANT CHANGE UP (ref 3.5–5.3)
PROT SERPL-MCNC: 7.2 GM/DL — SIGNIFICANT CHANGE UP (ref 6–8.3)
PROT UR-MCNC: NEGATIVE MG/DL — SIGNIFICANT CHANGE UP
PROTHROM AB SERPL-ACNC: 12.7 SEC — SIGNIFICANT CHANGE UP (ref 10.6–13.6)
RBC # BLD: 4.9 M/UL — SIGNIFICANT CHANGE UP (ref 3.8–5.2)
RBC # FLD: 14.8 % — HIGH (ref 10.3–14.5)
SARS-COV-2 RNA SPEC QL NAA+PROBE: SIGNIFICANT CHANGE UP
SODIUM SERPL-SCNC: 139 MMOL/L — SIGNIFICANT CHANGE UP (ref 135–145)
SP GR SPEC: 1 — LOW (ref 1.01–1.02)
TROPONIN I SERPL-MCNC: <.015 NG/ML — SIGNIFICANT CHANGE UP (ref 0.01–0.04)
UROBILINOGEN FLD QL: NEGATIVE MG/DL — SIGNIFICANT CHANGE UP
WBC # BLD: 4.77 K/UL — SIGNIFICANT CHANGE UP (ref 3.8–10.5)
WBC # FLD AUTO: 4.77 K/UL — SIGNIFICANT CHANGE UP (ref 3.8–10.5)

## 2021-08-26 PROCEDURE — 99291 CRITICAL CARE FIRST HOUR: CPT

## 2021-08-26 PROCEDURE — 70496 CT ANGIOGRAPHY HEAD: CPT | Mod: 26,MA

## 2021-08-26 PROCEDURE — 93010 ELECTROCARDIOGRAM REPORT: CPT

## 2021-08-26 PROCEDURE — 0042T: CPT

## 2021-08-26 PROCEDURE — 70498 CT ANGIOGRAPHY NECK: CPT | Mod: 26,MA

## 2021-08-26 PROCEDURE — 71045 X-RAY EXAM CHEST 1 VIEW: CPT | Mod: 26

## 2021-08-26 PROCEDURE — G0426: CPT | Mod: 95

## 2021-08-26 RX ORDER — INSULIN LISPRO 100/ML
VIAL (ML) SUBCUTANEOUS
Refills: 0 | Status: DISCONTINUED | OUTPATIENT
Start: 2021-08-26 | End: 2021-08-27

## 2021-08-26 RX ORDER — ENOXAPARIN SODIUM 100 MG/ML
40 INJECTION SUBCUTANEOUS DAILY
Refills: 0 | Status: DISCONTINUED | OUTPATIENT
Start: 2021-08-26 | End: 2021-08-26

## 2021-08-26 RX ORDER — DEXTROSE 50 % IN WATER 50 %
15 SYRINGE (ML) INTRAVENOUS ONCE
Refills: 0 | Status: DISCONTINUED | OUTPATIENT
Start: 2021-08-26 | End: 2021-08-27

## 2021-08-26 RX ORDER — SERTRALINE 25 MG/1
50 TABLET, FILM COATED ORAL DAILY
Refills: 0 | Status: DISCONTINUED | OUTPATIENT
Start: 2021-08-26 | End: 2021-08-27

## 2021-08-26 RX ORDER — DEXTROSE 50 % IN WATER 50 %
12.5 SYRINGE (ML) INTRAVENOUS ONCE
Refills: 0 | Status: DISCONTINUED | OUTPATIENT
Start: 2021-08-26 | End: 2021-08-27

## 2021-08-26 RX ORDER — DEXTROSE 50 % IN WATER 50 %
25 SYRINGE (ML) INTRAVENOUS ONCE
Refills: 0 | Status: DISCONTINUED | OUTPATIENT
Start: 2021-08-26 | End: 2021-08-27

## 2021-08-26 RX ORDER — SODIUM CHLORIDE 9 MG/ML
1000 INJECTION, SOLUTION INTRAVENOUS
Refills: 0 | Status: DISCONTINUED | OUTPATIENT
Start: 2021-08-26 | End: 2021-08-27

## 2021-08-26 RX ORDER — FOLIC ACID 0.8 MG
1 TABLET ORAL DAILY
Refills: 0 | Status: DISCONTINUED | OUTPATIENT
Start: 2021-08-26 | End: 2021-08-27

## 2021-08-26 RX ORDER — SIMVASTATIN 20 MG/1
1 TABLET, FILM COATED ORAL
Qty: 0 | Refills: 0 | DISCHARGE

## 2021-08-26 RX ORDER — RISPERIDONE 4 MG/1
0.5 TABLET ORAL AT BEDTIME
Refills: 0 | Status: DISCONTINUED | OUTPATIENT
Start: 2021-08-26 | End: 2021-08-27

## 2021-08-26 RX ORDER — FERROUS SULFATE 325(65) MG
325 TABLET ORAL DAILY
Refills: 0 | Status: DISCONTINUED | OUTPATIENT
Start: 2021-08-26 | End: 2021-08-27

## 2021-08-26 RX ORDER — FONDAPARINUX SODIUM 2.5 MG/.5ML
1 INJECTION, SOLUTION SUBCUTANEOUS
Qty: 0 | Refills: 0 | DISCHARGE

## 2021-08-26 RX ORDER — ASPIRIN/CALCIUM CARB/MAGNESIUM 324 MG
81 TABLET ORAL DAILY
Refills: 0 | Status: DISCONTINUED | OUTPATIENT
Start: 2021-08-26 | End: 2021-08-27

## 2021-08-26 RX ORDER — ATENOLOL 25 MG/1
100 TABLET ORAL DAILY
Refills: 0 | Status: DISCONTINUED | OUTPATIENT
Start: 2021-08-26 | End: 2021-08-27

## 2021-08-26 RX ORDER — RIVAROXABAN 15 MG-20MG
20 KIT ORAL
Refills: 0 | Status: DISCONTINUED | OUTPATIENT
Start: 2021-08-26 | End: 2021-08-27

## 2021-08-26 RX ORDER — INSULIN LISPRO 100/ML
VIAL (ML) SUBCUTANEOUS AT BEDTIME
Refills: 0 | Status: DISCONTINUED | OUTPATIENT
Start: 2021-08-26 | End: 2021-08-27

## 2021-08-26 RX ORDER — SPIRONOLACTONE 25 MG/1
50 TABLET, FILM COATED ORAL DAILY
Refills: 0 | Status: DISCONTINUED | OUTPATIENT
Start: 2021-08-26 | End: 2021-08-27

## 2021-08-26 RX ORDER — DIVALPROEX SODIUM 500 MG/1
1000 TABLET, DELAYED RELEASE ORAL AT BEDTIME
Refills: 0 | Status: DISCONTINUED | OUTPATIENT
Start: 2021-08-26 | End: 2021-08-27

## 2021-08-26 RX ORDER — ATORVASTATIN CALCIUM 80 MG/1
80 TABLET, FILM COATED ORAL AT BEDTIME
Refills: 0 | Status: DISCONTINUED | OUTPATIENT
Start: 2021-08-26 | End: 2021-08-27

## 2021-08-26 RX ORDER — GLUCAGON INJECTION, SOLUTION 0.5 MG/.1ML
1 INJECTION, SOLUTION SUBCUTANEOUS ONCE
Refills: 0 | Status: DISCONTINUED | OUTPATIENT
Start: 2021-08-26 | End: 2021-08-27

## 2021-08-26 RX ADMIN — DIVALPROEX SODIUM 1000 MILLIGRAM(S): 500 TABLET, DELAYED RELEASE ORAL at 23:40

## 2021-08-26 RX ADMIN — ATORVASTATIN CALCIUM 80 MILLIGRAM(S): 80 TABLET, FILM COATED ORAL at 23:41

## 2021-08-26 RX ADMIN — RISPERIDONE 0.5 MILLIGRAM(S): 4 TABLET ORAL at 23:41

## 2021-08-26 RX ADMIN — Medication 81 MILLIGRAM(S): at 19:30

## 2021-08-26 NOTE — H&P ADULT - HISTORY OF PRESENT ILLNESS
57 y/o F w/ PMH of HTN, DM II, RONALDO, chronic back pain, hx of CVA w/ residual right sided weakness and depression presents to ER for lightheadedness and right sided sensation deficits that started today @ 13:30.  Pt explained she felt lightheaded that was then followed by Right facial numbness that extended to arms and legs; Face>>Leg. She states that she took her blood pressure and it was elevated @ 169/96 (normal for her 130/70). She admitted to having Unsteady gait, and blurry vision at the time that has since resolved. She denies any worsening of her chronic right sided weakness, slurred speech, facial drooping, or headache. She denies any CP, SOB, palpitations, fever, cough, N/V/D, dysuria or abdominal pain. After the previous CVA the patient had a loop recorder placed to rule out arrhthymias none was seen. She was started on Xarelto for AC coverage; pt is compliant with her medications.    ER course: Code Stroke called; Tele-neurologist; TPA not recommended. Head CT: no acute changes. EKG: NSR@60bpm, Q wave III, AVF

## 2021-08-26 NOTE — ED PROVIDER NOTE - NS ED MD TWO NIGHTS YN
Was the patient seen in the last year in this department? Yes    Does patient have an active prescription for medications requested? No     Received Request Via: Pharmacy  
Yes

## 2021-08-26 NOTE — CONSULT NOTE ADULT - ASSESSMENT
Transient right sided paresthesias  HTN  HLD  DM2  Bipolar disorder  Chronic low back pain    - MRI brain   - continue Xarelto and Lipitor for possible stroke/TIA prevention  - patient has had numerous similar episodes since ~2000 and has no residual deficits. Unclear if these are TIAs. DDx includes complex migraine vs. anxiety/stress vs. HTN emergency vs. other.  - no need for Xarelto from a neurologic standpoint. Aspirin is sufficient for possible TIA prevention  - consider mental health therapist for better stress/anxiety management    Thank you for the consult.

## 2021-08-26 NOTE — STROKE CODE NOTE - ASSESSMENT/PLAN
56 year old female with past medical history of DM, HLD, TIAs/CVA? (years ago with residual mild right hemiparesis), ILR who presents to ED with sudden onset of stroke symptoms.  She woke up in normal state of health.  1330hrs, she was showering when she had sudden onset of "dizziness" that she does not describe as room spinning, but as "heaviness" as well as right face/arm ama-sensory changes.  She was unable to walk correctly feeling that her balance was off.  She had a CVA years ago with residual mild right hemiparesis (arm/leg) for which she was originally on aspirin/plavix, but cardiology put her on Xarelto for reason not clear at this time.  She is currently on aspirin 81mg and Xarelto 15mg which she last took last night.  NIHSS 5 for right arm and leg drift (chronic), right arm/face sensory changes and right arm/leg ataxia (presumed new).  New stroke symptoms NIH 3.  She is not being given tPA at this time due to nondisabling deficits.  CT head, CTA, CTP personally reviewed.  Negative for acute pathology or large vessel occlusion.  She is not a candidate for mechanical thrombectomy due to no large vessel occlusion and low NIHSS.  Stroke attending, Dr. Richard Libman was present on camera for duration of assessment and helped direct care.    56 year old female with DM, HLD, TIA/CVA on Xarelto with subjective dizziness, right hemisensory changes and right mild ataxia.  Recrudescence vs small vessel ischemia    Temitope Nicholas MD  PGY5  Vascular Neurology Fellow 56 year old female with past medical history of DM, HLD, TIAs/CVA? (years ago with residual mild right hemiparesis), ILR who presents to ED with sudden onset of stroke symptoms.  She woke up in normal state of health.  1330hrs, she was showering when she had sudden onset of "dizziness" that she does not describe as room spinning, but as "heaviness" as well as right face/arm ama-sensory changes.  She was unable to walk correctly feeling that her balance was off.  She had a CVA years ago with residual mild right hemiparesis (arm/leg) for which she was originally on aspirin/plavix, but cardiology put her on Xarelto for reason not clear at this time.  She is currently on aspirin 81mg and Xarelto 15mg which she last took last night.  NIHSS 5 for right arm and leg drift (chronic), right arm/face sensory changes and very mild right arm/leg ataxia (presumed new).  New stroke symptoms NIH 3.  She is not being given tPA at this time due to nondisabling deficits.  CT head, CTA, CTP personally reviewed.  Negative for acute pathology or large vessel occlusion.  She is not a candidate for mechanical thrombectomy due to no large vessel occlusion and low NIHSS.  Stroke attending, Dr. Richard Libman was present on camera for duration of assessment and helped direct care.    56 year old female with DM, HLD, TIA/CVA on Xarelto with subjective dizziness, right hemisensory changes and right mild ataxia.  Recrudescence vs small vessel ischemia    Temitope Nicholas MD  PGY5  Vascular Neurology Fellow    Stroke attending. Agree with above

## 2021-08-26 NOTE — CONSULT NOTE ADULT - SUBJECTIVE AND OBJECTIVE BOX
HPI:   56 year old woman with hx of DM2, HTN, chronic low back pain, bipolar disorder, and multiple TIAs presenting with sudden onset of right face and arm paresthesias/numbness today at 1:30pm. It started when she was got home from work. She found that her blood pressure was very high and it made her nervous. She came to the ED. Patient has had several symptoms of right or left face/arm numbness associated with high blood pressure. It has been going on since ~2000. Sometimes there is a headache associated with it. She denies any weakness or residual deficits from her previous TIAs. As per patient she has been having TIAs at least 2-3 times per year. Her cardiologist put her on Xarelto in 2020 after another one of these episodes. She was seen by TELE-STROKE and no tPA was recommended. Her symptoms have significantly improved in the ED. MRI brain from 9/2020 was unremarkable for any acute or chronic pathology.     PMHx: HTN, HLD, DM2, Bipolar disorder, Numerous transient focal neurologic symptoms since ~2000  PSHx: hysterectomy  FHx: none  Allergies: Vicodin  ROS: headache, paresthesias  Meds: See EMR  Social Hx: non-smoker, no etoh, no illicit drug use; works as nurse    Vitals: Temp    RR 22    HR 70    /83  General: NAD  Neuro Exam: AOx4. Follows commands. No dysarthria. No aphasia. EOM intact. VFF. Tongue is midline. Palate elevates symmetrically. No facial droop. Moving all four extremities. No focal weakness. No drift. Normal sensation. PERRL. Reflexes symmetric and toes down. Gait exam deferred. Finger to nose and heel to shin intact.     NIHSS-0    CT head, CTA head/neck, labs reviewed    MRI brain (9/2020)- unremarkable  MRI brain (10/2014)- unremarkable  MRI brain (12/2011)- unremarkable  MRI brain (12/2010)- unremarkable

## 2021-08-26 NOTE — ED ADULT TRIAGE NOTE - CHIEF COMPLAINT QUOTE
at 130pm, pt started feeling dizzy while taking shower, started feeling unsteady gait, started feeling numbness on right side of face and tongue. hx: tia, htn, dm

## 2021-08-26 NOTE — ED ADULT NURSE REASSESSMENT NOTE - NS ED NURSE REASSESS COMMENT FT1
patient A&Ox3 no complains pass the dysphagia screen , as per Dr Atkinson patient OK to eat pure diet , as per Dr Atkinson he cancel the NPO order cancel the lactate and venose gas test

## 2021-08-26 NOTE — ED PROVIDER NOTE - NS ED ATTENDING STATEMENT MOD
Attending Only 80 I have personally provided the amount of critical care time documented below excluding time spent on separate procedures.

## 2021-08-26 NOTE — ED ADULT NURSE REASSESSMENT NOTE - NS ED NURSE REASSESS COMMENT FT1
patient A&Ox3 in no acute distress no pain or disc at this time ,patient moving all extremities ambulated herself with no assistance, continue to monitor

## 2021-08-26 NOTE — H&P ADULT - NSICDXPASTMEDICALHX_GEN_ALL_CORE_FT
PAST MEDICAL HISTORY:  Anemia     CVA (cerebrovascular accident)     Depression     HTN (hypertension)     TIA (transient ischemic attack)     Type 2 diabetes mellitus without complication, without long-term current use of insulin

## 2021-08-26 NOTE — ED PROVIDER NOTE - OBJECTIVE STATEMENT
56 year old female with PMH of DM II, HTN, HLD, TIA hx presenting to ED due to dizziness symptoms that developed around 1:30pm when she was getting ready for shower with associated R arm and facial numbness that has been 56 year old female with PMH of DM II, HTN, HLD, TIA hx and previous CVA 20 years ago with residual R sided arm/leg weakness that is mild presenting to ED due to dizziness symptoms that developed around 1:30pm when she was getting ready for shower with associated R arm and facial numbness that has been

## 2021-08-26 NOTE — ED ADULT NURSE NOTE - NSIMPLEMENTINTERV_GEN_ALL_ED
Implemented All Fall with Harm Risk Interventions:  [place] to call system. Call bell, personal items and telephone within reach. Instruct patient to call for assistance. Room bathroom lighting operational. Non-slip footwear when patient is off stretcher. Physically safe environment: no spills, clutter or unnecessary equipment. Stretcher in lowest position, wheels locked, appropriate side rails in place. Provide visual cue, wrist band, yellow gown, etc. Monitor gait and stability. Monitor for mental status changes and reorient to person, place, and time. Review medications for side effects contributing to fall risk. Reinforce activity limits and safety measures with patient and family. Provide visual clues: red socks.

## 2021-08-26 NOTE — ED PROVIDER NOTE - CLINICAL SUMMARY MEDICAL DECISION MAKING FREE TEXT BOX
Pt with possible CVA with R arm and leg numbness/sensory deficit but improving. Seen by Telestroke Dr. Nicholas and Dr. Libman and otherwise cleared from code stroke and will admit for further care and CVA workup and Dr. Sullivan notified of consultation    Otherwise currently improving symptoms - may end up having TIA as pt numbness/sensory changes improving during ED evaluation - Will admit to Dr. Padilla for further care.

## 2021-08-26 NOTE — H&P ADULT - TIME BILLING
Lab test review, Radiology Review, Vitals review, Consultant review and discussion, Physical examination,  Assessment and plan; Plan discussion with patient

## 2021-08-26 NOTE — H&P ADULT - PROBLEM SELECTOR PLAN 1
New right sided facial/body sensation deficits. chronic residual Right sided weakness from previous CVA   Head CT: no acute changes.   CTA head: no significant stenosis   Will admit to telemetry under CVA protocol.   f/u Echo, A1c, Lipid profile and PT evaluation

## 2021-08-26 NOTE — PHARMACOTHERAPY INTERVENTION NOTE - COMMENTS
Spoke to MD and recommended to discontinue lovenox with new order of Xarelto for afib. MD agreed to d/c lovenox.

## 2021-08-26 NOTE — H&P ADULT - NSHPPHYSICALEXAM_GEN_ALL_CORE
GENERAL: NAD, well-groomed, well-developed  HEAD:  Atraumatic, Normocephalic  EYES: EOMI, PERRLA, conjunctiva and sclera clear  ENMT:  Moist mucous membranes, Good dentition, No lesions  CHEST/LUNG: Clear to ascultation bilaterally; No rales, rhonchi, wheezing, or rubs  HEART: Regular rate and rhythm; No murmurs, rubs, or gallops  ABDOMEN: Soft, Nontender, Nondistended; Bowel sounds present  EXTREMITIES:  2+ Peripheral Pulses, No clubbing, cyanosis, or edema  SKIN: No rashes or lesions  NERVOUS SYSTEM:  Alert & Oriented X3, Good concentration; Motor Strength 5/5 B/L upper and lower extremities; DTRs 2+ intact and symmetric GENERAL: NAD, well-groomed, well-developed  HEAD:  Atraumatic, Normocephalic  EYES: EOMI, PERRLA, conjunctiva and sclera clear  ENMT:  Moist mucous membranes, Good dentition, No lesions  CHEST/LUNG: Clear to ascultation bilaterally; No rales, rhonchi, wheezing, or rubs  HEART: Regular rate and rhythm; No murmurs, rubs, or gallops  ABDOMEN: Soft, Nontender, Nondistended; Bowel sounds present  EXTREMITIES:  2+ Peripheral Pulses, No clubbing, cyanosis, or edema  SKIN: No rashes or lesions  NERVOUS SYSTEM:  Alert & Oriented X3, Good concentration; +sensation deficits in right entire face and right Upper and lower extremity  Motor Strength 4/5 Right upper and lower extremities, 5/5 Left upper and lower.

## 2021-08-26 NOTE — H&P ADULT - NSHPLABSRESULTS_GEN_ALL_CORE
10.8   4.77  )-----------( 155      ( 26 Aug 2021 16:31 )             35.4         139  |  106  |  11  ----------------------------<  89  3.7   |  26  |  0.45<L>    Ca    8.5      26 Aug 2021 16:31    TPro  7.2  /  Alb  3.3  /  TBili  0.3  /  DBili  x   /  AST  18  /  ALT  14  /  AlkPhos  50      PT/INR - ( 26 Aug 2021 16:31 )   PT: 12.7 sec;   INR: 1.10 ratio         PTT - ( 26 Aug 2021 16:31 )  PTT:33.4 sec  Urinalysis Basic - ( 26 Aug 2021 17:02 )    Color: Yellow / Appearance: Clear / S.005 / pH: x  Gluc: x / Ketone: Negative  / Bili: Negative / Urobili: Negative mg/dL     EKG: NSR @ 60bpm, QIII, AVF     < from: CT Brain Stroke Protocol (21 @ 16:08) >      IMPRESSION:    1)  no CT evidence of an acute infarct or hemorrhage. No space-occupying lesion identified.    < end of copied text >    < from: CT Angio Head w/ IV Cont (21 @ 16:18) >    IMPRESSION:      1. Widely patent head and neck vasculature, as outlined above. No significant stenosis, dissection, or occlusion noted.  2. Small localized diffusion abnormality with hypoperfusion in the right temporal fossa. This may be further assessed and correlated with MR imaging of the brain.  Blood: x / Protein: Negative mg/dL / Nitrite: Negative   Leuk Esterase: Negative / RBC: x / WBC x   Sq Epi: x / Non Sq Epi: x / Bacteria: x

## 2021-08-26 NOTE — ED ADULT NURSE NOTE - OBJECTIVE STATEMENT
patient A&Ox3 in no acute distress this assessment was done when patient arrive, patient A&Ox3 in no acute distress, patient c/o of R side weakness started around 1;30 today , denied chest pain no headache no N/V no dizziness

## 2021-08-27 ENCOUNTER — TRANSCRIPTION ENCOUNTER (OUTPATIENT)
Age: 56
End: 2021-08-27

## 2021-08-27 VITALS
HEART RATE: 61 BPM | DIASTOLIC BLOOD PRESSURE: 86 MMHG | SYSTOLIC BLOOD PRESSURE: 135 MMHG | OXYGEN SATURATION: 99 % | TEMPERATURE: 98 F | RESPIRATION RATE: 18 BRPM

## 2021-08-27 LAB
A1C WITH ESTIMATED AVERAGE GLUCOSE RESULT: 7.2 % — HIGH (ref 4–5.6)
ANION GAP SERPL CALC-SCNC: 5 MMOL/L — SIGNIFICANT CHANGE UP (ref 5–17)
BUN SERPL-MCNC: 9 MG/DL — SIGNIFICANT CHANGE UP (ref 7–23)
CALCIUM SERPL-MCNC: 8.7 MG/DL — SIGNIFICANT CHANGE UP (ref 8.5–10.1)
CHLORIDE SERPL-SCNC: 110 MMOL/L — HIGH (ref 96–108)
CHOLEST SERPL-MCNC: 121 MG/DL — SIGNIFICANT CHANGE UP
CO2 SERPL-SCNC: 28 MMOL/L — SIGNIFICANT CHANGE UP (ref 22–31)
COVID-19 SPIKE DOMAIN AB INTERP: POSITIVE
COVID-19 SPIKE DOMAIN ANTIBODY RESULT: >250 U/ML — HIGH
CREAT SERPL-MCNC: 0.51 MG/DL — SIGNIFICANT CHANGE UP (ref 0.5–1.3)
ESTIMATED AVERAGE GLUCOSE: 160 MG/DL — HIGH (ref 68–114)
GLUCOSE BLDC GLUCOMTR-MCNC: 118 MG/DL — HIGH (ref 70–99)
GLUCOSE BLDC GLUCOMTR-MCNC: 181 MG/DL — HIGH (ref 70–99)
GLUCOSE SERPL-MCNC: 107 MG/DL — HIGH (ref 70–99)
HCT VFR BLD CALC: 36 % — SIGNIFICANT CHANGE UP (ref 34.5–45)
HDLC SERPL-MCNC: 36 MG/DL — LOW
HGB BLD-MCNC: 11 G/DL — LOW (ref 11.5–15.5)
LIPID PNL WITH DIRECT LDL SERPL: 63 MG/DL — SIGNIFICANT CHANGE UP
MCHC RBC-ENTMCNC: 22 PG — LOW (ref 27–34)
MCHC RBC-ENTMCNC: 30.6 GM/DL — LOW (ref 32–36)
MCV RBC AUTO: 72.1 FL — LOW (ref 80–100)
NON HDL CHOLESTEROL: 85 MG/DL — SIGNIFICANT CHANGE UP
NRBC # BLD: 0 /100 WBCS — SIGNIFICANT CHANGE UP (ref 0–0)
PLATELET # BLD AUTO: 176 K/UL — SIGNIFICANT CHANGE UP (ref 150–400)
POTASSIUM SERPL-MCNC: 3.8 MMOL/L — SIGNIFICANT CHANGE UP (ref 3.5–5.3)
POTASSIUM SERPL-SCNC: 3.8 MMOL/L — SIGNIFICANT CHANGE UP (ref 3.5–5.3)
RBC # BLD: 4.99 M/UL — SIGNIFICANT CHANGE UP (ref 3.8–5.2)
RBC # FLD: 14.8 % — HIGH (ref 10.3–14.5)
SARS-COV-2 IGG+IGM SERPL QL IA: >250 U/ML — HIGH
SARS-COV-2 IGG+IGM SERPL QL IA: POSITIVE
SODIUM SERPL-SCNC: 143 MMOL/L — SIGNIFICANT CHANGE UP (ref 135–145)
TRIGL SERPL-MCNC: 111 MG/DL — SIGNIFICANT CHANGE UP
TSH SERPL-MCNC: 1.37 UU/ML — SIGNIFICANT CHANGE UP (ref 0.36–3.74)
VIT B12 SERPL-MCNC: 948 PG/ML — SIGNIFICANT CHANGE UP (ref 232–1245)
WBC # BLD: 4.83 K/UL — SIGNIFICANT CHANGE UP (ref 3.8–10.5)
WBC # FLD AUTO: 4.83 K/UL — SIGNIFICANT CHANGE UP (ref 3.8–10.5)

## 2021-08-27 PROCEDURE — 99239 HOSP IP/OBS DSCHRG MGMT >30: CPT

## 2021-08-27 PROCEDURE — 70551 MRI BRAIN STEM W/O DYE: CPT | Mod: 26

## 2021-08-27 RX ORDER — ATORVASTATIN CALCIUM 80 MG/1
1 TABLET, FILM COATED ORAL
Qty: 30 | Refills: 0
Start: 2021-08-27 | End: 2021-09-25

## 2021-08-27 RX ADMIN — Medication 1 TABLET(S): at 11:46

## 2021-08-27 RX ADMIN — SPIRONOLACTONE 50 MILLIGRAM(S): 25 TABLET, FILM COATED ORAL at 05:12

## 2021-08-27 RX ADMIN — Medication 1 MILLIGRAM(S): at 11:46

## 2021-08-27 RX ADMIN — SERTRALINE 50 MILLIGRAM(S): 25 TABLET, FILM COATED ORAL at 12:47

## 2021-08-27 RX ADMIN — ATENOLOL 100 MILLIGRAM(S): 25 TABLET ORAL at 05:12

## 2021-08-27 RX ADMIN — RIVAROXABAN 20 MILLIGRAM(S): KIT at 00:05

## 2021-08-27 RX ADMIN — Medication 325 MILLIGRAM(S): at 11:46

## 2021-08-27 RX ADMIN — Medication 81 MILLIGRAM(S): at 11:46

## 2021-08-27 NOTE — DISCHARGE NOTE NURSING/CASE MANAGEMENT/SOCIAL WORK - PATIENT PORTAL LINK FT
You can access the FollowMyHealth Patient Portal offered by [facility] [place] by registering at the following website: [email or web address]. By joining [email or web address]’s FollowMyHealth portal, you will also be able to view your health information using other applications (apps) compatible with our system.

## 2021-08-27 NOTE — DISCHARGE NOTE PROVIDER - PROVIDER TOKENS
PROVIDER:[TOKEN:[[id number]]] PROVIDER:[TOKEN:[[id number]]],PROVIDER:[TOKEN:[[id number]]]

## 2021-08-27 NOTE — DISCHARGE NOTE PROVIDER - HOSPITAL COURSE
57 y/o F w/ PMH of HTN, DM II, RONALDO, chronic back pain, hx of CVA w/ residual right sided weakness and depression presents to ER for lightheadedness and right sided sensation deficits that started today @ 13:30.  Pt explained she felt lightheaded that was then followed by Right facial numbness that extended to arms and legs; Face>>Leg. She states that she took her blood pressure and it was elevated @ 169/96 (normal for her 130/70). She admitted to having Unsteady gait, and blurry vision at the time that has since resolved. She denies any worsening of her chronic right sided weakness, slurred speech, facial drooping, or headache. She denies any CP, SOB, palpitations, fever, cough, N/V/D, dysuria or abdominal pain. After the previous CVA the patient had a loop recorder placed to rule out arrhthymias none was seen. She was started on Xarelto for AC coverage; pt is compliant with her medications.    ER course: Code Stroke called; Tele-neurologist; TPA not recommended. Head CT: no acute changes. EKG: NSR@60bpm, Q wave III, AVF       MRI brain   - continue Xarelto and Lipitor for possible stroke/TIA prevention  - patient has had numerous similar episodes since ~2000 and has no residual deficits. Unclear if these are TIAs. DDx includes complex migraine vs. anxiety/stress vs. HTN emergency vs. other.  - no need for Xarelto from a neurologic standpoint. Aspirin is sufficient for possible TIA prevention  - consider mental health therapist for better stress/anxiety management    Thank you for the consult.     - MRI brain pending  - continue Xarelto and Lipitor for possible stroke/TIA prevention  - patient has had numerous similar episodes since ~2000 and has no residual deficits. Unclear if these episodes are TIAs. DDx includes complex migraine vs. anxiety/stress vs. HTN emergency vs. other.  - no need for Xarelto from a neurologic standpoint. Aspirin is sufficient for possible TIA prevention  - consider mental health therapist for better stress/anxiety management  - neuro stable   57 y/o F w/ PMH of HTN, DM II, RONALDO, chronic back pain, hx of CVA w/ residual right sided weakness and depression presents to ER for lightheadedness and right sided sensation deficits that started today @ 13:30.  Pt explained she felt lightheaded that was then followed by Right facial numbness that extended to arms and legs; Face>>Leg. She states that she took her blood pressure and it was elevated @ 169/96 (normal for her 130/70). She admitted to having Unsteady gait, and blurry vision at the time that has since resolved. She denies any worsening of her chronic right sided weakness, slurred speech, facial drooping, or headache. She denies any CP, SOB, palpitations, fever, cough, N/V/D, dysuria or abdominal pain. After the previous CVA the patient had a loop recorder placed to rule out arrhthymias none was seen. She was started on Xarelto for AC coverage; pt is compliant with her medications.    ER course: Code Stroke called; Tele-neurologist; TPA not recommended. Head CT: no acute changes. EKG: NSR@60bpm, Q wave III, AVF      Patient was admitted to tele. Neurology consulted for R sided numbness. Per neuro continue xarelto and lipitor for stroke/TIA prevention. MRI brain head was recommended; pt declined. Patient cleared from neurology standpoint. 55 y/o F w/ PMH of HTN, DM II, RONALDO, chronic back pain, hx of CVA w/ residual right sided weakness and depression presents to ER for lightheadedness and right sided sensation deficits that started today @ 13:30.  Pt explained she felt lightheaded that was then followed by Right facial numbness that extended to arms and legs; Face>>Leg. She states that she took her blood pressure and it was elevated @ 169/96 (normal for her 130/70). She admitted to having Unsteady gait, and blurry vision at the time that has since resolved. She denies any worsening of her chronic right sided weakness, slurred speech, facial drooping, or headache. She denies any CP, SOB, palpitations, fever, cough, N/V/D, dysuria or abdominal pain. After the previous CVA the patient had a loop recorder placed to rule out arrhthymias none was seen. She was started on Xarelto for AC coverage; pt is compliant with her medications.    ER course: Code Stroke called; Tele-neurologist; TPA not recommended. Head CT: no acute changes. EKG: NSR@60bpm, Q wave III, AVF      Patient was admitted to tele. Neurology consulted for R sided numbness. Per neuro continue xarelto and lipitor for stroke/TIA prevention. MRI brain head was done and failed to show any acute findings. The patient is recommended to continue with Aspirin and statin. No clear indication for xarelto at this time. The patient is recommended to follow up with Neurology and her cardiologist.

## 2021-08-27 NOTE — OCCUPATIONAL THERAPY INITIAL EVALUATION ADULT - GENERAL OBSERVATIONS, REHAB EVAL
Pt was seen for initial OT consult, encountered in bed on cardiac monitoring in NAD. Pt was AA&Ox4, cooperative & followed commands. Speech is coherent. Pt moves BUE/ BLE with difficulties & integrates both sides of her body despite residual right side weakness. Pt has good sitting and standing balance, muscle tone is WFL with fair+ coordination in RUE/LE.

## 2021-08-27 NOTE — OCCUPATIONAL THERAPY INITIAL EVALUATION ADULT - BALANCE TRAINING, PT EVAL
Pt will increased standing balance from good to normal in 30 days to prevent falls, optimize pt's ability for ADL management & safely navigate in all terrains

## 2021-08-27 NOTE — DISCHARGE NOTE PROVIDER - CARE PROVIDER_API CALL
Thu Sullivan  NEUROLOGY  [address] [place] [address]  Phone: [phone]  Fax: [phone]  Follow Up Time:    Thu Sullivan  NEUROLOGY  [address] [place] [address]  Phone: [phone]  Fax: [phone]  Follow Up Time:     Zan Lane  INTERNAL MEDICINE  [address], [address]  [place] [address]  Phone: [phone]  Fax: [phone]  Follow Up Time:

## 2021-08-27 NOTE — DISCHARGE NOTE PROVIDER - NSDCQMMRS_NEU_ALL_CORE
0 - No symptoms 2 - Slight disability. Able to lookafter own affairs without assistance, but unable to carry out all previous activities

## 2021-08-27 NOTE — OCCUPATIONAL THERAPY INITIAL EVALUATION ADULT - LIVES WITH, PROFILE
in a private house with 4 step to enter with left ascending handrail. Once inside, pt has to negotiate a flight of stairs  with right ascending handrail to access the bedroom and bathroom. The bathroom has a tub/shower combination, fixed shower head and standard toilet./children in a private house with 4 steps to enter with left ascending handrail. Once inside, pt has to negotiate a flight of stairs  with right ascending handrail to access the bedroom and bathroom. The bathroom has a tub/shower combination, fixed shower head and standard toilet./children

## 2021-08-27 NOTE — OCCUPATIONAL THERAPY INITIAL EVALUATION ADULT - RANGE OF MOTION EXAMINATION, LOWER EXTREMITY
Left LE Active ROM was WNL  (within normal limits)/Left LE Passive ROM was WNL (within normal limits)/Right LE Active ROM was WFL   (within functional limits)/Right LE Passive ROM was WFL  (within functional limits)

## 2021-08-27 NOTE — DISCHARGE NOTE PROVIDER - NSDCMRMEDTOKEN_GEN_ALL_CORE_FT
acetaminophen 325 mg oral tablet: 2 tab(s) orally every 6 hours, As needed, Moderate Pain (4 - 6)  Aldactone 50 mg oral tablet: 1 tab(s) orally once a day  aspirin 81 mg oral delayed release tablet: 1 tab(s) orally once a day  atenolol 100 mg oral tablet: 1 tab(s) orally once a day (in the morning)  Depakote  mg oral tablet, extended release: 2 tab(s) orally once a day (at bedtime)  ferrous sulfate 324 mg (65 mg elemental iron) oral tablet: 1 tab(s) orally once a day  folic acid 1 mg oral tablet: 1 tab(s) orally once a day  metFORMIN 500 mg oral tablet: 1 tab(s) orally 2 times a  with meals  Multiple Vitamins oral tablet: 1 tab(s) orally once a day  RisperDAL 0.5 mg oral tablet: 1 tab(s) orally once a day (at bedtime)  rivaroxaban 20 mg oral tablet: 1 tab(s) orally once a day (before a meal)  simvastatin 20 mg oral tablet: 1 tab(s) orally once a day (at bedtime)  Zoloft 50 mg oral tablet: 1 tab(s) orally once a day   Aldactone 50 mg oral tablet: 1 tab(s) orally once a day  aspirin 81 mg oral delayed release tablet: 1 tab(s) orally once a day  atenolol 100 mg oral tablet: 1 tab(s) orally once a day (in the morning)  atorvastatin 80 mg oral tablet: 1 tab(s) orally once a day (at bedtime)  Depakote  mg oral tablet, extended release: 2 tab(s) orally once a day (at bedtime)  ferrous sulfate 324 mg (65 mg elemental iron) oral tablet: 1 tab(s) orally once a day  folic acid 1 mg oral tablet: 1 tab(s) orally once a day  metFORMIN 500 mg oral tablet: 1 tab(s) orally 2 times a  with meals  Multiple Vitamins oral tablet: 1 tab(s) orally once a day  RisperDAL 0.5 mg oral tablet: 1 tab(s) orally once a day (at bedtime)  rivaroxaban 20 mg oral tablet: 1 tab(s) orally once a day (before a meal)  Zoloft 50 mg oral tablet: 1 tab(s) orally once a day   Aldactone 50 mg oral tablet: 1 tab(s) orally once a day  aspirin 81 mg oral delayed release tablet: 1 tab(s) orally once a day  atenolol 100 mg oral tablet: 1 tab(s) orally once a day (in the morning)  atorvastatin 80 mg oral tablet: 1 tab(s) orally once a day (at bedtime)  Depakote  mg oral tablet, extended release: 2 tab(s) orally once a day (at bedtime)  ferrous sulfate 324 mg (65 mg elemental iron) oral tablet: 1 tab(s) orally once a day  folic acid 1 mg oral tablet: 1 tab(s) orally once a day  metFORMIN 500 mg oral tablet: 1 tab(s) orally 2 times a  with meals  Multiple Vitamins oral tablet: 1 tab(s) orally once a day  RisperDAL 0.5 mg oral tablet: 1 tab(s) orally once a day (at bedtime)  Zoloft 50 mg oral tablet: 1 tab(s) orally once a day

## 2021-08-27 NOTE — DISCHARGE NOTE PROVIDER - NSDCCPCAREPLAN_GEN_ALL_CORE_FT
PRINCIPAL DISCHARGE DIAGNOSIS  Diagnosis: CVA (cerebrovascular accident)  Assessment and Plan of Treatment: Head CT: no acute changes. EKG: NSR@60bpm, Q wave III, AVF   MRI head recommendend to r/o stroke (new/old); patient declined       PRINCIPAL DISCHARGE DIAGNOSIS  Diagnosis: CVA (cerebrovascular accident)  Assessment and Plan of Treatment: Head CT: no acute changes.   EKG: NSR@60bpm, Q wave III, AVF   MRI head No acute changes.   Continue with Aspirin and Atorvastatin   Follow up with Neurology Dr. Sullivan      SECONDARY DISCHARGE DIAGNOSES  Diagnosis: HTN (hypertension)  Assessment and Plan of Treatment: continue with Atenolol, Aldactone   check blood pressure daily and write in Log book   follow up with PCP for mangagement    Problem/Plan - 3:  ·  Problem: DM (diabetes mellitus).   ·  Plan: f/u HgA1c   cont w/ FS monitoring w/ insulin s/s covera     PRINCIPAL DISCHARGE DIAGNOSIS  Diagnosis: Brain TIA  Assessment and Plan of Treatment: Hx of CVA with residual Right sided weakness   Head CT: no acute changes.   EKG: NSR@60bpm, Q wave III, AVF   MRI head No acute changes.   Continue with Aspirin and Atorvastatin   Follow up with Neurology Dr. Sullivan      SECONDARY DISCHARGE DIAGNOSES  Diagnosis: HTN (hypertension)  Assessment and Plan of Treatment: continue with Atenolol, Aldactone   check blood pressure daily and write in Log book   follow up with PCP for mangagement    Diagnosis: DM (diabetes mellitus)  Assessment and Plan of Treatment: HgA1c: 7.2%  continue with Metformin   continue with glucose monitoring with insulin s/s coverage    Diagnosis: Depression  Assessment and Plan of Treatment: continue with home medications   follow up with PCP    Diagnosis: Brain TIA  Assessment and Plan of Treatment:

## 2021-08-27 NOTE — OCCUPATIONAL THERAPY INITIAL EVALUATION ADULT - RANGE OF MOTION EXAMINATION, UPPER EXTREMITY
Left UE Active ROM was WNL (within normal limits)/Left UE Passive ROM was WNL (within normal limits)/Right UE Active ROM was WFL (within functional limits)/Right UE Passive ROM was WFL  (within functional limits)

## 2021-08-27 NOTE — OCCUPATIONAL THERAPY INITIAL EVALUATION ADULT - PLANNED THERAPY INTERVENTIONS, OT EVAL
energy conservation techniques/IADL retraining/balance training/parent/caregiver training.../strengthening

## 2021-08-27 NOTE — OCCUPATIONAL THERAPY INITIAL EVALUATION ADULT - SOCIAL CONCERNS
Pt voiced concerns about her recovery and current DX of CVA/Complex psychosocial needs/coping issues

## 2021-08-27 NOTE — OCCUPATIONAL THERAPY INITIAL EVALUATION ADULT - ADDITIONAL COMMENTS
Prior to admission, pt was functioning in her roles, self sufficient & ambulating independently without any assistive devices. Prior to admission, pt was functioning in her roles, self sufficient & ambulating independently without any assistive devices. Pt is still capable of managing ADL tasks and ambulate without any adaptive device. Dexterity and fine motor skills are mildly diminished due to residual weakness from old CVA. Pt is right hand dominant and wears glasses for reading. Prior to admission, pt was functioning in her roles, self sufficient & ambulating independently without any assistive devices. Pt is still capable of managing ADL tasks and ambulate at panfilo without any adaptive device. Dexterity and fine motor skills are mildly diminished due to residual weakness from old CVA. Pt is right hand dominant and wears glasses for reading.

## 2021-08-27 NOTE — OCCUPATIONAL THERAPY INITIAL EVALUATION ADULT - STRENGTHENING, PT EVAL
Pt will increased RUE/LE muscle strength by 1 full grade to maximize independence with functional mobility and self care tasks within 30days

## 2021-08-27 NOTE — DISCHARGE NOTE NURSING/CASE MANAGEMENT/SOCIAL WORK - NSDCPEFALRISK_GEN_ALL_CORE
For information on Fall & injury Prevention, visit [email or web address]

## 2021-08-27 NOTE — OCCUPATIONAL THERAPY INITIAL EVALUATION ADULT - PERTINENT HX OF CURRENT PROBLEM, REHAB EVAL
Pt is 55 y/o presented to ER due to acute onset of neurological deficits. Pt is diagnosed with CVA. MRI is pending , Head CT on 8/26/21 results  identified in the left basal ganglia region,

## 2021-08-27 NOTE — DISCHARGE NOTE NURSING/CASE MANAGEMENT/SOCIAL WORK - NSDCFUADDAPPT_GEN_ALL_CORE_FT
It is important to see your primary physician as well as the physicians noted below within the next week to perform a comprehensive medical review.  Call their offices for an appointment as soon as you leave the hospital.  You will also need to see them for renewal of your medications.  If you do not have a primary physician, contact the [facility] Physician Referral Service at [phone].  To obtain your results, you can access the [employer] Patient Portal at [email or web address].  Your medical issues appear to be stable at this time, but if your symptoms recur or worsen, contact your physicians and/or return to the hospital if necessary.  If you encounter any issues or questions with your medication, call your physicians before stopping the medication.

## 2021-08-27 NOTE — PROGRESS NOTE ADULT - SUBJECTIVE AND OBJECTIVE BOX
Neurology Progress Note    No acute events.    Neuro Exam: AOx4. Follows commands. No dysarthria. No aphasia. EOM intact. PERRL. Moving all four extremities.     MRI brain- pending    A/P:  Transient right sided paresthesias  HTN  HLD  DM2  Bipolar disorder  Chronic low back pain    - MRI brain pending  - continue Xarelto and Lipitor for possible stroke/TIA prevention  - patient has had numerous similar episodes since ~2000 and has no residual deficits. Unclear if these episodes are TIAs. DDx includes complex migraine vs. anxiety/stress vs. HTN emergency vs. other.  - no need for Xarelto from a neurologic standpoint. Aspirin is sufficient for possible TIA prevention  - consider mental health therapist for better stress/anxiety management  - neuro stable

## 2021-08-27 NOTE — DISCHARGE NOTE PROVIDER - NSDCFUADDAPPT_GEN_ALL_CORE_FT
It is important to see your primary physician as well as the physicians noted below within the next week to perform a comprehensive medical review.  Call their offices for an appointment as soon as you leave the hospital.  You will also need to see them for renewal of your medications.  If you do not have a primary physician, contact the [facility] Physician Referral Service at [phone].  To obtain your results, you can access the [employer] Patient Portal at [email or web address].  Your medical issues appear to be stable at this time, but if your symptoms recur or worsen, contact your physicians and/or return to the hospital if necessary.  If you encounter any issues or questions with your medication, call your physicians before stopping the medication.

## 2021-08-31 DIAGNOSIS — Z86.73 PERSONAL HISTORY OF TRANSIENT ISCHEMIC ATTACK (TIA), AND CEREBRAL INFARCTION WITHOUT RESIDUAL DEFICITS: ICD-10-CM

## 2021-08-31 DIAGNOSIS — I69.951 HEMIPLEGIA AND HEMIPARESIS FOLLOWING UNSPECIFIED CEREBROVASCULAR DISEASE AFFECTING RIGHT DOMINANT SIDE: ICD-10-CM

## 2021-08-31 DIAGNOSIS — I63.9 CEREBRAL INFARCTION, UNSPECIFIED: ICD-10-CM

## 2021-08-31 DIAGNOSIS — R20.2 PARESTHESIA OF SKIN: ICD-10-CM

## 2021-08-31 DIAGNOSIS — I10 ESSENTIAL (PRIMARY) HYPERTENSION: ICD-10-CM

## 2021-08-31 DIAGNOSIS — D50.9 IRON DEFICIENCY ANEMIA, UNSPECIFIED: ICD-10-CM

## 2021-08-31 DIAGNOSIS — M54.5 LOW BACK PAIN: ICD-10-CM

## 2021-08-31 DIAGNOSIS — G45.9 TRANSIENT CEREBRAL ISCHEMIC ATTACK, UNSPECIFIED: ICD-10-CM

## 2021-08-31 DIAGNOSIS — F32.9 MAJOR DEPRESSIVE DISORDER, SINGLE EPISODE, UNSPECIFIED: ICD-10-CM

## 2021-08-31 DIAGNOSIS — E78.5 HYPERLIPIDEMIA, UNSPECIFIED: ICD-10-CM

## 2021-08-31 DIAGNOSIS — G89.29 OTHER CHRONIC PAIN: ICD-10-CM

## 2021-08-31 DIAGNOSIS — F31.9 BIPOLAR DISORDER, UNSPECIFIED: ICD-10-CM

## 2021-08-31 DIAGNOSIS — E11.9 TYPE 2 DIABETES MELLITUS WITHOUT COMPLICATIONS: ICD-10-CM

## 2021-09-01 ENCOUNTER — OUTPATIENT (OUTPATIENT)
Dept: OUTPATIENT SERVICES | Facility: HOSPITAL | Age: 56
LOS: 1 days | End: 2021-09-01
Payer: COMMERCIAL

## 2021-09-01 VITALS
HEART RATE: 68 BPM | DIASTOLIC BLOOD PRESSURE: 65 MMHG | RESPIRATION RATE: 18 BRPM | OXYGEN SATURATION: 100 % | SYSTOLIC BLOOD PRESSURE: 138 MMHG | WEIGHT: 186.07 LBS

## 2021-09-01 VITALS
HEART RATE: 70 BPM | DIASTOLIC BLOOD PRESSURE: 77 MMHG | SYSTOLIC BLOOD PRESSURE: 138 MMHG | RESPIRATION RATE: 18 BRPM | OXYGEN SATURATION: 98 %

## 2021-09-01 DIAGNOSIS — Z45.010 ENCOUNTER FOR CHECKING AND TESTING OF CARDIAC PACEMAKER PULSE GENERATOR [BATTERY]: ICD-10-CM

## 2021-09-01 DIAGNOSIS — Z90.710 ACQUIRED ABSENCE OF BOTH CERVIX AND UTERUS: Chronic | ICD-10-CM

## 2021-09-01 DIAGNOSIS — I63.9 CEREBRAL INFARCTION, UNSPECIFIED: ICD-10-CM

## 2021-09-01 PROCEDURE — 93010 ELECTROCARDIOGRAM REPORT: CPT

## 2021-09-01 PROCEDURE — 93005 ELECTROCARDIOGRAM TRACING: CPT

## 2021-09-01 RX ORDER — METFORMIN HYDROCHLORIDE 850 MG/1
1 TABLET ORAL
Qty: 0 | Refills: 0 | DISCHARGE

## 2021-09-01 RX ORDER — ATENOLOL 25 MG/1
1 TABLET ORAL
Qty: 0 | Refills: 0 | DISCHARGE

## 2021-09-01 NOTE — ASU DISCHARGE PLAN (ADULT/PEDIATRIC) - CARE PROVIDER_API CALL
Zan Lane  INTERNAL MEDICINE  [address], [address]  [place] [address]  Phone: [phone]  Fax: [phone]  Follow Up Time:

## 2021-09-01 NOTE — ASU DISCHARGE PLAN (ADULT/PEDIATRIC) - ASU DC SPECIAL INSTRUCTIONSFT
WOUND CARE:  Do NOT scrub, rub, or pick at your incision site     AFTER 1 days you may shower:   - use mild soap and gentle warm stream, pat dry with towel. DO NOT apply lotions, powders, ointment, or perfumes to incision wear loose clothing around incision for 7 days.   f/u appointment as instructed     ACTIVITY:   resume normal activities    Follow heart healthy diet recommended by your doctor, , if you smoke STOP SMOKING ( may call [phone] for center of tobacco control if you need assistance)     ***CALL YOUR DOCTOR***  if you experience: fever, chills, body aches, or severe pain, swelling, redness, heat or yellow discharge at incision site  If you are unable to reach your doctor, you may contact Cardiology Office at [facility] at [phone]

## 2021-09-02 PROBLEM — E11.9 TYPE 2 DIABETES MELLITUS WITHOUT COMPLICATIONS: Chronic | Status: ACTIVE | Noted: 2021-08-26

## 2021-09-02 PROBLEM — I63.9 CEREBRAL INFARCTION, UNSPECIFIED: Chronic | Status: ACTIVE | Noted: 2021-08-26

## 2021-09-14 ENCOUNTER — RESULT CHARGE (OUTPATIENT)
Age: 56
End: 2021-09-14

## 2021-09-15 ENCOUNTER — APPOINTMENT (OUTPATIENT)
Dept: ELECTROPHYSIOLOGY | Facility: CLINIC | Age: 56
End: 2021-09-15
Payer: COMMERCIAL

## 2021-09-15 ENCOUNTER — NON-APPOINTMENT (OUTPATIENT)
Age: 56
End: 2021-09-15

## 2021-09-15 VITALS
HEIGHT: 66 IN | HEART RATE: 84 BPM | DIASTOLIC BLOOD PRESSURE: 83 MMHG | WEIGHT: 185 LBS | SYSTOLIC BLOOD PRESSURE: 129 MMHG | BODY MASS INDEX: 29.73 KG/M2

## 2021-09-15 DIAGNOSIS — Z45.09 ENCOUNTER FOR ADJUSTMENT AND MANAGEMENT OF OTHER CARDIAC DEVICE: ICD-10-CM

## 2021-09-15 DIAGNOSIS — Z86.73 PERSONAL HISTORY OF TRANSIENT ISCHEMIC ATTACK (TIA), AND CEREBRAL INFARCTION W/OUT RESIDUAL DEFICITS: ICD-10-CM

## 2021-09-15 DIAGNOSIS — Z86.59 PERSONAL HISTORY OF OTHER MENTAL AND BEHAVIORAL DISORDERS: ICD-10-CM

## 2021-09-15 DIAGNOSIS — Z86.39 PERSONAL HISTORY OF OTHER ENDOCRINE, NUTRITIONAL AND METABOLIC DISEASE: ICD-10-CM

## 2021-09-15 PROCEDURE — 99024 POSTOP FOLLOW-UP VISIT: CPT

## 2021-09-15 PROCEDURE — 93000 ELECTROCARDIOGRAM COMPLETE: CPT

## 2021-09-15 RX ORDER — FOLIC ACID 1 MG/1
1 TABLET ORAL
Refills: 0 | Status: ACTIVE | COMMUNITY

## 2021-09-15 RX ORDER — NIFEDIPINE 30 MG/1
30 TABLET, EXTENDED RELEASE ORAL
Refills: 0 | Status: ACTIVE | COMMUNITY

## 2021-09-15 RX ORDER — SPIRONOLACTONE 50 MG/1
50 TABLET, FILM COATED ORAL
Refills: 0 | Status: ACTIVE | COMMUNITY

## 2021-09-15 RX ORDER — SERTRALINE HYDROCHLORIDE 50 MG/1
50 TABLET, FILM COATED ORAL
Refills: 0 | Status: ACTIVE | COMMUNITY

## 2021-09-15 RX ORDER — DIVALPROEX SODIUM 500 MG/1
500 TABLET, DELAYED RELEASE ORAL DAILY
Refills: 0 | Status: ACTIVE | COMMUNITY

## 2021-09-15 RX ORDER — RIVAROXABAN 15 MG/1
15 TABLET, FILM COATED ORAL
Refills: 0 | Status: ACTIVE | COMMUNITY

## 2021-09-15 RX ORDER — RISPERIDONE 0.5 MG/1
0.5 TABLET ORAL
Refills: 0 | Status: ACTIVE | COMMUNITY

## 2021-09-15 RX ORDER — ATENOLOL 50 MG/1
50 TABLET ORAL
Refills: 0 | Status: ACTIVE | COMMUNITY

## 2021-09-15 NOTE — PHYSICAL EXAM
[Normal] : no rash, no skin lesions [de-identified] : Loop explant wound healing good; no erythema, swelling or drainage.

## 2021-09-15 NOTE — ASSESSMENT
[FreeTextEntry1] : This is 56 yr old F with hx of TIA, s/p Loop explant on 9/1/2021. Wound healing good.

## 2021-09-15 NOTE — HISTORY OF PRESENT ILLNESS
[FreeTextEntry1] : Ms. Priscila Buckner is a 56 yrs old F with hx of HTN, Depression, DM, RONALDO, TIA s/p loop recorder implant by Dr. Lane for continuous arrhythmia monitoring.

## 2022-08-18 NOTE — ED PROVIDER NOTE - ATTESTATION, MLM
done
I have reviewed and confirmed nurses' notes for patient's medications, allergies, medical history, and surgical history.

## 2022-10-25 ENCOUNTER — OFFICE (OUTPATIENT)
Dept: URBAN - METROPOLITAN AREA CLINIC 109 | Facility: CLINIC | Age: 57
Setting detail: OPHTHALMOLOGY
End: 2022-10-25
Payer: COMMERCIAL

## 2022-10-25 DIAGNOSIS — H40.023: ICD-10-CM

## 2022-10-25 DIAGNOSIS — H16.221: ICD-10-CM

## 2022-10-25 PROCEDURE — 92002 INTRM OPH EXAM NEW PATIENT: CPT | Performed by: OPHTHALMOLOGY

## 2022-10-25 PROCEDURE — 92020 GONIOSCOPY: CPT | Performed by: OPHTHALMOLOGY

## 2022-10-25 PROCEDURE — 76514 ECHO EXAM OF EYE THICKNESS: CPT | Performed by: OPHTHALMOLOGY

## 2022-10-25 ASSESSMENT — REFRACTION_MANIFEST
OD_SPHERE: +1.25
OS_VA1: 20/20-
OS_SPHERE: +1.25
OD_VA1: 20/20-

## 2022-10-25 ASSESSMENT — LID EXAM ASSESSMENTS
OD_MEIBOMITIS: RLL RUL 1+ 2+
OS_MEIBOMITIS: LLL LUL 1+ 2+

## 2022-10-25 ASSESSMENT — REFRACTION_CURRENTRX
OS_CYLINDER: -0.50
OD_SPHERE: +2.25
OS_OVR_VA: 20/
OS_AXIS: 79
OD_OVR_VA: 20/
OS_SPHERE: +2.25

## 2022-10-25 ASSESSMENT — PACHYMETRY
OD_CT_CORRECTION: 3
OS_CT_UM: 507
OD_CT_UM: 501
OS_CT_CORRECTION: 3

## 2022-10-25 ASSESSMENT — SPHEQUIV_DERIVED: OS_SPHEQUIV: 1.25

## 2022-10-25 ASSESSMENT — REFRACTION_AUTOREFRACTION
OS_AXIS: 87
OS_SPHERE: +1.50
OS_CYLINDER: -0.50
OD_SPHERE: +1.25

## 2022-10-25 ASSESSMENT — CONFRONTATIONAL VISUAL FIELD TEST (CVF)
OS_FINDINGS: FULL
OD_FINDINGS: FULL

## 2022-10-25 ASSESSMENT — VISUAL ACUITY
OS_BCVA: 20/40
OD_BCVA: 20/40-2

## 2022-11-09 ENCOUNTER — OFFICE (OUTPATIENT)
Dept: URBAN - METROPOLITAN AREA CLINIC 109 | Facility: CLINIC | Age: 57
Setting detail: OPHTHALMOLOGY
End: 2022-11-09
Payer: COMMERCIAL

## 2022-11-09 DIAGNOSIS — E11.9: ICD-10-CM

## 2022-11-09 DIAGNOSIS — H40.023: ICD-10-CM

## 2022-11-09 PROBLEM — H16.221: Status: ACTIVE | Noted: 2022-10-25

## 2022-11-09 PROCEDURE — 92083 EXTENDED VISUAL FIELD XM: CPT | Performed by: OPHTHALMOLOGY

## 2022-11-09 PROCEDURE — 92014 COMPRE OPH EXAM EST PT 1/>: CPT | Performed by: OPHTHALMOLOGY

## 2022-11-09 PROCEDURE — 92133 CPTRZD OPH DX IMG PST SGM ON: CPT | Performed by: OPHTHALMOLOGY

## 2022-11-09 ASSESSMENT — REFRACTION_AUTOREFRACTION
OS_SPHERE: +1.50
OD_SPHERE: +1.25
OS_AXIS: 87
OS_CYLINDER: -0.50

## 2022-11-09 ASSESSMENT — REFRACTION_CURRENTRX
OS_OVR_VA: 20/
OD_SPHERE: +2.25
OS_CYLINDER: -0.50
OS_AXIS: 79
OD_OVR_VA: 20/
OS_SPHERE: +2.25

## 2022-11-09 ASSESSMENT — PACHYMETRY
OS_CT_UM: 507
OD_CT_CORRECTION: 3
OD_CT_UM: 501
OS_CT_CORRECTION: 3

## 2022-11-09 ASSESSMENT — LID EXAM ASSESSMENTS
OS_MEIBOMITIS: LLL LUL 1+ 2+
OD_MEIBOMITIS: RLL RUL 1+ 2+

## 2022-11-09 ASSESSMENT — SPHEQUIV_DERIVED
OS_SPHEQUIV: 2.5
OS_SPHEQUIV: 1.25

## 2022-11-09 ASSESSMENT — REFRACTION_MANIFEST
OS_CYLINDER: -0.50
OS_AXIS: 85
OS_SPHERE: +1.25
OD_SPHERE: +1.25
OD_SPHERE: +2.75
OS_SPHERE: +2.75
OD_VA1: 20/20-
OS_VA1: 20/20-

## 2022-11-09 ASSESSMENT — VISUAL ACUITY
OS_BCVA: 20/40-1
OD_BCVA: 20/40-2

## 2022-12-12 ENCOUNTER — OFFICE (OUTPATIENT)
Dept: URBAN - METROPOLITAN AREA CLINIC 109 | Facility: CLINIC | Age: 57
Setting detail: OPHTHALMOLOGY
End: 2022-12-12
Payer: COMMERCIAL

## 2022-12-12 ENCOUNTER — RX ONLY (RX ONLY)
Age: 57
End: 2022-12-12

## 2022-12-12 DIAGNOSIS — H40.023: ICD-10-CM

## 2022-12-12 PROBLEM — E11.9 DIABETES TYPE 2 NO RETINOPATHY: Status: ACTIVE | Noted: 2022-11-09

## 2022-12-12 PROCEDURE — 99212 OFFICE O/P EST SF 10 MIN: CPT | Performed by: OPHTHALMOLOGY

## 2022-12-12 ASSESSMENT — REFRACTION_AUTOREFRACTION
OS_AXIS: 87
OS_SPHERE: +1.50
OS_CYLINDER: -0.50
OD_SPHERE: +1.25

## 2022-12-12 ASSESSMENT — REFRACTION_CURRENTRX
OD_SPHERE: +2.25
OS_SPHERE: +2.25
OS_OVR_VA: 20/
OS_AXIS: 79
OD_OVR_VA: 20/
OS_CYLINDER: -0.50

## 2022-12-12 ASSESSMENT — REFRACTION_MANIFEST
OS_SPHERE: +1.25
OS_CYLINDER: -0.50
OS_VA1: 20/20-
OD_ADD: +2.00
OS_AXIS: 85
OS_ADD: +2.00
OD_VA1: 20/20-
OS_SPHERE: +2.75
OD_SPHERE: +2.75
OD_SPHERE: +1.25

## 2022-12-12 ASSESSMENT — LID EXAM ASSESSMENTS
OS_MEIBOMITIS: LLL LUL 1+ 2+
OD_MEIBOMITIS: RLL RUL 1+ 2+

## 2022-12-12 ASSESSMENT — VISUAL ACUITY
OD_BCVA: 20/25-1
OS_BCVA: 20/30-1

## 2022-12-12 ASSESSMENT — SPHEQUIV_DERIVED
OS_SPHEQUIV: 1.25
OS_SPHEQUIV: 2.5

## 2022-12-12 ASSESSMENT — PACHYMETRY
OD_CT_UM: 501
OD_CT_CORRECTION: 3
OS_CT_UM: 507
OS_CT_CORRECTION: 3

## 2023-04-18 ENCOUNTER — OFFICE (OUTPATIENT)
Dept: URBAN - METROPOLITAN AREA CLINIC 109 | Facility: CLINIC | Age: 58
Setting detail: OPHTHALMOLOGY
End: 2023-04-18
Payer: COMMERCIAL

## 2023-04-18 DIAGNOSIS — H40.023: ICD-10-CM

## 2023-04-18 PROCEDURE — 99213 OFFICE O/P EST LOW 20 MIN: CPT | Performed by: OPHTHALMOLOGY

## 2023-04-18 ASSESSMENT — REFRACTION_MANIFEST
OD_VA1: 20/20-
OS_AXIS: 85
OS_ADD: +2.00
OD_ADD: +2.00
OS_VA1: 20/20-
OD_SPHERE: +2.75
OS_CYLINDER: -0.50
OD_SPHERE: +1.25
OS_SPHERE: +2.75
OS_SPHERE: +1.25

## 2023-04-18 ASSESSMENT — VISUAL ACUITY
OS_BCVA: 20/40-2
OD_BCVA: 20/40-1

## 2023-04-18 ASSESSMENT — PACHYMETRY
OD_CT_UM: 501
OS_CT_UM: 507
OS_CT_CORRECTION: 3
OD_CT_CORRECTION: 3

## 2023-04-18 ASSESSMENT — CONFRONTATIONAL VISUAL FIELD TEST (CVF)
OS_FINDINGS: FULL
OD_FINDINGS: FULL

## 2023-04-18 ASSESSMENT — REFRACTION_CURRENTRX
OS_SPHERE: +2.25
OS_AXIS: 79
OS_CYLINDER: -0.50
OS_OVR_VA: 20/
OD_OVR_VA: 20/
OD_SPHERE: +2.25

## 2023-04-18 ASSESSMENT — REFRACTION_AUTOREFRACTION
OS_SPHERE: +1.50
OD_SPHERE: +1.25
OS_AXIS: 87
OS_CYLINDER: -0.50

## 2023-04-18 ASSESSMENT — LID EXAM ASSESSMENTS
OD_MEIBOMITIS: RLL RUL 1+ 2+
OS_MEIBOMITIS: LLL LUL 1+ 2+

## 2023-04-18 ASSESSMENT — SPHEQUIV_DERIVED
OS_SPHEQUIV: 2.5
OS_SPHEQUIV: 1.25

## 2023-04-18 ASSESSMENT — TONOMETRY
OD_IOP_MMHG: 18
OS_IOP_MMHG: 18

## 2023-07-17 ENCOUNTER — OFFICE (OUTPATIENT)
Dept: URBAN - METROPOLITAN AREA CLINIC 109 | Facility: CLINIC | Age: 58
Setting detail: OPHTHALMOLOGY
End: 2023-07-17
Payer: COMMERCIAL

## 2023-07-17 DIAGNOSIS — H40.023: ICD-10-CM

## 2023-07-17 PROCEDURE — 92133 CPTRZD OPH DX IMG PST SGM ON: CPT | Performed by: OPHTHALMOLOGY

## 2023-07-17 PROCEDURE — 92012 INTRM OPH EXAM EST PATIENT: CPT | Performed by: OPHTHALMOLOGY

## 2023-07-17 PROCEDURE — 92083 EXTENDED VISUAL FIELD XM: CPT | Performed by: OPHTHALMOLOGY

## 2023-07-17 ASSESSMENT — PACHYMETRY
OD_CT_CORRECTION: 3
OS_CT_CORRECTION: 3
OS_CT_UM: 507
OD_CT_UM: 501

## 2023-07-17 ASSESSMENT — REFRACTION_CURRENTRX
OS_SPHERE: +2.25
OD_OVR_VA: 20/
OS_OVR_VA: 20/
OS_AXIS: 79
OS_CYLINDER: -0.50
OD_SPHERE: +2.25

## 2023-07-17 ASSESSMENT — REFRACTION_MANIFEST
OD_VA1: 20/20-
OS_AXIS: 85
OS_SPHERE: +2.75
OD_SPHERE: +1.25
OS_SPHERE: +1.25
OS_VA1: 20/20-
OS_CYLINDER: -0.50
OS_ADD: +2.00
OD_ADD: +2.00
OD_SPHERE: +2.75

## 2023-07-17 ASSESSMENT — TONOMETRY
OS_IOP_MMHG: 20
OD_IOP_MMHG: 20

## 2023-07-17 ASSESSMENT — CONFRONTATIONAL VISUAL FIELD TEST (CVF)
OD_FINDINGS: FULL
OS_FINDINGS: FULL

## 2023-07-17 ASSESSMENT — REFRACTION_AUTOREFRACTION
OS_CYLINDER: -0.50
OD_SPHERE: +1.25
OS_AXIS: 87
OS_SPHERE: +1.50

## 2023-07-17 ASSESSMENT — SPHEQUIV_DERIVED
OS_SPHEQUIV: 2.5
OS_SPHEQUIV: 1.25

## 2023-07-17 ASSESSMENT — VISUAL ACUITY
OD_BCVA: 20/30-1
OS_BCVA: 20/40+2

## 2023-07-17 ASSESSMENT — LID EXAM ASSESSMENTS
OD_MEIBOMITIS: RLL RUL 1+ 2+
OS_MEIBOMITIS: LLL LUL 1+ 2+

## 2023-07-18 ENCOUNTER — EMERGENCY (EMERGENCY)
Facility: HOSPITAL | Age: 58
LOS: 0 days | Discharge: HOME | End: 2023-07-18
Attending: STUDENT IN AN ORGANIZED HEALTH CARE EDUCATION/TRAINING PROGRAM
Payer: COMMERCIAL

## 2023-07-18 VITALS
SYSTOLIC BLOOD PRESSURE: 143 MMHG | HEIGHT: 66 IN | WEIGHT: 190.04 LBS | OXYGEN SATURATION: 98 % | RESPIRATION RATE: 18 BRPM | HEART RATE: 88 BPM | DIASTOLIC BLOOD PRESSURE: 84 MMHG | TEMPERATURE: 99 F

## 2023-07-18 VITALS
OXYGEN SATURATION: 98 % | HEART RATE: 89 BPM | DIASTOLIC BLOOD PRESSURE: 88 MMHG | TEMPERATURE: 99 F | RESPIRATION RATE: 17 BRPM | SYSTOLIC BLOOD PRESSURE: 151 MMHG

## 2023-07-18 DIAGNOSIS — Z90.710 ACQUIRED ABSENCE OF BOTH CERVIX AND UTERUS: ICD-10-CM

## 2023-07-18 DIAGNOSIS — Z90.710 ACQUIRED ABSENCE OF BOTH CERVIX AND UTERUS: Chronic | ICD-10-CM

## 2023-07-18 DIAGNOSIS — F32.A DEPRESSION, UNSPECIFIED: ICD-10-CM

## 2023-07-18 DIAGNOSIS — I10 ESSENTIAL (PRIMARY) HYPERTENSION: ICD-10-CM

## 2023-07-18 DIAGNOSIS — R10.9 UNSPECIFIED ABDOMINAL PAIN: ICD-10-CM

## 2023-07-18 DIAGNOSIS — E11.9 TYPE 2 DIABETES MELLITUS WITHOUT COMPLICATIONS: ICD-10-CM

## 2023-07-18 DIAGNOSIS — Z86.73 PERSONAL HISTORY OF TRANSIENT ISCHEMIC ATTACK (TIA), AND CEREBRAL INFARCTION WITHOUT RESIDUAL DEFICITS: ICD-10-CM

## 2023-07-18 DIAGNOSIS — R11.2 NAUSEA WITH VOMITING, UNSPECIFIED: ICD-10-CM

## 2023-07-18 DIAGNOSIS — Z88.5 ALLERGY STATUS TO NARCOTIC AGENT: ICD-10-CM

## 2023-07-18 DIAGNOSIS — R68.83 CHILLS (WITHOUT FEVER): ICD-10-CM

## 2023-07-18 DIAGNOSIS — Z79.82 LONG TERM (CURRENT) USE OF ASPIRIN: ICD-10-CM

## 2023-07-18 DIAGNOSIS — Z20.822 CONTACT WITH AND (SUSPECTED) EXPOSURE TO COVID-19: ICD-10-CM

## 2023-07-18 DIAGNOSIS — Z79.01 LONG TERM (CURRENT) USE OF ANTICOAGULANTS: ICD-10-CM

## 2023-07-18 DIAGNOSIS — Z86.2 PERSONAL HISTORY OF DISEASES OF THE BLOOD AND BLOOD-FORMING ORGANS AND CERTAIN DISORDERS INVOLVING THE IMMUNE MECHANISM: ICD-10-CM

## 2023-07-18 LAB
ALBUMIN SERPL ELPH-MCNC: 3.9 G/DL — SIGNIFICANT CHANGE UP (ref 3.3–5)
ALP SERPL-CCNC: 48 U/L — SIGNIFICANT CHANGE UP (ref 40–120)
ALT FLD-CCNC: 21 U/L — SIGNIFICANT CHANGE UP (ref 12–78)
ANION GAP SERPL CALC-SCNC: 6 MMOL/L — SIGNIFICANT CHANGE UP (ref 5–17)
APPEARANCE UR: CLEAR — SIGNIFICANT CHANGE UP
AST SERPL-CCNC: 19 U/L — SIGNIFICANT CHANGE UP (ref 15–37)
B-OH-BUTYR SERPL-SCNC: 0.1 MMOL/L — SIGNIFICANT CHANGE UP
BACTERIA # UR AUTO: ABNORMAL
BASE EXCESS BLDV CALC-SCNC: 2.5 MMOL/L — SIGNIFICANT CHANGE UP (ref -2–3)
BASOPHILS # BLD AUTO: 0.02 K/UL — SIGNIFICANT CHANGE UP (ref 0–0.2)
BASOPHILS NFR BLD AUTO: 0.2 % — SIGNIFICANT CHANGE UP (ref 0–2)
BILIRUB SERPL-MCNC: 0.3 MG/DL — SIGNIFICANT CHANGE UP (ref 0.2–1.2)
BILIRUB UR-MCNC: NEGATIVE — SIGNIFICANT CHANGE UP
BLOOD GAS COMMENTS, VENOUS: SIGNIFICANT CHANGE UP
BUN SERPL-MCNC: 17 MG/DL — SIGNIFICANT CHANGE UP (ref 7–23)
CALCIUM SERPL-MCNC: 8.9 MG/DL — SIGNIFICANT CHANGE UP (ref 8.5–10.1)
CHLORIDE SERPL-SCNC: 109 MMOL/L — HIGH (ref 96–108)
CO2 BLDV-SCNC: 30 MMOL/L — HIGH (ref 22–26)
CO2 SERPL-SCNC: 27 MMOL/L — SIGNIFICANT CHANGE UP (ref 22–31)
COLOR SPEC: YELLOW — SIGNIFICANT CHANGE UP
COMMENT - URINE: SIGNIFICANT CHANGE UP
CREAT SERPL-MCNC: 0.72 MG/DL — SIGNIFICANT CHANGE UP (ref 0.5–1.3)
DIFF PNL FLD: ABNORMAL
EGFR: 98 ML/MIN/1.73M2 — SIGNIFICANT CHANGE UP
EOSINOPHIL # BLD AUTO: 0.04 K/UL — SIGNIFICANT CHANGE UP (ref 0–0.5)
EOSINOPHIL NFR BLD AUTO: 0.4 % — SIGNIFICANT CHANGE UP (ref 0–6)
EPI CELLS # UR: SIGNIFICANT CHANGE UP
FLUAV AG NPH QL: SIGNIFICANT CHANGE UP
FLUBV AG NPH QL: SIGNIFICANT CHANGE UP
GAS PNL BLDV: SIGNIFICANT CHANGE UP
GLUCOSE SERPL-MCNC: 155 MG/DL — HIGH (ref 70–99)
GLUCOSE UR QL: NEGATIVE MG/DL — SIGNIFICANT CHANGE UP
HCO3 BLDV-SCNC: 28 MMOL/L — SIGNIFICANT CHANGE UP (ref 22–28)
HCT VFR BLD CALC: 38.2 % — SIGNIFICANT CHANGE UP (ref 34.5–45)
HGB BLD-MCNC: 11.4 G/DL — LOW (ref 11.5–15.5)
IMM GRANULOCYTES NFR BLD AUTO: 0.3 % — SIGNIFICANT CHANGE UP (ref 0–0.9)
KETONES UR-MCNC: NEGATIVE — SIGNIFICANT CHANGE UP
LEUKOCYTE ESTERASE UR-ACNC: NEGATIVE — SIGNIFICANT CHANGE UP
LIDOCAIN IGE QN: 156 U/L — SIGNIFICANT CHANGE UP (ref 73–393)
LYMPHOCYTES # BLD AUTO: 1.02 K/UL — SIGNIFICANT CHANGE UP (ref 1–3.3)
LYMPHOCYTES # BLD AUTO: 11.3 % — LOW (ref 13–44)
MANUAL SMEAR VERIFICATION: SIGNIFICANT CHANGE UP
MCHC RBC-ENTMCNC: 21.9 PG — LOW (ref 27–34)
MCHC RBC-ENTMCNC: 29.8 G/DL — LOW (ref 32–36)
MCV RBC AUTO: 73.5 FL — LOW (ref 80–100)
MICROCYTES BLD QL: SLIGHT — SIGNIFICANT CHANGE UP
MONOCYTES # BLD AUTO: 0.34 K/UL — SIGNIFICANT CHANGE UP (ref 0–0.9)
MONOCYTES NFR BLD AUTO: 3.8 % — SIGNIFICANT CHANGE UP (ref 2–14)
NEUTROPHILS # BLD AUTO: 7.57 K/UL — HIGH (ref 1.8–7.4)
NEUTROPHILS NFR BLD AUTO: 84 % — HIGH (ref 43–77)
NITRITE UR-MCNC: NEGATIVE — SIGNIFICANT CHANGE UP
NRBC # BLD: 0 /100 WBCS — SIGNIFICANT CHANGE UP (ref 0–0)
PCO2 BLDV: 48 MMHG — SIGNIFICANT CHANGE UP (ref 42–55)
PH BLDV: 7.38 — SIGNIFICANT CHANGE UP (ref 7.32–7.43)
PH UR: 7 — SIGNIFICANT CHANGE UP (ref 5–8)
PLAT MORPH BLD: NORMAL — SIGNIFICANT CHANGE UP
PLATELET # BLD AUTO: 194 K/UL — SIGNIFICANT CHANGE UP (ref 150–400)
PO2 BLDV: 28 MMHG — SIGNIFICANT CHANGE UP (ref 25–45)
POTASSIUM SERPL-MCNC: 3.8 MMOL/L — SIGNIFICANT CHANGE UP (ref 3.5–5.3)
POTASSIUM SERPL-SCNC: 3.8 MMOL/L — SIGNIFICANT CHANGE UP (ref 3.5–5.3)
PROT SERPL-MCNC: 8 GM/DL — SIGNIFICANT CHANGE UP (ref 6–8.3)
PROT UR-MCNC: 30 MG/DL
RBC # BLD: 5.2 M/UL — SIGNIFICANT CHANGE UP (ref 3.8–5.2)
RBC # FLD: 15.1 % — HIGH (ref 10.3–14.5)
RBC BLD AUTO: SIGNIFICANT CHANGE UP
RBC CASTS # UR COMP ASSIST: SIGNIFICANT CHANGE UP /HPF (ref 0–4)
SAO2 % BLDV: 44.5 % — LOW (ref 94–98)
SARS-COV-2 RNA SPEC QL NAA+PROBE: SIGNIFICANT CHANGE UP
SODIUM SERPL-SCNC: 142 MMOL/L — SIGNIFICANT CHANGE UP (ref 135–145)
SP GR SPEC: 1.01 — SIGNIFICANT CHANGE UP (ref 1.01–1.02)
UROBILINOGEN FLD QL: NEGATIVE MG/DL — SIGNIFICANT CHANGE UP
WBC # BLD: 9.02 K/UL — SIGNIFICANT CHANGE UP (ref 3.8–10.5)
WBC # FLD AUTO: 9.02 K/UL — SIGNIFICANT CHANGE UP (ref 3.8–10.5)
WBC UR QL: SIGNIFICANT CHANGE UP

## 2023-07-18 PROCEDURE — 71045 X-RAY EXAM CHEST 1 VIEW: CPT | Mod: 26

## 2023-07-18 PROCEDURE — 99285 EMERGENCY DEPT VISIT HI MDM: CPT

## 2023-07-18 RX ORDER — SODIUM CHLORIDE 9 MG/ML
1000 INJECTION INTRAMUSCULAR; INTRAVENOUS; SUBCUTANEOUS ONCE
Refills: 0 | Status: COMPLETED | OUTPATIENT
Start: 2023-07-18 | End: 2023-07-18

## 2023-07-18 RX ORDER — ONDANSETRON 8 MG/1
4 TABLET, FILM COATED ORAL ONCE
Refills: 0 | Status: COMPLETED | OUTPATIENT
Start: 2023-07-18 | End: 2023-07-18

## 2023-07-18 RX ORDER — ONDANSETRON 8 MG/1
1 TABLET, FILM COATED ORAL
Qty: 9 | Refills: 0
Start: 2023-07-18 | End: 2023-07-20

## 2023-07-18 RX ADMIN — SODIUM CHLORIDE 1000 MILLILITER(S): 9 INJECTION INTRAMUSCULAR; INTRAVENOUS; SUBCUTANEOUS at 07:45

## 2023-07-18 RX ADMIN — ONDANSETRON 4 MILLIGRAM(S): 8 TABLET, FILM COATED ORAL at 07:45

## 2023-07-18 NOTE — ED PROVIDER NOTE - CONSTITUTIONAL, MLM
Comfortable appearing female sitting up in bed with  at the bedside. Nontoxic appearing and well hydrated appearing. Emesis in basis in nonbloody and nonbilious. normal...

## 2023-07-18 NOTE — ED PROVIDER NOTE - PATIENT PORTAL LINK FT
You can access the FollowMyHealth Patient Portal offered by [facility] by registering at the following website: [email or web address]. By joining [employer]’s FollowMyHealth portal, you will also be able to view your health information using other applications (apps) compatible with our system.

## 2023-07-18 NOTE — ED PROVIDER NOTE - NSFOLLOWUPINSTRUCTIONS_ED_ALL_ED_FT
Call your primary doctor to obtain close follow-up within the next 2 to 3 days for reevaluation.  Please take the prescribed Zofran 4 mg once every 8 hours as needed for recurrent nausea or vomiting.  Please return immediately if you develop any new or worsening symptoms such as recurrent abdominal discomfort, nausea or vomiting, inability to eat or drink secondary to symptoms, black or bloody bowel movements, lightheadedness or dizziness, passing out, flank pain, fevers or chills, neck pain or stiffness, headache, blurred or double vision.

## 2023-07-18 NOTE — ED ADULT NURSE NOTE - NSFALLUNIVINTERV_ED_ALL_ED
Bed/Stretcher in lowest position, wheels locked, appropriate side rails in place/Call bell, personal items and telephone in reach/Instruct patient to call for assistance before getting out of bed/chair/stretcher/Non-slip footwear applied when patient is off stretcher/[place] to call system/Physically safe environment - no spills, clutter or unnecessary equipment/Purposeful proactive rounding/Room/bathroom lighting operational, light cord in reach

## 2023-07-18 NOTE — ED ADULT NURSE REASSESSMENT NOTE - NS ED NURSE REASSESS COMMENT FT1
Pt endorsed from night time RN. Labs and meds pending.   Pt in stretcher complaining of nausea. Boyfriend at bedside.   [place] given. Safety maintained.

## 2023-07-18 NOTE — ED ADULT TRIAGE NOTE - CHIEF COMPLAINT QUOTE
Patient c/o abdominal pain and multiple episodes of vomiting x 3 hours. Vomited 1xn triage. Pmh htn, bipolar, depression.

## 2023-07-18 NOTE — ED ADULT NURSE NOTE - OBJECTIVE STATEMENT
Pt A&Ox4. Pt accompanied by boyfriend. Patient c/o abdominal pain and multiple episodes of vomiting x 3 hours. Vomited 1xn triage. Pmh htn, bipolar, depression. Pt denies chest pain, dizziness, SOB, or difficulty breathing. Pt is nauseous and weak.

## 2023-07-18 NOTE — ED PROVIDER NOTE - OBJECTIVE STATEMENT
Patient is a 58-year-old female with past medical history of type 2 diabetes, hypertension, depression, who presents for evaluation of nausea with vomiting and abdominal discomfort.  Patient states that her symptoms started abruptly at around 230 this morning.  She says that it started with some crampy abdominal discomfort that has been waxing and waning since without any particular provoking remitting factors, mild in severity.  She has had multiple episodes of nonbloody nonbilious emesis and one episode in triage here.  She reports no history of similar symptoms in the past.  She states that yesterday prior to onset of this she felt okay but since onset she has had some chills without fever.  She has no family members that are ill but does report some sick contacts at work.  She reports traveling to [place] for 4 days last week.  Patient denies any recent changes in her bowel movements, constipation, diarrhea, black or bloody bowel movements, urinary symptoms such as dysuria, urgency, frequency, vaginal bleeding or discharge.

## 2023-07-18 NOTE — ED PROVIDER NOTE - CPE EDP NEURO NORM
normal... Patient presents to ED with fever TMax 103 x 5 days. Patient awake and alert ,easy WOB.   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 Denies SHx, NKDA. IUTD

## 2023-07-18 NOTE — ED ADULT NURSE NOTE - CHIEF COMPLAINT QUOTE
Patient c/o abdominal pain and multiple episodes of vomiting x 3 hours. Vomited 1xn triage. Pmh htn, bipolar, depression.
No

## 2023-07-18 NOTE — ED PROVIDER NOTE - CLINICAL SUMMARY MEDICAL DECISION MAKING FREE TEXT BOX
Upon initial presentation patient found resting in bed in no acute distress.  She is nontoxic-appearing appears well-hydrated.  Presents for evaluation of abdominal pain with nausea and vomiting.  Has a very reassuring examination without any tenderness, rebound or guarding nondistended on exam.  Symptoms are most suspicious for viral infection but we will investigate associated electrolyte abnormality, signs of dehydration, or DKA given her history.  Labs were obtained and all very reassuring.  Heme: Mildly decreasing patient does note history of chronic anemia.  Likely cell count is not elevated.  Electrolytes are reassuring.  On reassessment she reports significant proving her symptoms after receiving IV fluids and Zofran here.  Chest x-ray is unremarkable.  I do feel  patient can be safely discharged home to follow-up closely with her primary doctor for reevaluation given reassuring assessment here.  She was given prescription for Zofran and close return precautions which she expressed understanding of.  Patient was instructed to call her primary doctor to obtain close follow-up and return immediately if she develops any new or worsening symptoms which she expressed understanding of.  She was discharged home in stable condition.

## 2023-12-11 ENCOUNTER — OFFICE (OUTPATIENT)
Dept: URBAN - METROPOLITAN AREA CLINIC 109 | Facility: CLINIC | Age: 58
Setting detail: OPHTHALMOLOGY
End: 2023-12-11
Payer: COMMERCIAL

## 2023-12-11 DIAGNOSIS — H16.221: ICD-10-CM

## 2023-12-11 DIAGNOSIS — H40.023: ICD-10-CM

## 2023-12-11 DIAGNOSIS — E11.9: ICD-10-CM

## 2023-12-11 PROBLEM — H35.54 RPE CHANGES: Status: ACTIVE | Noted: 2023-12-11

## 2023-12-11 PROCEDURE — 92133 CPTRZD OPH DX IMG PST SGM ON: CPT | Performed by: OPHTHALMOLOGY

## 2023-12-11 PROCEDURE — 92014 COMPRE OPH EXAM EST PT 1/>: CPT | Performed by: OPHTHALMOLOGY

## 2023-12-11 PROCEDURE — 92083 EXTENDED VISUAL FIELD XM: CPT | Performed by: OPHTHALMOLOGY

## 2023-12-11 ASSESSMENT — REFRACTION_AUTOREFRACTION
OS_AXIS: 87
OS_SPHERE: +1.50
OS_CYLINDER: -0.50
OD_SPHERE: +1.25

## 2023-12-11 ASSESSMENT — REFRACTION_MANIFEST
OS_SPHERE: +2.75
OS_AXIS: 85
OD_ADD: +2.00
OD_VA1: 20/20-
OD_SPHERE: +2.75
OS_VA1: 20/20-
OS_SPHERE: +1.25
OS_CYLINDER: -0.50
OD_SPHERE: +1.25
OS_ADD: +2.00

## 2023-12-11 ASSESSMENT — CONFRONTATIONAL VISUAL FIELD TEST (CVF)
OD_FINDINGS: FULL
OS_FINDINGS: FULL

## 2023-12-11 ASSESSMENT — LID EXAM ASSESSMENTS
OD_MEIBOMITIS: RLL RUL 1+ 2+
OS_MEIBOMITIS: LLL LUL 1+ 2+

## 2023-12-11 ASSESSMENT — SPHEQUIV_DERIVED
OS_SPHEQUIV: 1.25
OS_SPHEQUIV: 2.5

## 2023-12-11 ASSESSMENT — REFRACTION_CURRENTRX
OS_OVR_VA: 20/
OD_OVR_VA: 20/
OS_SPHERE: +2.25
OD_SPHERE: +2.25
OS_AXIS: 79
OS_CYLINDER: -0.50

## 2024-01-01 NOTE — DISCHARGE NOTE NURSING/CASE MANAGEMENT/SOCIAL WORK - NSDCPEFALRISK_GEN_ALL_CORE
Patient information on fall and injury prevention
Chronic factors: hx of substance use  Acute factors: insomnia, aggression/irritability (toward self mainly), depressive sx, manic sx, substance use (THC reported, some ETOH), no outpatient care, recent onset of illness.  Protective factors: stable domicile, intelligent, enrolled student, physical health, supportive family

## 2024-03-06 NOTE — ED ADULT NURSE NOTE - PMH
occupational therapy/social work
Anemia    Angina pectoris    Depression    HTN (hypertension)    TIA (transient ischemic attack)

## 2024-04-11 ENCOUNTER — OFFICE (OUTPATIENT)
Dept: URBAN - METROPOLITAN AREA CLINIC 109 | Facility: CLINIC | Age: 59
Setting detail: OPHTHALMOLOGY
End: 2024-04-11
Payer: COMMERCIAL

## 2024-04-11 DIAGNOSIS — E11.9: ICD-10-CM

## 2024-04-11 DIAGNOSIS — H35.54: ICD-10-CM

## 2024-04-11 DIAGNOSIS — H16.221: ICD-10-CM

## 2024-04-11 DIAGNOSIS — H40.023: ICD-10-CM

## 2024-04-11 PROCEDURE — 92133 CPTRZD OPH DX IMG PST SGM ON: CPT | Performed by: OPHTHALMOLOGY

## 2024-04-11 PROCEDURE — 99213 OFFICE O/P EST LOW 20 MIN: CPT | Performed by: OPHTHALMOLOGY

## 2024-04-11 PROCEDURE — 92083 EXTENDED VISUAL FIELD XM: CPT | Performed by: OPHTHALMOLOGY

## 2024-04-11 ASSESSMENT — LID EXAM ASSESSMENTS
OD_MEIBOMITIS: RLL RUL 1+ 2+
OS_MEIBOMITIS: LLL LUL 1+ 2+

## 2024-04-29 NOTE — OCCUPATIONAL THERAPY INITIAL EVALUATION ADULT - MD ORDER
Addended by: MATT WORTHY on: 4/29/2024 11:57 AM     Modules accepted: Level of Service    
OT Consult & RX

## 2024-05-31 ENCOUNTER — NON-APPOINTMENT (OUTPATIENT)
Age: 59
End: 2024-05-31

## 2024-06-01 ENCOUNTER — EMERGENCY (EMERGENCY)
Facility: HOSPITAL | Age: 59
LOS: 0 days | Discharge: HOME | End: 2024-06-01
Attending: STUDENT IN AN ORGANIZED HEALTH CARE EDUCATION/TRAINING PROGRAM
Payer: COMMERCIAL

## 2024-06-01 VITALS
DIASTOLIC BLOOD PRESSURE: 99 MMHG | RESPIRATION RATE: 19 BRPM | WEIGHT: 190.04 LBS | HEART RATE: 85 BPM | OXYGEN SATURATION: 98 % | HEIGHT: 66 IN | SYSTOLIC BLOOD PRESSURE: 194 MMHG | TEMPERATURE: 98 F

## 2024-06-01 VITALS
TEMPERATURE: 98 F | OXYGEN SATURATION: 100 % | HEART RATE: 74 BPM | SYSTOLIC BLOOD PRESSURE: 162 MMHG | RESPIRATION RATE: 16 BRPM | DIASTOLIC BLOOD PRESSURE: 88 MMHG

## 2024-06-01 DIAGNOSIS — Z90.710 ACQUIRED ABSENCE OF BOTH CERVIX AND UTERUS: Chronic | ICD-10-CM

## 2024-06-01 DIAGNOSIS — R10.9 UNSPECIFIED ABDOMINAL PAIN: ICD-10-CM

## 2024-06-01 DIAGNOSIS — R35.0 FREQUENCY OF MICTURITION: ICD-10-CM

## 2024-06-01 DIAGNOSIS — D64.9 ANEMIA, UNSPECIFIED: ICD-10-CM

## 2024-06-01 DIAGNOSIS — Z86.73 PERSONAL HISTORY OF TRANSIENT ISCHEMIC ATTACK (TIA), AND CEREBRAL INFARCTION WITHOUT RESIDUAL DEFICITS: ICD-10-CM

## 2024-06-01 DIAGNOSIS — I10 ESSENTIAL (PRIMARY) HYPERTENSION: ICD-10-CM

## 2024-06-01 DIAGNOSIS — E11.9 TYPE 2 DIABETES MELLITUS WITHOUT COMPLICATIONS: ICD-10-CM

## 2024-06-01 DIAGNOSIS — R39.15 URGENCY OF URINATION: ICD-10-CM

## 2024-06-01 LAB
ALBUMIN SERPL ELPH-MCNC: 3.6 G/DL — SIGNIFICANT CHANGE UP (ref 3.3–5)
ALP SERPL-CCNC: 48 U/L — SIGNIFICANT CHANGE UP (ref 40–120)
ALT FLD-CCNC: 19 U/L — SIGNIFICANT CHANGE UP (ref 12–78)
ANION GAP SERPL CALC-SCNC: 6 MMOL/L — SIGNIFICANT CHANGE UP (ref 5–17)
APPEARANCE UR: CLEAR — SIGNIFICANT CHANGE UP
AST SERPL-CCNC: 17 U/L — SIGNIFICANT CHANGE UP (ref 15–37)
BASOPHILS # BLD AUTO: 0.02 K/UL — SIGNIFICANT CHANGE UP (ref 0–0.2)
BASOPHILS NFR BLD AUTO: 0.3 % — SIGNIFICANT CHANGE UP (ref 0–2)
BILIRUB SERPL-MCNC: 0.2 MG/DL — SIGNIFICANT CHANGE UP (ref 0.2–1.2)
BILIRUB UR-MCNC: NEGATIVE — SIGNIFICANT CHANGE UP
BUN SERPL-MCNC: 17 MG/DL — SIGNIFICANT CHANGE UP (ref 7–23)
CALCIUM SERPL-MCNC: 8.9 MG/DL — SIGNIFICANT CHANGE UP (ref 8.5–10.1)
CHLORIDE SERPL-SCNC: 109 MMOL/L — HIGH (ref 96–108)
CO2 SERPL-SCNC: 27 MMOL/L — SIGNIFICANT CHANGE UP (ref 22–31)
COLOR SPEC: YELLOW — SIGNIFICANT CHANGE UP
CREAT SERPL-MCNC: 0.74 MG/DL — SIGNIFICANT CHANGE UP (ref 0.5–1.3)
DIFF PNL FLD: NEGATIVE — SIGNIFICANT CHANGE UP
EGFR: 94 ML/MIN/1.73M2 — SIGNIFICANT CHANGE UP
EOSINOPHIL # BLD AUTO: 0.17 K/UL — SIGNIFICANT CHANGE UP (ref 0–0.5)
EOSINOPHIL NFR BLD AUTO: 2.7 % — SIGNIFICANT CHANGE UP (ref 0–6)
GLUCOSE SERPL-MCNC: 95 MG/DL — SIGNIFICANT CHANGE UP (ref 70–99)
GLUCOSE UR QL: >=1000 MG/DL
HCT VFR BLD CALC: 37.6 % — SIGNIFICANT CHANGE UP (ref 34.5–45)
HGB BLD-MCNC: 11.4 G/DL — LOW (ref 11.5–15.5)
IMM GRANULOCYTES NFR BLD AUTO: 0.3 % — SIGNIFICANT CHANGE UP (ref 0–0.9)
KETONES UR-MCNC: NEGATIVE MG/DL — SIGNIFICANT CHANGE UP
LACTATE SERPL-SCNC: 0.9 MMOL/L — SIGNIFICANT CHANGE UP (ref 0.7–2)
LEUKOCYTE ESTERASE UR-ACNC: NEGATIVE — SIGNIFICANT CHANGE UP
LYMPHOCYTES # BLD AUTO: 2.75 K/UL — SIGNIFICANT CHANGE UP (ref 1–3.3)
LYMPHOCYTES # BLD AUTO: 43.9 % — SIGNIFICANT CHANGE UP (ref 13–44)
MCHC RBC-ENTMCNC: 22.2 PG — LOW (ref 27–34)
MCHC RBC-ENTMCNC: 30.3 G/DL — LOW (ref 32–36)
MCV RBC AUTO: 73.3 FL — LOW (ref 80–100)
MONOCYTES # BLD AUTO: 0.55 K/UL — SIGNIFICANT CHANGE UP (ref 0–0.9)
MONOCYTES NFR BLD AUTO: 8.8 % — SIGNIFICANT CHANGE UP (ref 2–14)
NEUTROPHILS # BLD AUTO: 2.75 K/UL — SIGNIFICANT CHANGE UP (ref 1.8–7.4)
NEUTROPHILS NFR BLD AUTO: 44 % — SIGNIFICANT CHANGE UP (ref 43–77)
NITRITE UR-MCNC: NEGATIVE — SIGNIFICANT CHANGE UP
NRBC # BLD: 0 /100 WBCS — SIGNIFICANT CHANGE UP (ref 0–0)
PH UR: 6 — SIGNIFICANT CHANGE UP (ref 5–8)
PLATELET # BLD AUTO: 182 K/UL — SIGNIFICANT CHANGE UP (ref 150–400)
POTASSIUM SERPL-MCNC: 3.9 MMOL/L — SIGNIFICANT CHANGE UP (ref 3.5–5.3)
POTASSIUM SERPL-SCNC: 3.9 MMOL/L — SIGNIFICANT CHANGE UP (ref 3.5–5.3)
PROT SERPL-MCNC: 7.5 GM/DL — SIGNIFICANT CHANGE UP (ref 6–8.3)
PROT UR-MCNC: NEGATIVE MG/DL — SIGNIFICANT CHANGE UP
RBC # BLD: 5.13 M/UL — SIGNIFICANT CHANGE UP (ref 3.8–5.2)
RBC # FLD: 14.6 % — HIGH (ref 10.3–14.5)
SODIUM SERPL-SCNC: 142 MMOL/L — SIGNIFICANT CHANGE UP (ref 135–145)
SP GR SPEC: 1.01 — SIGNIFICANT CHANGE UP (ref 1–1.03)
UROBILINOGEN FLD QL: 0.2 MG/DL — SIGNIFICANT CHANGE UP (ref 0.2–1)
WBC # BLD: 6.26 K/UL — SIGNIFICANT CHANGE UP (ref 3.8–10.5)
WBC # FLD AUTO: 6.26 K/UL — SIGNIFICANT CHANGE UP (ref 3.8–10.5)

## 2024-06-01 PROCEDURE — 74177 CT ABD & PELVIS W/CONTRAST: CPT | Mod: 26,MC

## 2024-06-01 PROCEDURE — 99285 EMERGENCY DEPT VISIT HI MDM: CPT

## 2024-06-01 PROCEDURE — 93010 ELECTROCARDIOGRAM REPORT: CPT

## 2024-06-01 RX ORDER — DIVALPROEX SODIUM 500 MG/1
2 TABLET, DELAYED RELEASE ORAL
Qty: 0 | Refills: 0 | DISCHARGE

## 2024-06-01 RX ORDER — RISPERIDONE 4 MG/1
1 TABLET ORAL
Qty: 0 | Refills: 0 | DISCHARGE

## 2024-06-01 RX ORDER — RIVAROXABAN 15 MG-20MG
1 KIT ORAL
Qty: 0 | Refills: 0 | DISCHARGE

## 2024-06-01 RX ORDER — PROGESTERONE 200 MG/1
2 CAPSULE, LIQUID FILLED ORAL
Refills: 0 | DISCHARGE

## 2024-06-01 RX ORDER — SODIUM CHLORIDE 9 MG/ML
1000 INJECTION INTRAMUSCULAR; INTRAVENOUS; SUBCUTANEOUS ONCE
Refills: 0 | Status: COMPLETED | OUTPATIENT
Start: 2024-06-01 | End: 2024-06-01

## 2024-06-01 RX ORDER — LOSARTAN POTASSIUM 100 MG/1
1 TABLET, FILM COATED ORAL
Refills: 0 | DISCHARGE

## 2024-06-01 RX ORDER — SIMVASTATIN 20 MG/1
1 TABLET, FILM COATED ORAL
Refills: 0 | DISCHARGE

## 2024-06-01 RX ORDER — EMPAGLIFLOZIN 10 MG/1
1 TABLET, FILM COATED ORAL
Refills: 0 | DISCHARGE

## 2024-06-01 RX ORDER — FERROUS SULFATE 325(65) MG
1 TABLET ORAL
Qty: 0 | Refills: 0 | DISCHARGE

## 2024-06-01 RX ORDER — KETOROLAC TROMETHAMINE 30 MG/ML
30 SYRINGE (ML) INJECTION ONCE
Refills: 0 | Status: DISCONTINUED | OUTPATIENT
Start: 2024-06-01 | End: 2024-06-01

## 2024-06-01 RX ORDER — NIFEDIPINE 30 MG
1 TABLET, EXTENDED RELEASE 24 HR ORAL
Qty: 0 | Refills: 0 | DISCHARGE

## 2024-06-01 RX ORDER — METFORMIN HYDROCHLORIDE 850 MG/1
1 TABLET ORAL
Refills: 0 | DISCHARGE

## 2024-06-01 RX ADMIN — SODIUM CHLORIDE 1000 MILLILITER(S): 9 INJECTION INTRAMUSCULAR; INTRAVENOUS; SUBCUTANEOUS at 19:32

## 2024-06-01 RX ADMIN — Medication 30 MILLIGRAM(S): at 19:47

## 2024-06-01 RX ADMIN — SODIUM CHLORIDE 1000 MILLILITER(S): 9 INJECTION INTRAMUSCULAR; INTRAVENOUS; SUBCUTANEOUS at 20:32

## 2024-06-01 RX ADMIN — Medication 30 MILLIGRAM(S): at 19:32

## 2024-06-01 NOTE — ED ADULT NURSE NOTE - OBJECTIVE STATEMENT
Pt c/o right flank pain, frequent/ burning urination, and vaginal itching x3 days. pt also complains of pressure in the head. Pt states that she has hypertensive episodes

## 2024-06-01 NOTE — ED PROVIDER NOTE - CLINICAL SUMMARY MEDICAL DECISION MAKING FREE TEXT BOX
59 year old female with h/o htn, dm, cva, tia, depression and anemia presents today c/o right flank pain and urinary s ymptoms x 3 days, she describes intermittent sharp pains associated with urinary frequency and urgency, pt denies hematuria, fevers or chills, abdominal pain, chest pain or sob. On exam pt is well appearing, awake, alert and oriented x 3, nontoxic/nonlethargic appearing and in no distress. Findings on exam include right flank tenderness with palpation (-) cva tenderness, abd soft and nontender/nondistended, (-) guarding or rebound, her exam is otherwise benign. Differential diagnosis includes but not limited to muscle strain, pyelonephritis, kidney stones, uti. Labs ordered, ct, pain control.  will reasses and dispo    Labs reviewed,  ua positive for glucose, (-) LE (-) nitrite (-) blood (-) bacteria  ct negative for stones or infection   due to her symptoms pt will be started on antibioitcs, urine culture sent  told to follow up with her pmd

## 2024-06-01 NOTE — ED ADULT NURSE NOTE - NSFALLUNIVINTERV_ED_ALL_ED
Bed/Stretcher in lowest position, wheels locked, appropriate side rails in place/Call bell, personal items and telephone in reach/Instruct patient to call for assistance before getting out of bed/chair/stretcher/Non-slip footwear applied when patient is off stretcher/[place] to call system/Physically safe environment - no spills, clutter or unnecessary equipment/Purposeful proactive rounding/Room/bathroom lighting operational, light cord in reach

## 2024-06-01 NOTE — ED PROVIDER NOTE - PATIENT PORTAL LINK FT
You can access the FollowMyHealth Patient Portal offered by [facility] by registering at the following website: [email or web address]. By joining [employer]’s FollowMyHealth portal, you will also be able to view your health information using other applications (apps) compatible with our system.

## 2024-06-01 NOTE — ED PROVIDER NOTE - OBJECTIVE STATEMENT
Discharge Planner OT   Patient plan for discharge: TCU  Current status: A of 2 to roll to place LIKO sling under patient. Max A of 1 to transfer to EOB for both advancement of B LE across mattress and for lifting of trunk from mattress. Once EOB and balance established, patient was min A at times to maintain sitting balance. Patient tends to lean to L when Using B UE in ADL tasks. Patient is SBA when supported by 1 UE for sitting balance. Patient completed oral cares, sponge bath of B UE's and upper thighs, applied lotion and deodorant and changed gowns with min A for gathering and holding supplies as needed. Transferred to chair with lift and A of 2.  Barriers to return to prior living situation: Current level of assist for ADLs, lift assist for transfers, decreased strength and functional activity tolerance  Recommendations for discharge: TCU  Rationale for recommendations: Pt is significantly below baseline level of functioning in areas of ADLs and mobility tasks. Recommend ongoing skilled OT to maximize safety and indep through improved strength, balance, and functional activity tolerance.        Entered by: Montserrat Jeff 02/23/2019 10:34 AM        see mdm

## 2024-06-01 NOTE — ED ADULT TRIAGE NOTE - CHIEF COMPLAINT QUOTE
Came in for right flank pain and frequent urination x3 days. Also complains of pressure in the head. Hypertensive in triage. Took BP meds  at 10am today. 194/115 right arm, left arm 194/99.

## 2024-06-03 LAB
CULTURE RESULTS: SIGNIFICANT CHANGE UP
SPECIMEN SOURCE: SIGNIFICANT CHANGE UP

## 2024-06-19 NOTE — PATIENT PROFILE ADULT - NSPROPOAURINARYCATHETER_GEN_A_NUR
[FreeTextEntry1] : Hypercalcemia  secondary to 1 hyperPTH s/p parathyroidectomy with Dr Kaufman June 2024.  sent for FNA 1.3 cm thyroid nodule: thyroid nodule benign. cont  Fosamax 70 mg /weekly for OP (T score -5.2)  leg pain and feet pain, some joint pain all improved.  serum calcium normal now , PTH 34  repeat DEXA  in 6 mos   Prediabetes: A1c 6.  Discussed diet and exercise start walking 3 xweek   Low B12 : 1000 mcg qd b12  HLD: high lipids. She declines statin. She needs statin due to obesity and prediabetes.   Obesity:  discussed diet and exercise encouraged more exercise walking 30 min 3 x week NAFLD: monitor LFts.  High AST .Continue to monitor.   Vitamin D defic: cont 1000 u vit D daily. no

## 2024-09-15 ENCOUNTER — EMERGENCY (EMERGENCY)
Facility: HOSPITAL | Age: 59
LOS: 0 days | Discharge: HOME | End: 2024-09-16
Attending: EMERGENCY MEDICINE | Admitting: STUDENT IN AN ORGANIZED HEALTH CARE EDUCATION/TRAINING PROGRAM
Payer: COMMERCIAL

## 2024-09-15 VITALS
DIASTOLIC BLOOD PRESSURE: 77 MMHG | OXYGEN SATURATION: 100 % | SYSTOLIC BLOOD PRESSURE: 166 MMHG | TEMPERATURE: 99 F | RESPIRATION RATE: 16 BRPM | HEART RATE: 73 BPM | HEIGHT: 66 IN | WEIGHT: 184.97 LBS

## 2024-09-15 DIAGNOSIS — Z88.6 ALLERGY STATUS TO ANALGESIC AGENT: ICD-10-CM

## 2024-09-15 DIAGNOSIS — G45.9 TRANSIENT CEREBRAL ISCHEMIC ATTACK, UNSPECIFIED: ICD-10-CM

## 2024-09-15 DIAGNOSIS — Z86.73 PERSONAL HISTORY OF TRANSIENT ISCHEMIC ATTACK (TIA), AND CEREBRAL INFARCTION WITHOUT RESIDUAL DEFICITS: ICD-10-CM

## 2024-09-15 DIAGNOSIS — R20.0 ANESTHESIA OF SKIN: ICD-10-CM

## 2024-09-15 DIAGNOSIS — R51.9 HEADACHE, UNSPECIFIED: ICD-10-CM

## 2024-09-15 DIAGNOSIS — Z90.710 ACQUIRED ABSENCE OF BOTH CERVIX AND UTERUS: Chronic | ICD-10-CM

## 2024-09-15 DIAGNOSIS — E11.9 TYPE 2 DIABETES MELLITUS WITHOUT COMPLICATIONS: ICD-10-CM

## 2024-09-15 DIAGNOSIS — F32.9 MAJOR DEPRESSIVE DISORDER, SINGLE EPISODE, UNSPECIFIED: ICD-10-CM

## 2024-09-15 DIAGNOSIS — I10 ESSENTIAL (PRIMARY) HYPERTENSION: ICD-10-CM

## 2024-09-15 DIAGNOSIS — R42 DIZZINESS AND GIDDINESS: ICD-10-CM

## 2024-09-15 LAB
ALBUMIN SERPL ELPH-MCNC: 3.6 G/DL — SIGNIFICANT CHANGE UP (ref 3.3–5)
ALP SERPL-CCNC: 42 U/L — SIGNIFICANT CHANGE UP (ref 40–120)
ALT FLD-CCNC: 19 U/L — SIGNIFICANT CHANGE UP (ref 12–78)
ANION GAP SERPL CALC-SCNC: 3 MMOL/L — LOW (ref 5–17)
APTT BLD: 41 SEC — HIGH (ref 24.5–35.6)
AST SERPL-CCNC: 12 U/L — LOW (ref 15–37)
BASOPHILS # BLD AUTO: 0.03 K/UL — SIGNIFICANT CHANGE UP (ref 0–0.2)
BASOPHILS NFR BLD AUTO: 0.5 % — SIGNIFICANT CHANGE UP (ref 0–2)
BILIRUB SERPL-MCNC: 0.2 MG/DL — SIGNIFICANT CHANGE UP (ref 0.2–1.2)
BUN SERPL-MCNC: 13 MG/DL — SIGNIFICANT CHANGE UP (ref 7–23)
CALCIUM SERPL-MCNC: 9.2 MG/DL — SIGNIFICANT CHANGE UP (ref 8.5–10.1)
CHLORIDE SERPL-SCNC: 110 MMOL/L — HIGH (ref 96–108)
CO2 SERPL-SCNC: 30 MMOL/L — SIGNIFICANT CHANGE UP (ref 22–31)
CREAT SERPL-MCNC: 0.71 MG/DL — SIGNIFICANT CHANGE UP (ref 0.5–1.3)
EGFR: 99 ML/MIN/1.73M2 — SIGNIFICANT CHANGE UP
EOSINOPHIL # BLD AUTO: 0.1 K/UL — SIGNIFICANT CHANGE UP (ref 0–0.5)
EOSINOPHIL NFR BLD AUTO: 1.8 % — SIGNIFICANT CHANGE UP (ref 0–6)
GLUCOSE BLDC GLUCOMTR-MCNC: 112 MG/DL — HIGH (ref 70–99)
GLUCOSE BLDC GLUCOMTR-MCNC: 118 MG/DL — HIGH (ref 70–99)
GLUCOSE SERPL-MCNC: 124 MG/DL — HIGH (ref 70–99)
HCT VFR BLD CALC: 37.9 % — SIGNIFICANT CHANGE UP (ref 34.5–45)
HGB BLD-MCNC: 11.2 G/DL — LOW (ref 11.5–15.5)
IMM GRANULOCYTES NFR BLD AUTO: 0.4 % — SIGNIFICANT CHANGE UP (ref 0–0.9)
INR BLD: 1.14 RATIO — SIGNIFICANT CHANGE UP (ref 0.85–1.18)
LYMPHOCYTES # BLD AUTO: 2.51 K/UL — SIGNIFICANT CHANGE UP (ref 1–3.3)
LYMPHOCYTES # BLD AUTO: 44.8 % — HIGH (ref 13–44)
MCHC RBC-ENTMCNC: 22.2 PG — LOW (ref 27–34)
MCHC RBC-ENTMCNC: 29.6 G/DL — LOW (ref 32–36)
MCV RBC AUTO: 75 FL — LOW (ref 80–100)
MONOCYTES # BLD AUTO: 0.52 K/UL — SIGNIFICANT CHANGE UP (ref 0–0.9)
MONOCYTES NFR BLD AUTO: 9.3 % — SIGNIFICANT CHANGE UP (ref 2–14)
NEUTROPHILS # BLD AUTO: 2.42 K/UL — SIGNIFICANT CHANGE UP (ref 1.8–7.4)
NEUTROPHILS NFR BLD AUTO: 43.2 % — SIGNIFICANT CHANGE UP (ref 43–77)
NRBC # BLD: 0 /100 WBCS — SIGNIFICANT CHANGE UP (ref 0–0)
PLATELET # BLD AUTO: 187 K/UL — SIGNIFICANT CHANGE UP (ref 150–400)
POTASSIUM SERPL-MCNC: 4.3 MMOL/L — SIGNIFICANT CHANGE UP (ref 3.5–5.3)
POTASSIUM SERPL-SCNC: 4.3 MMOL/L — SIGNIFICANT CHANGE UP (ref 3.5–5.3)
PROT SERPL-MCNC: 7.6 GM/DL — SIGNIFICANT CHANGE UP (ref 6–8.3)
PROTHROM AB SERPL-ACNC: 13.6 SEC — HIGH (ref 9.5–13)
RBC # BLD: 5.05 M/UL — SIGNIFICANT CHANGE UP (ref 3.8–5.2)
RBC # FLD: 15.7 % — HIGH (ref 10.3–14.5)
SODIUM SERPL-SCNC: 143 MMOL/L — SIGNIFICANT CHANGE UP (ref 135–145)
TROPONIN I, HIGH SENSITIVITY RESULT: 4 NG/L — SIGNIFICANT CHANGE UP
WBC # BLD: 5.6 K/UL — SIGNIFICANT CHANGE UP (ref 3.8–10.5)
WBC # FLD AUTO: 5.6 K/UL — SIGNIFICANT CHANGE UP (ref 3.8–10.5)

## 2024-09-15 PROCEDURE — 70496 CT ANGIOGRAPHY HEAD: CPT | Mod: 26,MC

## 2024-09-15 PROCEDURE — 99222 1ST HOSP IP/OBS MODERATE 55: CPT

## 2024-09-15 PROCEDURE — 71045 X-RAY EXAM CHEST 1 VIEW: CPT | Mod: 26

## 2024-09-15 PROCEDURE — 70498 CT ANGIOGRAPHY NECK: CPT | Mod: 26,MC

## 2024-09-15 PROCEDURE — 93010 ELECTROCARDIOGRAM REPORT: CPT

## 2024-09-15 PROCEDURE — 0042T: CPT | Mod: MC

## 2024-09-15 PROCEDURE — 99291 CRITICAL CARE FIRST HOUR: CPT

## 2024-09-15 RX ORDER — SERTRALINE HYDROCHLORIDE 50 MG/1
50 TABLET, FILM COATED ORAL DAILY
Refills: 0 | Status: DISCONTINUED | OUTPATIENT
Start: 2024-09-15 | End: 2024-09-16

## 2024-09-15 RX ORDER — LOSARTAN POTASSIUM 50 MG/1
50 TABLET ORAL DAILY
Refills: 0 | Status: DISCONTINUED | OUTPATIENT
Start: 2024-09-16 | End: 2024-09-16

## 2024-09-15 RX ORDER — FOLIC ACID 1 MG
1 TABLET ORAL DAILY
Refills: 0 | Status: DISCONTINUED | OUTPATIENT
Start: 2024-09-15 | End: 2024-09-16

## 2024-09-15 RX ORDER — SEMAGLUTIDE 1 MG/.5ML
0.5 INJECTION, SOLUTION SUBCUTANEOUS
Refills: 0 | DISCHARGE

## 2024-09-15 RX ORDER — ATENOLOL 100 MG
100 TABLET ORAL DAILY
Refills: 0 | Status: DISCONTINUED | OUTPATIENT
Start: 2024-09-16 | End: 2024-09-16

## 2024-09-15 RX ORDER — MAGNESIUM, ALUMINUM HYDROXIDE 200-225/5
30 SUSPENSION, ORAL (FINAL DOSE FORM) ORAL EVERY 4 HOURS
Refills: 0 | Status: DISCONTINUED | OUTPATIENT
Start: 2024-09-15 | End: 2024-09-16

## 2024-09-15 RX ORDER — SPIRONOLACTONE 25 MG/1
50 TABLET, FILM COATED ORAL DAILY
Refills: 0 | Status: DISCONTINUED | OUTPATIENT
Start: 2024-09-16 | End: 2024-09-16

## 2024-09-15 RX ORDER — DIVALPROEX SODIUM 125 MG/1
1000 CAPSULE, DELAYED RELEASE ORAL AT BEDTIME
Refills: 0 | Status: DISCONTINUED | OUTPATIENT
Start: 2024-09-15 | End: 2024-09-16

## 2024-09-15 RX ORDER — RIVAROXABAN 10 MG/1
20 TABLET, FILM COATED ORAL
Refills: 0 | Status: DISCONTINUED | OUTPATIENT
Start: 2024-09-15 | End: 2024-09-16

## 2024-09-15 RX ORDER — RISPERIDONE 0.25 MG/1
0.5 TABLET, FILM COATED ORAL AT BEDTIME
Refills: 0 | Status: DISCONTINUED | OUTPATIENT
Start: 2024-09-15 | End: 2024-09-16

## 2024-09-15 RX ORDER — ONDANSETRON 2 MG/ML
4 INJECTION, SOLUTION INTRAMUSCULAR; INTRAVENOUS EVERY 8 HOURS
Refills: 0 | Status: DISCONTINUED | OUTPATIENT
Start: 2024-09-15 | End: 2024-09-16

## 2024-09-15 RX ADMIN — DIVALPROEX SODIUM 1000 MILLIGRAM(S): 125 CAPSULE, DELAYED RELEASE ORAL at 21:29

## 2024-09-15 RX ADMIN — Medication 40 MILLIGRAM(S): at 21:29

## 2024-09-15 RX ADMIN — RIVAROXABAN 20 MILLIGRAM(S): 10 TABLET, FILM COATED ORAL at 18:48

## 2024-09-15 RX ADMIN — RISPERIDONE 0.5 MILLIGRAM(S): 0.25 TABLET, FILM COATED ORAL at 21:29

## 2024-09-15 NOTE — PATIENT PROFILE ADULT - FUNCTIONAL ASSESSMENT - DAILY ACTIVITY 1.
General Surgery Week 2 Survey    Flowsheet Row Responses   [facility] patient discharged from? [place]   Does the patient have one of the following disease processes/diagnoses(primary or secondary)? General Surgery   Week 2 attempt successful? No   Call start time 1319   Unsuccessful attempts Attempt 1   Revoke Phone number issues   Discharge diagnosis s/p CRANIOPLASTY right with Lumbar drain          PABLO H - Registered Nurse  
4 = No assist / stand by assistance

## 2024-09-15 NOTE — ED PROVIDER NOTE - CLINICAL SUMMARY MEDICAL DECISION MAKING FREE TEXT BOX
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 code stroke was called. Pt most likely had TIA. Pt sts she has a hx of left brain infarction without residual deficits pt is taking Xarelto 15 mg qd last dose was last night.   discussed the case with the radiologist about non contrast ct head no acute finding. Pt is not a candidate for TNK due to NIH score is zero. 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 code stroke was called. Pt most likely had TIA. Pt sts she has a hx of left brain infarction without residual deficits pt is taking Xarelto 15 mg qd last dose was last night.   discussed the case with the radiologist about non contrast ct head no acute finding. Pt is not a candidate for TNK due to NIH score is zero. CTA head/neck  Progress notes Pt does not have focal neuro deficits pt will be admitted to Observation for mr of brain

## 2024-09-15 NOTE — CONSULT NOTE ADULT - SUBJECTIVE AND OBJECTIVE BOX
HPI: 59 year old woman with hx of HTN, DM2, depression, migraines, and stroke (left brain stroke on mri years ago as per patient) presenting with headache and right sided numbness and weakness that began this morning at work. Patient took her blood pressure medications at the start of work and when the symptoms were getting worse, she decided to go down to ED. 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 Patient had similar episodes in the past. Once she was switched from aspirin/Plavix to Xarelto in ~ she stopped having these episodes. Patient says it was started due to palpitations. She had ILR as well in the past that was unremarkable. Symptoms of right sided numbness and weakness resolved in 15-20 minutes. She has not seen Neurology since . She was following with Dr. JOE Sullivan and then Dr. Fontaine. She was put on Depakote 1000mg nightly for depression and migraines in ~.    PMHx: HTN, DM2, Stroke (left brain stroke on mri years ago as per patient), depression, migraines  PSHx: hysterectomy,   PFHx: None  Social Hx: non-smoker, no etoh, no illicit drug use; works as RN  Allergies: Vicodin  ROS: headache, right sided numbness and weakness  Medications: see EMR    Vitals: Temp  98.0F     RR  17      HR 72      /82  General: NAD  CV: regular rate and rhythm  Resp: no respiratory distress  Neck: supple  Neuro Exam: AOx3. Follows commands. No dysarthria. No aphasia. EOM intact. No facial droop. PERRL. VFF. Tongue is midline. Palate elevate symmetrically. Shoulder shrug is intact. Finger to nose and heel to shin intact. Moving all four extremities. No focal weakness. Reflexes symmetric and toes down. Gait exam deferred. Sensory intact to touch.     CT head, CTA head/neck, and labs reviewed

## 2024-09-15 NOTE — H&P ADULT - ASSESSMENT
Ms. Andrews is a 60 y/o female from home w/ PMH of HTN ,DM, TIA, remote hx of CVA, depression and ?arrythmia- on Xarelto presents w/ Rt sided weakness and numbness staring at 8:00 am this morning with spontaneous resolution of her symptoms in next 15-30 minutes. Admitted under observation unit  for TIA     A/P:  #TIA  #HTN  #DM  #Depression   #Arrythmia- ?PAF  #Remote hx of CVA  #DVT ppx     Plan:   Ms. Andrews is a 58 y/o female from home w/ PMH of HTN ,DM, TIA, remote hx of CVA, depression and ?arrythmia- on Xarelto presents w/ Rt sided weakness and numbness staring at 8:00 am this morning with spontaneous resolution of her symptoms in next 15-30 minutes. Admitted under observation unit  for TIA     A/P:  #TIA  #HTN  #DM  #Depression   #Arrythmia- ?PAF  #Remote hx of CVA  #DVT ppx     Plan:  -Patient p/w RT sided weakness which is now resolved. NIHS score 0 on my evaluation   -CT head and CTA head and neck- grossly unremarkable.  -CT perfusion study- demonstrates delayed mean transit time in the right frontal lobe. Small acute ischemic penumbra not excluded.    -Appears to be TIA, ABCD2 score-5 points- should ideally be on DAPT but patient on Xarelto; unclear if she has Afib as patient not fully aware and she takes 15mg OD instead of 20mg OD  Resume Xarelto, dose changed to 20mg. Primary team to reach out to her out-patient cardiologist Dr. Zan Lane to discuss further and clarify indication.   -Start high intensity statin.  -C/w permissive HTN for first 24 hrs, patient already took am BP meds today. Meds resumed for tmrw   -C/w tele, check Hba1C, lipid profile   -MR head, ECHO w/ bubble study   -Neuro consulted   -Resume zoloft and Risperdal -home meds   -Hold Jardiance metformin and Ozempic,  start hss  Ms. Andrews is a 60 y/o female from home w/ PMH of HTN ,DM, TIA, remote hx of CVA, depression and ?arrythmia- on Xarelto presents w/ Rt sided weakness and numbness staring at 8:00 am this morning with spontaneous resolution of her symptoms in next 15-30 minutes. Admitted under observation unit  for TIA     A/P:  #TIA  #HTN  #DM  #Depression   #Arrythmia- ?PAF  #Remote hx of CVA  #DVT ppx     Plan:  -Patient p/w RT sided weakness which is now resolved. NIHS score 0 on my evaluation   -CT head and CTA head and neck- grossly unremarkable.  -CT perfusion study- demonstrates delayed mean transit time in the right frontal lobe. Small acute ischemic penumbra not excluded.    -Appears to be TIA, ABCD2 score-5 points- should ideally be on DAPT but patient on Xarelto; unclear if she has Afib as patient not fully aware and she takes 15mg OD instead of 20mg OD  Resume Xarelto, dose changed to 20mg. Primary team to reach out to her out-patient cardiologist Dr. Zan Lane to discuss further and clarify indication.   -Start high intensity statin.  -C/w permissive HTN for first 24 hrs, patient already took am BP meds today. Meds resumed for tmrw   -C/w tele, check Hba1C, lipid profile   -MR head, ECHO w/ bubble study   -Neuro consulted - Dr. Sullivan via TEAMS   -Resume zoloft and Risperdal -home meds   -Hold Jardiance metformin and Ozempic,  start hss  Ms. Andrews is a 58 y/o female from home w/ PMH of HTN ,DM, TIA, remote hx of CVA, depression and ?arrythmia- on Xarelto presents w/ Rt sided weakness and numbness staring at 8:00 am this morning with spontaneous resolution of her symptoms in next 15-30 minutes. Admitted under observation unit  for TIA     A/P:  #TIA  #HTN  #DM  #Depression   #Arrythmia- ?PAF  #Remote hx of CVA  #Sleep apnea- on CPAP  #DVT ppx     Plan:  -Patient p/w RT sided weakness which is now resolved. NIHS score 0 on my evaluation   -CT head and CTA head and neck- grossly unremarkable.  -CT perfusion study- demonstrates delayed mean transit time in the right frontal lobe. Small acute ischemic penumbra not excluded.    -Appears to be TIA, ABCD2 score-5 points- should ideally be on DAPT but patient on Xarelto; unclear if she has Afib as patient not fully aware and she takes 15mg OD instead of 20mg OD  Resume Xarelto, dose changed to 20mg. Primary team to reach out to her out-patient cardiologist Dr. Zan Lane to discuss further and clarify indication.   -Start high intensity statin.  -C/w permissive HTN for first 24 hrs, patient already took am BP meds today. Meds resumed for tmrw   -C/w tele, check Hba1C, lipid profile   -MR head, ECHO w/ bubble study   -Neuro consulted - Dr. Sullivan via TEAMS   -Resume zoloft and Risperdal -home meds   -Hold Jardiance metformin and Ozempic,  start hss

## 2024-09-15 NOTE — ED ADULT TRIAGE NOTE - CHIEF COMPLAINT QUOTE
Patient presents to ED c/o right side numbness that has since subsided and high blood pressure started this morning. /102 at work. BP in triage 166/77 took Atenolol Aldactone and Losartan about 30 minutes ago.   hx HTN, DM depression TIA on xarelto

## 2024-09-15 NOTE — ED ADULT NURSE NOTE - OBJECTIVE STATEMENT
59 year old female hx HTN DM TIA and depression presents to ED c/o right hand numbness and high blood pressure while at work around 840 am. Patient states  numbness subsided shortly after taking medications atenolol losartan and aldactone. Denies any chest pain or SOB at this time.

## 2024-09-15 NOTE — ED PROVIDER NOTE - CRITICAL CARE ATTENDING CONTRIBUTION TO CARE
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 NIH score was zero discussed with radiologist and Dr. Sullivan neurologist ct angio head/chest no LVO Pt is not a candidate for TNK and throbectomy repeat NIH score remains zero.

## 2024-09-15 NOTE — ED PROVIDER NOTE - ACTIVATED STROKE TEAM
Patient calling stating she received an automated call stating she is due for an appt - patient had MWV in April of this year and wants to know what it is she is due for - please advise -    Yes

## 2024-09-15 NOTE — H&P ADULT - HISTORY OF PRESENT ILLNESS
Ms. Andrews is a 60 y/o female from home w/ PMH of HTN ,DM, TIA, remote hx of CVA, depression and ?arrythmia- on Xarelto presents w/ Rt sided weakness and numbness staring at 8:00 am this morning. Patient reports that she felt that her RUE and LE were weak, numb and felt heavy. She got her BP checked which was 190/102, symptoms lasted for 15-30 minutes and resolved spontaneously. She also c/o blurry vision during this episode which has also resolved.  Patient denies any chest pain, palpitations sob or other complaints.  She reports being complaint to her medications She is unsure why her cardiologist started her on Xarelto but says that she had palpitations and was placed on an ILR which is now removed. She is unsure if she had Afib. Her cardiologist is Dr. Lane     In ED, patient's initial VS showed- Temp 98F, HR 73,  /77,  RR 16,  SPO2 100%     Code stroke was activated, her NIHSS on arrival was 0. CT head and CTA head n neck was grossly unremarkable.  Ms. Andrews is a 60 y/o female from home w/ PMH of HTN ,DM, TIA, remote hx of CVA, depression and ?arrythmia- on Xarelto, sleep apnea on CPAP at night,  presents w/ Rt sided weakness and numbness staring at 8:00 am this morning. Patient reports that she felt that her RUE and LE were weak, numb and felt heavy. She got her BP checked which was 190/102, symptoms lasted for 15-30 minutes and resolved spontaneously. She also c/o blurry vision during this episode which has also resolved.  Patient denies any chest pain, palpitations sob or other complaints.  She reports being complaint to her medications She is unsure why her cardiologist started her on Xarelto but says that she had palpitations and was placed on an ILR which is now removed. She is unsure if she had Afib. Her cardiologist is Dr. Lane     In ED, patient's initial VS showed- Temp 98F, HR 73,  /77,  RR 16,  SPO2 100%     Code stroke was activated, her NIHSS on arrival was 0. CT head and CTA head n neck was grossly unremarkable.

## 2024-09-15 NOTE — ED PROVIDER NOTE - CRANIAL NERVE AND PUPILLARY EXAM
cranial nerves 2-12 intact/central and peripheral vision intact/peripheral vision intact/extra-ocular movements intact/gag reflex intact/tongue is midline/TONGUE DEVIATION

## 2024-09-15 NOTE — PATIENT PROFILE ADULT - NSPROGENSOURCEINFO_GEN_A_NUR
Medical Excuse    To whom it may concern:    Joyce Suazo is a patient currently under my care.   Joyce may return to work on January 21, 2018 under no restrictions. If you have any questions regarding my care, please do not hesitate in contacting my office. Thank you.    Sincerely,          Cari Khalil MD     Signatures   Electronically signed by : CARI KHALIL M.D.; Jan 16 2018  9:40AM CST    
patient

## 2024-09-15 NOTE — H&P ADULT - NSHPPHYSICALEXAM_GEN_ALL_CORE
Vital Signs Last 24 Hrs  T(C): 37.2 (15 Sep 2024 08:38), Max: 37.2 (15 Sep 2024 08:38)  T(F): 98.9 (15 Sep 2024 08:38), Max: 98.9 (15 Sep 2024 08:38)  HR: 68 (15 Sep 2024 10:36) (68 - 73)  BP: 183/105 (15 Sep 2024 10:36) (166/77 - 183/105)  BP(mean): --  RR: 16 (15 Sep 2024 08:38) (16 - 16)  SpO2: 100% (15 Sep 2024 08:38) (100% - 100%)    Parameters below as of 15 Sep 2024 08:38  Patient On (Oxygen Delivery Method): room air    CONSTITUTIONAL: Well appearing, well nourished, awake, alert and in no apparent distress  ENMT: Airway patent, Nasal mucosa clear. Mouth with normal mucosa. Throat has no vesicles, no oropharyngeal exudates and uvula is midline.  EYES: Clear bilaterally, pupils equal, round and reactive to light. EOMI.  CARDIAC: Normal rate, regular rhythm.  Heart sounds S1, S2.  No murmurs, rubs or gallops   RESPIRATORY: Breath sounds clear and equal bilaterally. No wheezes, rales or rhonchi  GI: abd soft, non tender   MUSCULOSKELETAL: Spine appears normal, range of motion is not limited, no muscle or joint tenderness  EXTREMITIES: No edema, cyanosis or deformity   NEUROLOGICAL: Alert and oriented, no focal deficits, no motor or sensory deficits.  SKIN: No rash, skin turgor

## 2024-09-15 NOTE — ED PROVIDER NOTE - NEUROLOGICAL DEEP TENDON REFLEXES
MD NOTE  Called pt x 2 with no answer     Please call in am .   Ok to stay on low dose citalopram and NOT start sertraline. New dose (5mg of citalopram) should be at \"steady state\" in 3 weeks. If still very fatigued at that time should try another agent.      normal

## 2024-09-15 NOTE — PATIENT PROFILE ADULT - LIVING ENVIRONMENT
Incoming fax states that Carlita Hernandez is approved for 6 months. Approved: Cambia 50mg Powder for [employer] where available. #9/30 days dosage and frequency as prescribed.      Faxed to referral team no

## 2024-09-15 NOTE — ED PROVIDER NOTE - EYES, MLM
Jefry Corley,    We checked with our pharmacy team, there is no DDI between the Belsomra and the Eribulin should you want to make any dose changes for Lilia.    Thank you,  BERTIN Morales Rachel A, Carmen Encinas RN7 days ago     RH  Jefry Morales,    There is no DDI with Belsomra/Eribulin.    Thank you,  Carmen Aiken RN Hamilton, Rachel A, PHARMD8 days ago     AS  Jefry Chamberlain,    I'm not seeing that this patient is on Ribociclib either.  I believe she is only on Eribulin.  Any contraindications for the belsomra while on Eribulin?    Thank you,  BERTIN Morales Ashley J, RN Qamar,R Onc Stl Nurse [facility]12 days ago     AS  Response from pharmacy:    Cintia Olvera PHARMD Segal, Lisa M, RN; [facility]; PEDRO HerreraR Onc St Nurse [facility]  Replies will be sent to [facility]  Jefry Castle,    There is no report available specifically addressing Belsomra/ribociclib interaction but based on available data with CY inhibitors 10 mg has been suggested as the maximum dose of Belsomra. Because it is evident that the 10 mg dose is ineffective for Lilia, I support increasing Belsomra from 10 mg to 15 mg and reassessing tolerability. An alternative option would be to utilize the lowest effective dose of melatonin.    Of note, I am not seeing a record of a ribociclib prescription or documentation that Lilia is receiving it from the . Please confirm that the patient is currently taking the medication.    If she is not taking ribociclib but will be starting shortly, the starting dose of Belsomra should be 5 mg and titrated up to 10 mg if 5 mg nightly is not effective.    Please let me know if you have any other questions.    Thank you,  Cintia Olvera PharmD      Clear bilaterally, pupils equal, round and reactive to light. no sclera icterus no photophobia bilaterally

## 2024-09-15 NOTE — PATIENT PROFILE ADULT - NSPROIMPLANTSMEDDEV_GEN_A_NUR
Message left on voice mail to call office. Attempting to reach patient via phone for consultation appt with Dr. April Levy. None

## 2024-09-15 NOTE — CONSULT NOTE ADULT - ASSESSMENT
Probable complex migraine  Possible TIA  Hx of stroke  HTN  DM2    - continue Xarelto and statin for possible stroke prevention  - MRI brain pending  - follow up in our office for management of migraines    Thank you for the consult.

## 2024-09-15 NOTE — ED PROVIDER NOTE - PROGRESS NOTE DETAILS
Pt is alert and oriented x 3 NIH score is zero pt is not a candidate for TNK due to NIH score is zero. discussed the case with the radiologist and sts pt's non contrast ct head no acute finding. Pt remains alert and oriented x 3 ct angio head/neck no LVO NIH score remains zero. discussed with Dr. Nieto hospitalist and admit pt

## 2024-09-15 NOTE — ED PROVIDER NOTE - OBJECTIVE STATEMENT
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 Pt c/o light headed, right side upper and lower extremities numbness and feeling heavy started at 8:00 am this morning and sts the symptoms lasted about 10 minutes and Now pt sts the symptoms are completely resolved. Pt sts she has a hx of left brain infarct without residual weakness or numbness takes Xarelto 15 mg qhs last dose was last night. Pt also sts he bp was 192/102 at the time. Now pt denies headache, dizziness, blurred visions, light sensitivities, focal/distal weakness or numbness, neck/back/hips/calfs pain., cough, sob, chest pain, nausea, vomiting, fever, chills, abd pain, dysuria, or irregular bowel movements.

## 2024-09-16 ENCOUNTER — TRANSCRIPTION ENCOUNTER (OUTPATIENT)
Age: 59
End: 2024-09-16

## 2024-09-16 VITALS
HEART RATE: 84 BPM | RESPIRATION RATE: 18 BRPM | DIASTOLIC BLOOD PRESSURE: 77 MMHG | OXYGEN SATURATION: 96 % | TEMPERATURE: 99 F | SYSTOLIC BLOOD PRESSURE: 114 MMHG

## 2024-09-16 LAB
A1C WITH ESTIMATED AVERAGE GLUCOSE RESULT: 6.7 % — HIGH (ref 4–5.6)
ALBUMIN SERPL ELPH-MCNC: 3.3 G/DL — SIGNIFICANT CHANGE UP (ref 3.3–5)
ALP SERPL-CCNC: 38 U/L — LOW (ref 40–120)
ALT FLD-CCNC: 18 U/L — SIGNIFICANT CHANGE UP (ref 12–78)
ANION GAP SERPL CALC-SCNC: 6 MMOL/L — SIGNIFICANT CHANGE UP (ref 5–17)
ANISOCYTOSIS BLD QL: SLIGHT — SIGNIFICANT CHANGE UP
AST SERPL-CCNC: 9 U/L — LOW (ref 15–37)
BASOPHILS # BLD AUTO: 0.03 K/UL — SIGNIFICANT CHANGE UP (ref 0–0.2)
BASOPHILS NFR BLD AUTO: 0.5 % — SIGNIFICANT CHANGE UP (ref 0–2)
BILIRUB SERPL-MCNC: 0.3 MG/DL — SIGNIFICANT CHANGE UP (ref 0.2–1.2)
BUN SERPL-MCNC: 12 MG/DL — SIGNIFICANT CHANGE UP (ref 7–23)
CALCIUM SERPL-MCNC: 8.7 MG/DL — SIGNIFICANT CHANGE UP (ref 8.5–10.1)
CHLORIDE SERPL-SCNC: 107 MMOL/L — SIGNIFICANT CHANGE UP (ref 96–108)
CHOLEST SERPL-MCNC: 94 MG/DL — SIGNIFICANT CHANGE UP
CO2 SERPL-SCNC: 28 MMOL/L — SIGNIFICANT CHANGE UP (ref 22–31)
CREAT SERPL-MCNC: 0.61 MG/DL — SIGNIFICANT CHANGE UP (ref 0.5–1.3)
EGFR: 104 ML/MIN/1.73M2 — SIGNIFICANT CHANGE UP
ELLIPTOCYTES BLD QL SMEAR: SLIGHT — SIGNIFICANT CHANGE UP
EOSINOPHIL # BLD AUTO: 0.11 K/UL — SIGNIFICANT CHANGE UP (ref 0–0.5)
EOSINOPHIL NFR BLD AUTO: 1.8 % — SIGNIFICANT CHANGE UP (ref 0–6)
ESTIMATED AVERAGE GLUCOSE: 146 MG/DL — HIGH (ref 68–114)
GLUCOSE BLDC GLUCOMTR-MCNC: 115 MG/DL — HIGH (ref 70–99)
GLUCOSE BLDC GLUCOMTR-MCNC: 130 MG/DL — HIGH (ref 70–99)
GLUCOSE SERPL-MCNC: 108 MG/DL — HIGH (ref 70–99)
HCT VFR BLD CALC: 37.2 % — SIGNIFICANT CHANGE UP (ref 34.5–45)
HDLC SERPL-MCNC: 40 MG/DL — LOW
HGB BLD-MCNC: 11.2 G/DL — LOW (ref 11.5–15.5)
IMM GRANULOCYTES NFR BLD AUTO: 0.3 % — SIGNIFICANT CHANGE UP (ref 0–0.9)
LIPID PNL WITH DIRECT LDL SERPL: 40 MG/DL — SIGNIFICANT CHANGE UP
LYMPHOCYTES # BLD AUTO: 2.54 K/UL — SIGNIFICANT CHANGE UP (ref 1–3.3)
LYMPHOCYTES # BLD AUTO: 42.7 % — SIGNIFICANT CHANGE UP (ref 13–44)
MACROCYTES BLD QL: SLIGHT — SIGNIFICANT CHANGE UP
MANUAL SMEAR VERIFICATION: SIGNIFICANT CHANGE UP
MCHC RBC-ENTMCNC: 22.5 PG — LOW (ref 27–34)
MCHC RBC-ENTMCNC: 30.1 G/DL — LOW (ref 32–36)
MCV RBC AUTO: 74.8 FL — LOW (ref 80–100)
MICROCYTES BLD QL: SLIGHT — SIGNIFICANT CHANGE UP
MONOCYTES # BLD AUTO: 0.48 K/UL — SIGNIFICANT CHANGE UP (ref 0–0.9)
MONOCYTES NFR BLD AUTO: 8.1 % — SIGNIFICANT CHANGE UP (ref 2–14)
NEUTROPHILS # BLD AUTO: 2.77 K/UL — SIGNIFICANT CHANGE UP (ref 1.8–7.4)
NEUTROPHILS NFR BLD AUTO: 46.6 % — SIGNIFICANT CHANGE UP (ref 43–77)
NON HDL CHOLESTEROL: 54 MG/DL — SIGNIFICANT CHANGE UP
NRBC # BLD: 0 /100 WBCS — SIGNIFICANT CHANGE UP (ref 0–0)
OVALOCYTES BLD QL SMEAR: SLIGHT — SIGNIFICANT CHANGE UP
PLAT MORPH BLD: NORMAL — SIGNIFICANT CHANGE UP
PLATELET # BLD AUTO: 184 K/UL — SIGNIFICANT CHANGE UP (ref 150–400)
POTASSIUM SERPL-MCNC: 3.9 MMOL/L — SIGNIFICANT CHANGE UP (ref 3.5–5.3)
POTASSIUM SERPL-SCNC: 3.9 MMOL/L — SIGNIFICANT CHANGE UP (ref 3.5–5.3)
PROT SERPL-MCNC: 7.1 GM/DL — SIGNIFICANT CHANGE UP (ref 6–8.3)
RBC # BLD: 4.97 M/UL — SIGNIFICANT CHANGE UP (ref 3.8–5.2)
RBC # FLD: 15.6 % — HIGH (ref 10.3–14.5)
RBC BLD AUTO: ABNORMAL
SODIUM SERPL-SCNC: 141 MMOL/L — SIGNIFICANT CHANGE UP (ref 135–145)
TRIGL SERPL-MCNC: 60 MG/DL — SIGNIFICANT CHANGE UP
WBC # BLD: 5.95 K/UL — SIGNIFICANT CHANGE UP (ref 3.8–10.5)
WBC # FLD AUTO: 5.95 K/UL — SIGNIFICANT CHANGE UP (ref 3.8–10.5)

## 2024-09-16 PROCEDURE — 70551 MRI BRAIN STEM W/O DYE: CPT | Mod: 26,MC

## 2024-09-16 PROCEDURE — 99239 HOSP IP/OBS DSCHRG MGMT >30: CPT

## 2024-09-16 RX ORDER — ATENOLOL 100 MG
1 TABLET ORAL
Refills: 0 | DISCHARGE

## 2024-09-16 RX ORDER — FOLIC ACID 1 MG
1 TABLET ORAL
Qty: 0 | Refills: 0 | DISCHARGE
Start: 2024-09-16

## 2024-09-16 RX ORDER — SERTRALINE HYDROCHLORIDE 50 MG/1
1 TABLET, FILM COATED ORAL
Qty: 0 | Refills: 0 | DISCHARGE
Start: 2024-09-16

## 2024-09-16 RX ORDER — ATENOLOL 100 MG
1 TABLET ORAL
Qty: 0 | Refills: 0 | DISCHARGE
Start: 2024-09-16

## 2024-09-16 RX ORDER — RISPERIDONE 0.25 MG/1
1 TABLET, FILM COATED ORAL
Qty: 0 | Refills: 0 | DISCHARGE
Start: 2024-09-16

## 2024-09-16 RX ORDER — DIVALPROEX SODIUM 125 MG/1
2 CAPSULE, DELAYED RELEASE ORAL
Qty: 0 | Refills: 0 | DISCHARGE
Start: 2024-09-16

## 2024-09-16 RX ORDER — SPIRONOLACTONE 25 MG/1
2 TABLET, FILM COATED ORAL
Qty: 0 | Refills: 0 | DISCHARGE
Start: 2024-09-16

## 2024-09-16 RX ORDER — LOSARTAN POTASSIUM 50 MG/1
1 TABLET ORAL
Qty: 0 | Refills: 0 | DISCHARGE
Start: 2024-09-16

## 2024-09-16 RX ADMIN — Medication 100 MILLIGRAM(S): at 05:51

## 2024-09-16 RX ADMIN — SERTRALINE HYDROCHLORIDE 50 MILLIGRAM(S): 50 TABLET, FILM COATED ORAL at 11:29

## 2024-09-16 RX ADMIN — LOSARTAN POTASSIUM 50 MILLIGRAM(S): 50 TABLET ORAL at 05:53

## 2024-09-16 RX ADMIN — SPIRONOLACTONE 50 MILLIGRAM(S): 25 TABLET, FILM COATED ORAL at 05:44

## 2024-09-16 RX ADMIN — Medication 1 MILLIGRAM(S): at 11:30

## 2024-09-16 NOTE — DISCHARGE NOTE PROVIDER - NSDCCPCAREPLAN_GEN_ALL_CORE_FT
PRINCIPAL DISCHARGE DIAGNOSIS  Diagnosis: Brain TIA  Assessment and Plan of Treatment: You were admitted due to Rt sided weakness and numbness with spontaneous resolutionsymptoms and admitted under observation unit  for TIA.   MR head- negative for acute infarction. Please follow up with the neurologist and take your medications as prescribed.

## 2024-09-16 NOTE — DISCHARGE NOTE NURSING/CASE MANAGEMENT/SOCIAL WORK - NSDCPEFALRISK_GEN_ALL_CORE
For information on Fall & Injury Prevention, visit: [email or web address] OR  [email or web address] OR  https://www.cdc.gov/steadi/patient.html

## 2024-09-16 NOTE — DISCHARGE NOTE PROVIDER - CARE PROVIDER_API CALL
Mj Chakraborty)  Internal Medicine  [address]  [place] [address]  Phone: [phone]  Fax: [phone]  Established Patient  Follow Up Time: Routine    Thu Sullivan  Neurology  [facility] [place] [address]  Phone: [phone]  Fax: [phone]  Follow Up Time: 4-6 Days

## 2024-09-16 NOTE — DISCHARGE NOTE PROVIDER - PROVIDER TOKENS
PROVIDER:[TOKEN:[[id number]],FOLLOWUP:[Routine],ESTABLISHEDPATIENT:[T]],PROVIDER:[TOKEN:[[id number]],FOLLOWUP:[4-6 Days]]

## 2024-09-16 NOTE — CHART NOTE - NSCHARTNOTEFT_GEN_A_CORE
Work Letter     To Whom It May Concern,    Sita Francois was admitted on 9/15/2024 at [facility] and was discharged from [facility] on 9/16/2024. Patient may return to work with further clearance from PCP if required.   Any further questions or concerns please use contact info below.      Dayo Gregorio PA-C  Internal Medicine  [address] [place] [id number] [phone]

## 2024-09-16 NOTE — DISCHARGE NOTE PROVIDER - NSDCMRMEDTOKEN_GEN_ALL_CORE_FT
Aldactone 50 mg oral tablet: 1 tab(s) orally once a day  atenolol 100 mg oral tablet: 1 tab(s) orally once a day  Depakote  mg oral tablet, extended release: 2 tab(s) orally once a day (at bedtime)  ferrous sulfate 324 mg (65 mg elemental iron) oral tablet: 1 tab(s) orally once a day  folic acid 1 mg oral tablet: 1 tab(s) orally once a day  Jardiance 10 mg oral tablet: 1 tab(s) orally  losartan 50 mg oral tablet: 1 tab(s) orally once a day  metFORMIN 500 mg oral tablet: 1 tab(s) orally 2 times a day  Multiple Vitamins oral tablet: 1 tab(s) orally once a day  Ozempic 2 mg/1.5 mL (0.25 mg or 0.5 mg dose) subcutaneous solution: 0.5 milligram(s) subcutaneously once a week  RisperDAL 0.5 mg oral tablet: 1 tab(s) orally once a day (at bedtime)  simvastatin 20 mg oral tablet: 1 tab(s) orally once a day  Xarelto 15 mg oral tablet: 1 tab(s) orally once a day (in the evening)  Zoloft 50 mg oral tablet: 1 tab(s) orally once a day   atenolol 100 mg oral tablet: 1 tab(s) orally once a day  atorvastatin 40 mg oral tablet: 1 tab(s) orally once a day (at bedtime)  divalproex sodium 500 mg oral tablet, extended release: 2 tab(s) orally once a day (at bedtime)  ferrous sulfate 324 mg (65 mg elemental iron) oral tablet: 1 tab(s) orally once a day  folic acid 1 mg oral tablet: 1 tab(s) orally once a day  Jardiance 10 mg oral tablet: 1 tab(s) orally once a day  losartan 50 mg oral tablet: 1 tab(s) orally once a day  metFORMIN 500 mg oral tablet: 1 tab(s) orally 2 times a day  Multiple Vitamins oral tablet: 1 tab(s) orally once a day  Ozempic 2 mg/1.5 mL (0.25 mg or 0.5 mg dose) subcutaneous solution: 0.5 milligram(s) subcutaneously once a week  risperiDONE 0.5 mg oral tablet: 1 tab(s) orally once a day (at bedtime)  sertraline 50 mg oral tablet: 1 tab(s) orally once a day  spironolactone 25 mg oral tablet: 2 tab(s) orally once a day  Xarelto 15 mg oral tablet: 1 tab(s) orally once a day (in the evening)

## 2024-09-16 NOTE — DISCHARGE NOTE NURSING/CASE MANAGEMENT/SOCIAL WORK - PATIENT PORTAL LINK FT
You can access the FollowMyHealth Patient Portal offered by [facility] by registering at the following website: [email or web address]. By joining [facility]’s FollowMyHealth portal, you will also be able to view your health information using other applications (apps) compatible with our system.

## 2024-09-16 NOTE — DISCHARGE NOTE PROVIDER - ATTENDING DISCHARGE PHYSICAL EXAMINATION:
Vital Signs Last 24 Hrs  T(F): 98.5 (16 Sep 2024 11:17), Max: 98.6 (15 Sep 2024 16:18)  HR: 72 (16 Sep 2024 13:02) (70 - 83)  BP: 121/79 (16 Sep 2024 11:17) (121/79 - 145/85)  RR: 18 (16 Sep 2024 11:17) (15 - 18)  SpO2: 98% (16 Sep 2024 11:17) (96% - 100%)    Physical Exam:  Constitutional: NAD, awake and alert  Respiratory: cta b/l no wheezing no rhonchi  Cardiovascular: +s1/s2 no edema b/l le  Gastrointestinal: soft nt nd bs+  Vascular: 2+ peripheral pulses  Neurological: A/O x 3, no focal deficits

## 2024-09-16 NOTE — PROGRESS NOTE ADULT - SUBJECTIVE AND OBJECTIVE BOX
Patient is a 59y old  Female who presents with a chief complaint of TIA (15 Sep 2024 19:43)    INTERVAL HPI/OVERNIGHT EVENTS: No acute events overnight. HD stable.     MEDICATIONS  (STANDING):  atenolol  Tablet 100 milliGRAM(s) Oral daily  atorvastatin 40 milliGRAM(s) Oral at bedtime  divalproex ER 1000 milliGRAM(s) Oral at bedtime  folic acid 1 milliGRAM(s) Oral daily  insulin lispro (ADMELOG) corrective regimen sliding scale   SubCutaneous three times a day before meals  losartan 50 milliGRAM(s) Oral daily  risperiDONE   Tablet 0.5 milliGRAM(s) Oral at bedtime  rivaroxaban 20 milliGRAM(s) Oral with dinner  sertraline 50 milliGRAM(s) Oral daily  spironolactone 50 milliGRAM(s) Oral daily    MEDICATIONS  (PRN):  aluminum hydroxide/magnesium hydroxide/simethicone Suspension 30 milliLiter(s) Oral every 4 hours PRN Dyspepsia  melatonin 3 milliGRAM(s) Oral at bedtime PRN Insomnia  ondansetron Injectable 4 milliGRAM(s) IV Push every 8 hours PRN Nausea and/or Vomiting      Allergies    Vicodin (Hives)  Vicodin (Rash)    Intolerances        REVIEW OF SYSTEMS: all negative with exception of above    Vital Signs Last 24 Hrs  T(C): 36.9 (16 Sep 2024 11:17), Max: 37 (15 Sep 2024 16:18)  T(F): 98.5 (16 Sep 2024 11:17), Max: 98.6 (15 Sep 2024 16:18)  HR: 78 (16 Sep 2024 11:17) (70 - 83)  BP: 121/79 (16 Sep 2024 11:17) (121/79 - 145/85)  BP(mean): --  RR: 18 (16 Sep 2024 11:17) (15 - 18)  SpO2: 98% (16 Sep 2024 11:17) (96% - 100%)    Parameters below as of 16 Sep 2024 11:17  Patient On (Oxygen Delivery Method): room air        PHYSICAL EXAM:  GENERAL: NAD, well-groomed  NERVOUS SYSTEM:  Alert & Oriented X3, Good concentration; Motor Strength 5/5 B/L upper and lower extremities; DTRs 2+ intact and symmetric  CHEST/LUNG: Clear to percussion bilaterally; No rales, rhonchi, wheezing, or rubs  HEART: Regular rate and rhythm; No murmurs, rubs, or gallops  ABDOMEN: Soft, Nontender, Nondistended; Bowel sounds present  EXTREMITIES:  2+ Peripheral Pulses, No clubbing, cyanosis, or edema    LABS:                        11.2   5.95  )-----------( 184      ( 16 Sep 2024 07:25 )             37.2     09-16    141  |  107  |  12  ----------------------------<  108<H>  3.9   |  28  |  0.61    Ca    8.7      16 Sep 2024 07:25    TPro  7.1  /  Alb  3.3  /  TBili  0.3  /  DBili  x   /  AST  9<L>  /  ALT  18  /  AlkPhos  38<L>  09-16    PT/INR - ( 15 Sep 2024 09:27 )   PT: 13.6 sec;   INR: 1.14 ratio         PTT - ( 15 Sep 2024 09:27 )  PTT:41.0 sec  Urinalysis Basic - ( 16 Sep 2024 07:25 )    Color: x / Appearance: x / SG: x / pH: x  Gluc: 108 mg/dL / Ketone: x  / Bili: x / Urobili: x   Blood: x / Protein: x / Nitrite: x   Leuk Esterase: x / RBC: x / WBC x   Sq Epi: x / Non Sq Epi: x / Bacteria: x      CAPILLARY BLOOD GLUCOSE      POCT Blood Glucose.: 130 mg/dL (16 Sep 2024 11:36)  POCT Blood Glucose.: 115 mg/dL (16 Sep 2024 08:03)  POCT Blood Glucose.: 112 mg/dL (15 Sep 2024 21:00)  POCT Blood Glucose.: 118 mg/dL (15 Sep 2024 17:49)      RADIOLOGY & ADDITIONAL TESTS:    Imaging Personally Reviewed:  [ ] YES  [ ] NO  < from: MR Head No Cont (09.16.24 @ 11:47) >    IMPRESSION: No acute infarction.    --- End of Report ---            CATALINO RAMEY MD  This document has been electronically signed. Sep 16 2024 11:18AM    < end of copied text >        Consultant(s) Notes Reviewed:  [ ] YES  [ ] NO    Care Discussed with Consultants/Other Providers [ ] YES  [ ] NO

## 2024-09-16 NOTE — PROGRESS NOTE ADULT - ASSESSMENT
Ms. Andrews is a 60 y/o female from home w/ PMH of HTN ,DM, TIA, remote hx of CVA, depression and ?arrythmia- on Xarelto presents w/ Rt sided weakness and numbness staring at 8:00 am this morning with spontaneous resolution of her symptoms in next 15-30 minutes. Admitted under observation unit  for TIA     A/P:  #TIA  #HTN  #DM  #Depression   #Arrythmia- ?PAF  #Remote hx of CVA  #Sleep apnea- on CPAP  #DVT ppx     Plan:  -Patient p/w RT sided weakness which is now resolved. NIHS score 0 on my evaluation   -CT head and CTA head and neck- grossly unremarkable.  -CT perfusion study- demonstrates delayed mean transit time in the right frontal lobe. Small acute ischemic penumbra not excluded.    -Appears to be TIA, ABCD2 score-5 points- c/w Xarelto  Resume Xarelto, dose changed to 20mg.   -Start high intensity statin.  -C/w permissive HTN  -C/w tele, check Hba1C, lipid profile   -MR head, ECHO w/ bubble study   -Neuro consulted - Dr. Sullivan via TEAMS   -Resume zoloft and Risperdal -home meds   -Hold Jardiance metformin and Ozempic,  start hss

## 2024-09-16 NOTE — DISCHARGE NOTE PROVIDER - HOSPITAL COURSE
Ms. Andrews is a 58 y/o female from home w/ PMH of HTN ,DM, TIA, remote hx of CVA, depression and ?arrythmia- on Xarelto presents w/ Rt sided weakness and numbness staring at 8:00 am this morning with spontaneous resolution of her symptoms in next 15-30 minutes. Admitted under observation unit  for TIA.   MR head- negative for acute infarction. Per neurology - continue Xarelto and statin for possible stroke prevention and follow up in neurology office for management of migraines.     A/P:  #TIA  #HTN  #DM  #Depression   #Arrythmia- ?PAF  #Remote hx of CVA  #Sleep apnea- on CPAP  #DVT ppx     Attending Plan:  -Patient p/w RT sided weakness which is now resolved. NIHS score 0 on my evaluation   -CT head and CTA head and neck- grossly unremarkable.  -CT perfusion study- demonstrates delayed mean transit time in the right frontal lobe. Small acute ischemic penumbra not excluded.    -Appears to be TIA, ABCD2 score-5 points- c/w Xarelto  Resume Xarelto, dose changed to 20mg.   -Start high intensity statin.  -C/w permissive HTN  -C/w tele, check Hba1C, lipid profile   -MR head, ECHO w/ bubble study   -Neuro consulted - Dr. Sullivan via TEAMS   -Resume zoloft and Risperdal -home meds   -Hold Jardiance metformin and Ozempic,  start hss         Patient medically optimized for discharge, discussed case with Dr Macias on 9/16/24 agrees with plan.

## 2024-10-02 ENCOUNTER — OFFICE (OUTPATIENT)
Dept: URBAN - METROPOLITAN AREA CLINIC 109 | Facility: CLINIC | Age: 59
Setting detail: OPHTHALMOLOGY
End: 2024-10-02
Payer: COMMERCIAL

## 2024-10-02 DIAGNOSIS — H16.221: ICD-10-CM

## 2024-10-02 DIAGNOSIS — H35.54: ICD-10-CM

## 2024-10-02 DIAGNOSIS — E11.9: ICD-10-CM

## 2024-10-02 DIAGNOSIS — H40.023: ICD-10-CM

## 2024-10-02 PROCEDURE — 92014 COMPRE OPH EXAM EST PT 1/>: CPT | Performed by: OPHTHALMOLOGY

## 2024-10-02 PROCEDURE — 92133 CPTRZD OPH DX IMG PST SGM ON: CPT | Performed by: OPHTHALMOLOGY

## 2024-10-02 PROCEDURE — 92083 EXTENDED VISUAL FIELD XM: CPT | Performed by: OPHTHALMOLOGY

## 2024-10-02 ASSESSMENT — REFRACTION_MANIFEST
OS_ADD: +2.00
OS_AXIS: 85
OD_VA1: 20/20-
OS_VA1: 20/20-
OD_ADD: +2.00
OS_CYLINDER: -0.50
OD_SPHERE: +1.25
OS_SPHERE: +1.25
OS_SPHERE: +2.75
OD_SPHERE: +2.75

## 2024-10-02 ASSESSMENT — VISUAL ACUITY
OS_BCVA: 20/30-2
OD_BCVA: 20/40-2

## 2024-10-02 ASSESSMENT — LID EXAM ASSESSMENTS
OD_MEIBOMITIS: RLL RUL 1+ 2+
OS_MEIBOMITIS: LLL LUL 1+ 2+

## 2024-10-02 ASSESSMENT — REFRACTION_CURRENTRX
OS_AXIS: 79
OS_OVR_VA: 20/
OS_CYLINDER: -0.50
OD_OVR_VA: 20/
OD_SPHERE: +2.25
OS_SPHERE: +2.25

## 2024-10-02 ASSESSMENT — REFRACTION_AUTOREFRACTION
OD_SPHERE: +1.75
OD_CYLINDER: -0.50
OS_CYLINDER: -0.75
OS_AXIS: 95
OD_AXIS: 79
OS_SPHERE: +1.75

## 2024-10-02 ASSESSMENT — PACHYMETRY
OD_CT_UM: 501
OS_CT_CORRECTION: 3
OS_CT_UM: 507
OD_CT_CORRECTION: 3

## 2024-10-02 ASSESSMENT — TONOMETRY
OD_IOP_MMHG: 20
OS_IOP_MMHG: 18

## 2024-10-02 ASSESSMENT — CONFRONTATIONAL VISUAL FIELD TEST (CVF)
OS_FINDINGS: FULL
OD_FINDINGS: FULL

## 2024-11-07 NOTE — H&P ADULT - REASON FOR ADMISSION
Chelsey Alarcon needs to be seen for an appointment before further refills are given.   right sided numbness.

## 2024-12-07 ENCOUNTER — EMERGENCY (EMERGENCY)
Facility: HOSPITAL | Age: 59
LOS: 0 days | Discharge: HOME | End: 2024-12-07
Attending: STUDENT IN AN ORGANIZED HEALTH CARE EDUCATION/TRAINING PROGRAM
Payer: COMMERCIAL

## 2024-12-07 VITALS
DIASTOLIC BLOOD PRESSURE: 87 MMHG | TEMPERATURE: 98 F | SYSTOLIC BLOOD PRESSURE: 148 MMHG | RESPIRATION RATE: 20 BRPM | OXYGEN SATURATION: 98 % | HEART RATE: 80 BPM

## 2024-12-07 VITALS
HEIGHT: 66 IN | DIASTOLIC BLOOD PRESSURE: 93 MMHG | RESPIRATION RATE: 20 BRPM | OXYGEN SATURATION: 97 % | WEIGHT: 177.91 LBS | HEART RATE: 88 BPM | SYSTOLIC BLOOD PRESSURE: 158 MMHG | TEMPERATURE: 100 F

## 2024-12-07 DIAGNOSIS — M25.551 PAIN IN RIGHT HIP: ICD-10-CM

## 2024-12-07 DIAGNOSIS — M54.16 RADICULOPATHY, LUMBAR REGION: ICD-10-CM

## 2024-12-07 DIAGNOSIS — Z88.5 ALLERGY STATUS TO NARCOTIC AGENT: ICD-10-CM

## 2024-12-07 DIAGNOSIS — Z86.73 PERSONAL HISTORY OF TRANSIENT ISCHEMIC ATTACK (TIA), AND CEREBRAL INFARCTION WITHOUT RESIDUAL DEFICITS: ICD-10-CM

## 2024-12-07 DIAGNOSIS — Z90.710 ACQUIRED ABSENCE OF BOTH CERVIX AND UTERUS: Chronic | ICD-10-CM

## 2024-12-07 DIAGNOSIS — M43.16 SPONDYLOLISTHESIS, LUMBAR REGION: ICD-10-CM

## 2024-12-07 DIAGNOSIS — I10 ESSENTIAL (PRIMARY) HYPERTENSION: ICD-10-CM

## 2024-12-07 DIAGNOSIS — E11.9 TYPE 2 DIABETES MELLITUS WITHOUT COMPLICATIONS: ICD-10-CM

## 2024-12-07 PROCEDURE — 72100 X-RAY EXAM L-S SPINE 2/3 VWS: CPT | Mod: 26

## 2024-12-07 PROCEDURE — 73502 X-RAY EXAM HIP UNI 2-3 VIEWS: CPT | Mod: 26,RT

## 2024-12-07 PROCEDURE — 99284 EMERGENCY DEPT VISIT MOD MDM: CPT

## 2024-12-07 RX ORDER — KETOROLAC TROMETHAMINE 30 MG/ML
60 INJECTION, SOLUTION INTRAMUSCULAR; INTRAVENOUS ONCE
Refills: 0 | Status: DISCONTINUED | OUTPATIENT
Start: 2024-12-07 | End: 2024-12-07

## 2024-12-07 RX ORDER — PREDNISONE 20 MG/1
60 TABLET ORAL ONCE
Refills: 0 | Status: COMPLETED | OUTPATIENT
Start: 2024-12-07 | End: 2024-12-07

## 2024-12-07 RX ORDER — PREDNISONE 20 MG/1
3 TABLET ORAL
Qty: 12 | Refills: 0
Start: 2024-12-07 | End: 2024-12-10

## 2024-12-07 RX ORDER — LIDOCAINE HYDROCHLORIDE 20 MG/ML
1 JELLY TOPICAL ONCE
Refills: 0 | Status: COMPLETED | OUTPATIENT
Start: 2024-12-07 | End: 2024-12-07

## 2024-12-07 RX ORDER — LIDOCAINE HYDROCHLORIDE 20 MG/ML
1 JELLY TOPICAL
Qty: 2 | Refills: 0
Start: 2024-12-07 | End: 2024-12-16

## 2024-12-07 RX ADMIN — KETOROLAC TROMETHAMINE 60 MILLIGRAM(S): 30 INJECTION, SOLUTION INTRAMUSCULAR; INTRAVENOUS at 17:55

## 2024-12-07 RX ADMIN — LIDOCAINE HYDROCHLORIDE 1 PATCH: 20 JELLY TOPICAL at 17:52

## 2024-12-07 RX ADMIN — PREDNISONE 60 MILLIGRAM(S): 20 TABLET ORAL at 17:51

## 2024-12-11 ENCOUNTER — OFFICE (OUTPATIENT)
Dept: URBAN - METROPOLITAN AREA CLINIC 109 | Facility: CLINIC | Age: 59
Setting detail: OPHTHALMOLOGY
End: 2024-12-11
Payer: COMMERCIAL

## 2024-12-11 DIAGNOSIS — H40.023: ICD-10-CM

## 2024-12-11 PROCEDURE — 99212 OFFICE O/P EST SF 10 MIN: CPT | Performed by: OPHTHALMOLOGY

## 2024-12-11 ASSESSMENT — PACHYMETRY
OS_CT_UM: 507
OS_CT_CORRECTION: 3
OD_CT_UM: 501
OD_CT_CORRECTION: 3

## 2024-12-11 ASSESSMENT — VISUAL ACUITY
OD_BCVA: 20/25-
OS_BCVA: 20/25-2

## 2024-12-11 ASSESSMENT — REFRACTION_AUTOREFRACTION
OS_AXIS: 087
OD_SPHERE: +1.75
OS_CYLINDER: -0.75
OS_SPHERE: +1.75
OD_AXIS: 076
OD_CYLINDER: -0.25

## 2024-12-11 ASSESSMENT — REFRACTION_CURRENTRX
OS_SPHERE: +2.25
OS_OVR_VA: 20/
OD_SPHERE: +2.25
OS_AXIS: 79
OS_CYLINDER: -0.50
OD_OVR_VA: 20/

## 2024-12-11 ASSESSMENT — REFRACTION_MANIFEST
OD_ADD: +2.00
OS_VA1: 20/20-
OS_CYLINDER: -0.50
OS_SPHERE: +2.75
OS_ADD: +2.00
OD_SPHERE: +1.25
OD_VA1: 20/20-
OS_AXIS: 85
OD_SPHERE: +2.75
OS_SPHERE: +1.25

## 2024-12-11 ASSESSMENT — TONOMETRY
OD_IOP_MMHG: 18
OS_IOP_MMHG: 20
OS_IOP_MMHG: 16
OD_IOP_MMHG: 14

## 2024-12-11 ASSESSMENT — LID EXAM ASSESSMENTS
OD_MEIBOMITIS: RLL RUL 1+ 2+
OS_MEIBOMITIS: LLL LUL 1+ 2+

## 2024-12-13 ENCOUNTER — APPOINTMENT (OUTPATIENT)
Dept: ORTHOPEDIC SURGERY | Facility: CLINIC | Age: 59
End: 2024-12-13

## 2024-12-24 ENCOUNTER — APPOINTMENT (OUTPATIENT)
Dept: ORTHOPEDIC SURGERY | Facility: CLINIC | Age: 59
End: 2024-12-24

## 2025-02-20 NOTE — DISCHARGE NOTE NURSING/CASE MANAGEMENT/SOCIAL WORK - NSDCPEPTSTROKESIGNS_GEN_ALL_CORE
Sudden numbness or weakness of the face, arm, or leg, especially on one side of the body. Confusion, trouble speaking or understanding. Trouble seeing in one or both eyes. Trouble walking, dizziness, loss of balance or coordination. Severe headache. 4 = No assist / stand by assistance

## 2025-02-27 NOTE — ED PROVIDER NOTE - PRINCIPAL DIAGNOSIS
DIAGNOSES:    Lumbosacral radiculopathy, M54.16.  Lumbosacral spondylosis, M47.816.  Right knee DJD and pain, M25.561, M17.11.     HISTORY: The patient is here for follow up.  Her symptoms are as below.  There are no bowel or bladder symptoms.     PAST MEDICAL HISTORY:    History - past medical           Past Medical History:   Diagnosis Date    Chronic cholecystitis 2015    Esophageal reflux      Fracture      High cholesterol      S/P laparoscopic cholecystectomy 2015    Unspecified essential hypertension         .     PAST SURGICAL HISTORY:    History - past surgical             Past Surgical History:   Procedure Laterality Date    Can't find surg/proc         R knee surgery     delivery only         C/S x2    Colonoscopy diagnostic   2005     Wnl per patient    Knee scope,diagnostic Right      Removal gallbladder           .     FAMILY HISTORY: Non contributory.     SOCIAL HISTORY:  Denies smoking, drinking, or drugs.     ALLERGIES:              ALLERGIES:   Allergen Reactions    Seasonal Other (See Comments)       rhinitis    Adhesive   (Environmental) RASH    Cephalosporins Other (See Comments)       Unknown    Clarithromycin Nausea & Vomiting   .     MEDICATIONS:   Current medications               Current Outpatient Medications   Medication Sig Dispense Refill    azithromycin (ZITHROMAX) 250 MG tablet 2 tabs today, then 1 tab daily 6 tablet 0    ondansetron (ZOFRAN ODT) 4 MG disintegrating tablet Place 1 tablet onto the tongue every 12 hours as needed for Nausea. 14 tablet 0    hydroCHLOROthiazide (HYDRODIURIL) 25 MG tablet Take 1 tablet by mouth every morning. 90 tablet 2    amLODIPine (NORVASC) 10 MG tablet Take 1 tablet by mouth daily. 90 tablet 3    losartan (COZAAR) 100 MG tablet Take 1 tablet by mouth daily. 90 tablet 3    fluticasone (FLONASE) 50 MCG/ACT nasal spray SHAKE LIQUID AND USE 2 SPRAYS IN EACH NOSTRIL DAILY 48 g 3    atorvastatin (LIPITOR) 40 MG tablet Take 1 tablet  by mouth daily. 90 tablet 3    carvedilol (COREG) 12.5 MG tablet TAKE 1 TABLET BY MOUTH EVERY 12 HOURS 180 tablet 0    celecoxib (CeleBREX) 100 MG capsule Take 100 mg by mouth in the morning and 100 mg in the evening.        diclofenac (VOLTAREN) 1 % gel APPLY 2 GRAMS TO THE AFFECTED AREA ON THE SKIN FOUR TIMES DAILY        hydrALAZINE (APRESOLINE) 50 MG tablet Take 1 tablet by mouth in the morning and 1 tablet at noon and 1 tablet in the evening. 270 tablet 3    pantoprazole (PROTONIX) 40 MG tablet TAKE 1 TABLET BY MOUTH EVERY DAY BEFORE A MEAL. 90 tablet 3    Multiple Vitamins-Minerals (vitamin - therapeutic multivitamins w/minerals) tablet Take 1 tablet by mouth.        albuterol 108 (90 Base) MCG/ACT inhaler 1-2 puffs by mouth every 4-6 hours as needed for wheezing. 1 Inhaler 0    famotidine (PEPCID) 20 MG tablet Take 20 mg by mouth 2 times daily.          No current facility-administered medications for this encounter.       .     REVIEW OF SYSTEMS:  GEN: NAD. No fever.  HEAD: No headaches, vertigo.  EYES: Normal vision, no diplopia, no pain.  EARS: No change in hearing, tinnitus, bleeding.  NOSE: No epistaxis, coryza, obstruction.  MOUTH: No dental issues, no gingival bleeding.  CHEST: No cough, no heavyness, no hemoptysis.  HEART: No chest pain, palpitations, syncope, or orthopnea.  ABD: No dysphagia, or melena.  : No hematuria.  MS: No cyanosis x4 extr.  NEUR: No changes in mentation or ataxia.  PSYCH: No changes in thought content.     PHYSICAL EXAMINATION:  GENERAL:  No acute distress, comfortable while sitting.  HEART:  Regular rate and rhythm.  LUNGS:  Clear to auscultation.  ABDOMEN:  Soft, nontender, and nondistended with positive bowel sounds.  LUMBAR: Flexion recreates her bilateral radiculopathy.  Straight leg raise positive bilaterally at 45 degrees with dorsiflexion of the feet.  Sensation shows bilateral hypoesthesia.  Motor strength and deep tendon reflex are normal.  NEURO: CN 2-12 grossly  intact.     DIAGNOSTIC DATA: MRI lumbar spine 6/12/2023: Multilevel disc and facet disease with large right foraminal/extraforaminal extrusion with severe thecal sac stenosis L2-L5.  This is combined with epidural lipomatosis as well as disc and facet disease.     IMPRESSION:  1.  Bilateral lumbar radiculopathy s/p LESIs 2023 & 2024.  2.  Bilateral lumbar facet arthropathy s/p right lumbar RFA 1/2025.  3.  Bilateral knee DJD and pain, s/p bilateral TKAs 2023-4.  4.  Morbid obesity.  5.  +THC on U-tox.     PLAN:  1.  Had LESI #3 at L5/S1, midline, in 2023.  She had both knees replaced uneventfully.  Gradually her lumbar radicular symptoms returned, worse on the left.  S/P repeat series LESI#3.  Radicular symptoms seem improved. May be a candidate for interval injections in the future.  Left lumbar radicular symptoms have returned in the same character and distribution.  Recommend repeat LESI.  If fails to respond, may consider surgical evaluation.  2.  Continued to have bilateral lumbar facet findings, worse on the right.  Exam correlated.  Status post right lumbar RFA with pain at goal.  We will treat the left side conservatively for now.  3.  The entire encounter was in the presence of our pain clinic nurse, Carleen.     Risks, benefits, side effects, and alternatives of therapy were discussed with the patient and they agreed.  The patient voiced understanding of all of the above.     Over half of today's visit was spent educating them about their disease.   Brain TIA

## 2025-03-13 NOTE — ED PROVIDER NOTE - NIH STROKE SCALE: 6B. MOTOR LEG, RIGHT, QM
Visit Information    Have you changed or started any medications since your last visit including any over-the-counter medicines, vitamins, or herbal medicines? no   Are you having any side effects from any of your medications? -  no  Have you stopped taking any of your medications? Is so, why? -  no    Have you seen any other physician or provider since your last visit? No  Have you had any other diagnostic tests since your last visit? No  Have you been seen in the emergency room and/or had an admission to a hospital since we last saw you? No  Have you had your routine dental cleaning in the past 6 months? no    Have you activated your [employer] account? If not, what are your barriers? Yes     Patient Care Team:  Shahnaz Valdes MD as PCP - General (Family Medicine)  Shahnaz Valdes MD as PCP - Empaneled Provider  Deidra Alvarado DO as Consulting Physician (Obstetrics & Gynecology)  Rajesh rOozco MD as Consulting Physician (Gastroenterology)    Medical History Review  Past Medical, Family, and Social History reviewed and does contribute to the patient presenting condition    Health Maintenance   Topic Date Due    DTaP/Tdap/Td vaccine (1 - Tdap) Never done    Respiratory Syncytial Virus (RSV) Pregnant or age 60 yrs+ (1 - Risk 60-74 years 1-dose series) Never done    COVID-19 Vaccine (3 - 2024-25 season) 09/01/2024    Annual Wellness Visit (Medicare Advantage)  01/01/2025    Depression Screen  11/13/2025    Lipids  12/13/2025    Breast cancer screen  02/11/2026    Colorectal Cancer Screen  05/31/2029    DEXA (modify frequency per FRAX score)  Completed    Flu vaccine  Completed    Shingles vaccine  Completed    Pneumococcal 50+ years Vaccine  Completed    Hepatitis C screen  Completed    Hib vaccine  Aged Out    Polio vaccine  Aged Out    Meningococcal (ACWY) vaccine  Aged Out    Meningococcal B vaccine  Aged Out    Hepatitis A vaccine  Discontinued    Hepatitis B vaccine  Discontinued     
          Assessment & Plan  1. Preoperative clearance for right tympanomastoidectomy.  She is scheduled for a right tympanomastoidectomy on 03/28/2025, to be performed by Dr. Marty Elizondo. The procedure aims to reconstruct her ear due to persistent drainage, which has been causing progressive hearing loss. She has no cardiac stents and reports no chest pain or shortness of breath. She has preoperative testing scheduled for the upcoming week. She has been cleared for surgery. She is advised to undergo an EKG and ensure that the results of her preoperative testing are forwarded to this office.    2. Vitamin D deficiency.  Her vitamin D levels are significantly low, increasing her risk for bone fractures. A prescription for vitamin D supplements will be sent to her pharmacy. She is advised to take the supplements as prescribed.    3. Osteoporosis.  She has a history of osteoporosis and has previously taken Fosamax, which is not currently on her medication list. She is due for a DEXA scan to assess her bone density, given her high risk for fractures and vitamin D deficiency.    4. Hypertension.  She is currently taking carvedilol for hypertension but has not been taking amlodipine. She should continue her current medication regimen and follow up with her primary care physician for any necessary adjustments.    5. Aneurysm.  She has a history of an aneurysm, which was managed with coil placement following a fall a few years ago.    1. Pre-operative clearance  Patient has preop clearance ordered, will do EKG.  Papers after the preop testing.  - EKG 12 Lead; Future    2. Limited systemic sclerosis (HCC)  Patient has seen rheumatologist about 2 years ago never followed with them.  Encourage patient to make an appointment with [facility] rheumatologist although patient does have memory problems.    3. Chronic diastolic heart failure (HCC)  Stable no acute symptoms continue current treatment    4. Chronic pancreatitis, unspecified 
(0) No drift; leg holds 30 degree position for full 5 secs

## 2025-04-30 ENCOUNTER — OFFICE (OUTPATIENT)
Dept: URBAN - METROPOLITAN AREA CLINIC 109 | Facility: CLINIC | Age: 60
Setting detail: OPHTHALMOLOGY
End: 2025-04-30
Payer: COMMERCIAL

## 2025-04-30 DIAGNOSIS — H40.023: ICD-10-CM

## 2025-04-30 PROCEDURE — 92083 EXTENDED VISUAL FIELD XM: CPT | Performed by: OPHTHALMOLOGY

## 2025-04-30 PROCEDURE — 92133 CPTRZD OPH DX IMG PST SGM ON: CPT | Performed by: OPHTHALMOLOGY

## 2025-04-30 PROCEDURE — 92012 INTRM OPH EXAM EST PATIENT: CPT | Performed by: OPHTHALMOLOGY

## 2025-04-30 ASSESSMENT — REFRACTION_CURRENTRX
OS_OVR_VA: 20/
OS_SPHERE: +2.25
OD_OVR_VA: 20/
OS_CYLINDER: -0.50
OD_SPHERE: +2.25
OS_AXIS: 79

## 2025-04-30 ASSESSMENT — REFRACTION_MANIFEST
OS_SPHERE: +1.25
OD_VA1: 20/20-
OS_VA1: 20/20-
OD_ADD: +2.00
OD_SPHERE: +2.75
OS_AXIS: 85
OD_SPHERE: +1.25
OS_SPHERE: +2.75
OS_CYLINDER: -0.50
OS_ADD: +2.00

## 2025-04-30 ASSESSMENT — TONOMETRY
OS_IOP_MMHG: 15
OS_IOP_MMHG: 19
OD_IOP_MMHG: 16
OD_IOP_MMHG: 19

## 2025-04-30 ASSESSMENT — PACHYMETRY
OD_CT_CORRECTION: 3
OD_CT_UM: 501
OS_CT_UM: 507
OS_CT_CORRECTION: 3

## 2025-04-30 ASSESSMENT — VISUAL ACUITY
OD_BCVA: 20/40+2
OS_BCVA: 20/30-2

## 2025-04-30 ASSESSMENT — CONFRONTATIONAL VISUAL FIELD TEST (CVF)
OD_FINDINGS: FULL
OS_FINDINGS: FULL

## 2025-07-10 ENCOUNTER — EMERGENCY (EMERGENCY)
Facility: HOSPITAL | Age: 60
LOS: 0 days | Discharge: HOME | End: 2025-07-10
Attending: EMERGENCY MEDICINE
Payer: COMMERCIAL

## 2025-07-10 VITALS
TEMPERATURE: 99 F | OXYGEN SATURATION: 99 % | RESPIRATION RATE: 18 BRPM | HEART RATE: 82 BPM | SYSTOLIC BLOOD PRESSURE: 147 MMHG | DIASTOLIC BLOOD PRESSURE: 82 MMHG

## 2025-07-10 VITALS — HEART RATE: 98 BPM | OXYGEN SATURATION: 99 %

## 2025-07-10 DIAGNOSIS — Z79.84 LONG TERM (CURRENT) USE OF ORAL HYPOGLYCEMIC DRUGS: ICD-10-CM

## 2025-07-10 DIAGNOSIS — Z88.5 ALLERGY STATUS TO NARCOTIC AGENT: ICD-10-CM

## 2025-07-10 DIAGNOSIS — R10.30 LOWER ABDOMINAL PAIN, UNSPECIFIED: ICD-10-CM

## 2025-07-10 DIAGNOSIS — Z90.710 ACQUIRED ABSENCE OF BOTH CERVIX AND UTERUS: Chronic | ICD-10-CM

## 2025-07-10 DIAGNOSIS — R11.10 VOMITING, UNSPECIFIED: ICD-10-CM

## 2025-07-10 DIAGNOSIS — Z86.73 PERSONAL HISTORY OF TRANSIENT ISCHEMIC ATTACK (TIA), AND CEREBRAL INFARCTION WITHOUT RESIDUAL DEFICITS: ICD-10-CM

## 2025-07-10 DIAGNOSIS — R10.815 PERIUMBILIC ABDOMINAL TENDERNESS: ICD-10-CM

## 2025-07-10 DIAGNOSIS — I10 ESSENTIAL (PRIMARY) HYPERTENSION: ICD-10-CM

## 2025-07-10 DIAGNOSIS — R10.816 EPIGASTRIC ABDOMINAL TENDERNESS: ICD-10-CM

## 2025-07-10 DIAGNOSIS — R10.813 RIGHT LOWER QUADRANT ABDOMINAL TENDERNESS: ICD-10-CM

## 2025-07-10 DIAGNOSIS — Z79.85 LONG-TERM (CURRENT) USE OF INJECTABLE NON-INSULIN ANTIDIABETIC DRUGS: ICD-10-CM

## 2025-07-10 DIAGNOSIS — E11.9 TYPE 2 DIABETES MELLITUS WITHOUT COMPLICATIONS: ICD-10-CM

## 2025-07-10 DIAGNOSIS — Z79.01 LONG TERM (CURRENT) USE OF ANTICOAGULANTS: ICD-10-CM

## 2025-07-10 LAB
ALBUMIN SERPL ELPH-MCNC: 3.5 G/DL — SIGNIFICANT CHANGE UP (ref 3.3–5)
ALP SERPL-CCNC: 40 U/L — SIGNIFICANT CHANGE UP (ref 40–120)
ALT FLD-CCNC: 21 U/L — SIGNIFICANT CHANGE UP (ref 12–78)
ANION GAP SERPL CALC-SCNC: 7 MMOL/L — SIGNIFICANT CHANGE UP (ref 5–17)
APPEARANCE UR: CLEAR — SIGNIFICANT CHANGE UP
APTT BLD: 31.8 SEC — SIGNIFICANT CHANGE UP (ref 26.1–36.8)
AST SERPL-CCNC: 16 U/L — SIGNIFICANT CHANGE UP (ref 15–37)
BASOPHILS # BLD AUTO: 0.02 K/UL — SIGNIFICANT CHANGE UP (ref 0–0.2)
BASOPHILS NFR BLD AUTO: 0.2 % — SIGNIFICANT CHANGE UP (ref 0–2)
BILIRUB SERPL-MCNC: 0.3 MG/DL — SIGNIFICANT CHANGE UP (ref 0.2–1.2)
BILIRUB UR-MCNC: NEGATIVE — SIGNIFICANT CHANGE UP
BUN SERPL-MCNC: 14 MG/DL — SIGNIFICANT CHANGE UP (ref 7–23)
CALCIUM SERPL-MCNC: 9.3 MG/DL — SIGNIFICANT CHANGE UP (ref 8.5–10.1)
CHLORIDE SERPL-SCNC: 107 MMOL/L — SIGNIFICANT CHANGE UP (ref 96–108)
CO2 SERPL-SCNC: 28 MMOL/L — SIGNIFICANT CHANGE UP (ref 22–31)
COLOR SPEC: YELLOW — SIGNIFICANT CHANGE UP
CREAT SERPL-MCNC: 0.61 MG/DL — SIGNIFICANT CHANGE UP (ref 0.5–1.3)
DIFF PNL FLD: NEGATIVE — SIGNIFICANT CHANGE UP
EGFR: 104 ML/MIN/1.73M2 — SIGNIFICANT CHANGE UP
EGFR: 104 ML/MIN/1.73M2 — SIGNIFICANT CHANGE UP
EOSINOPHIL # BLD AUTO: 0.09 K/UL — SIGNIFICANT CHANGE UP (ref 0–0.5)
EOSINOPHIL NFR BLD AUTO: 1.1 % — SIGNIFICANT CHANGE UP (ref 0–6)
GLUCOSE SERPL-MCNC: 87 MG/DL — SIGNIFICANT CHANGE UP (ref 70–99)
GLUCOSE UR QL: >=1000 MG/DL
HCT VFR BLD CALC: 39.4 % — SIGNIFICANT CHANGE UP (ref 34.5–45)
HGB BLD-MCNC: 11.8 G/DL — SIGNIFICANT CHANGE UP (ref 11.5–15.5)
IMM GRANULOCYTES NFR BLD AUTO: 0.2 % — SIGNIFICANT CHANGE UP (ref 0–0.9)
INR BLD: 1.21 RATIO — HIGH (ref 0.85–1.16)
KETONES UR QL: ABNORMAL MG/DL
LACTATE SERPL-SCNC: 1 MMOL/L — SIGNIFICANT CHANGE UP (ref 0.7–2)
LEUKOCYTE ESTERASE UR-ACNC: NEGATIVE — SIGNIFICANT CHANGE UP
LIDOCAIN IGE QN: 34 U/L — SIGNIFICANT CHANGE UP (ref 13–75)
LYMPHOCYTES # BLD AUTO: 1.07 K/UL — SIGNIFICANT CHANGE UP (ref 1–3.3)
LYMPHOCYTES # BLD AUTO: 13 % — SIGNIFICANT CHANGE UP (ref 13–44)
MCHC RBC-ENTMCNC: 22.3 PG — LOW (ref 27–34)
MCHC RBC-ENTMCNC: 29.9 G/DL — LOW (ref 32–36)
MCV RBC AUTO: 74.6 FL — LOW (ref 80–100)
MONOCYTES # BLD AUTO: 0.47 K/UL — SIGNIFICANT CHANGE UP (ref 0–0.9)
MONOCYTES NFR BLD AUTO: 5.7 % — SIGNIFICANT CHANGE UP (ref 2–14)
NEUTROPHILS # BLD AUTO: 6.56 K/UL — SIGNIFICANT CHANGE UP (ref 1.8–7.4)
NEUTROPHILS NFR BLD AUTO: 79.8 % — HIGH (ref 43–77)
NITRITE UR-MCNC: NEGATIVE — SIGNIFICANT CHANGE UP
NRBC BLD AUTO-RTO: 0 /100 WBCS — SIGNIFICANT CHANGE UP (ref 0–0)
PH UR: 5.5 — SIGNIFICANT CHANGE UP (ref 5–8)
PLATELET # BLD AUTO: 162 K/UL — SIGNIFICANT CHANGE UP (ref 150–400)
POTASSIUM SERPL-MCNC: 3.9 MMOL/L — SIGNIFICANT CHANGE UP (ref 3.5–5.3)
POTASSIUM SERPL-SCNC: 3.9 MMOL/L — SIGNIFICANT CHANGE UP (ref 3.5–5.3)
PROT SERPL-MCNC: 7.6 GM/DL — SIGNIFICANT CHANGE UP (ref 6–8.3)
PROT UR-MCNC: NEGATIVE MG/DL — SIGNIFICANT CHANGE UP
PROTHROM AB SERPL-ACNC: 13.7 SEC — HIGH (ref 9.9–13.4)
RBC # BLD: 5.28 M/UL — HIGH (ref 3.8–5.2)
RBC # FLD: 15.3 % — HIGH (ref 10.3–14.5)
SODIUM SERPL-SCNC: 142 MMOL/L — SIGNIFICANT CHANGE UP (ref 135–145)
SP GR SPEC: >1.03 — HIGH (ref 1–1.03)
UROBILINOGEN FLD QL: 0.2 MG/DL — SIGNIFICANT CHANGE UP (ref 0.2–1)
WBC # BLD: 8.23 K/UL — SIGNIFICANT CHANGE UP (ref 3.8–10.5)
WBC # FLD AUTO: 8.23 K/UL — SIGNIFICANT CHANGE UP (ref 3.8–10.5)

## 2025-07-10 PROCEDURE — 74177 CT ABD & PELVIS W/CONTRAST: CPT | Mod: 26

## 2025-07-10 PROCEDURE — 99285 EMERGENCY DEPT VISIT HI MDM: CPT

## 2025-07-10 RX ORDER — ONDANSETRON HCL/PF 4 MG/2 ML
1 VIAL (ML) INJECTION
Qty: 1 | Refills: 0
Start: 2025-07-10 | End: 2025-07-12

## 2025-07-10 RX ADMIN — Medication 1000 MILLILITER(S): at 16:47

## 2025-07-10 RX ADMIN — Medication 20 MILLIGRAM(S): at 16:47

## 2025-07-10 NOTE — ED PROVIDER NOTE - NSFOLLOWUPINSTRUCTIONS_ED_ALL_ED_FT
- You were seen in the Emergency Department Today for abdominal pain  - Your lab and imaging results were discussed with you  - Please take the Pepcid 20 mg twice a day, Zofran 4 mg every 8 hours as needed for nausea, over-the-counter Maalox or Mylanta as needed  - Please follow up with your primary care doctor and gastroenterologist as discussed  - Return to the Emergency Department IMMEDIATELY if you experience any new or concerning symptoms including worsening abdominal pain, abdomen, tardive distended, blood in her stool or vomit, you are unable to tolerate food or drink, fevers or chills, lightheadedness or dizziness, he passed out.      Log Out.  [employer]® CareNotes®  :  [facility]        ABDOMINAL PAIN - General Information    Abdominal Pain    WHAT YOU NEED TO KNOW:    What do I need to know about abdominal pain? Abdominal pain may be felt anywhere between the bottom of your rib cage and your groin. Acute pain usually lasts less than 3 months. Chronic pain lasts longer than 3 months. Your pain may be sharp or dull. The pain may stay in the same place or move around. You may have the pain all the time, or it may come and go. Depending on the cause, you may also have nausea, vomiting, fever, or diarrhea.  Abdominal Organs    What causes abdominal pain? The cause may not be found. The following are common causes:    Overeating, gas pains, or food poisoning    Constipation or diarrhea    An injury    Appendicitis, a hernia, or an ulcer    Infection or a blockage    A liver, gallbladder, or kidney condition  How is the cause of abdominal pain diagnosed? Your healthcare provider will check your abdomen. He or she will ask where your pain is and when it started. Tell him or her if the pain wakes you or stops you from doing your daily activities. Describe anything that makes the pain better or worse. You may also need any of the following:    Blood, urine, or bowel movement samples may be tested for signs of an infection, disease, or injury.    X-ray pictures of your abdomen may show an injury or other cause of the pain.  How is abdominal pain treated?    Prescription pain medicine may be given. Ask your healthcare provider how to take this medicine safely. Some prescription pain medicines contain acetaminophen. Do not take other medicines that contain acetaminophen without talking to your healthcare provider. Too much acetaminophen may cause liver damage. Prescription pain medicine may cause constipation. Ask your healthcare provider how to prevent or treat constipation.    Medicines may be given to calm your stomach or prevent vomiting.    Relaxation therapy may be used along with pain medicine.    Surgery may be needed, depending on the cause.  What can I do to manage or prevent abdominal pain?    Apply heat on your abdomen for 20 to 30 minutes every 2 hours for as many days as directed. Heat helps decrease pain and muscle spasms.    Make changes to the foods you eat, if needed. Do not eat foods that cause abdominal pain or other symptoms. Eat small meals more often. The following changes may also help:  Eat more high-fiber foods if you are constipated. High-fiber foods include fruits, vegetables, whole-grain foods, and legumes such as churchill beans.        Do not eat foods that cause gas if you have bloating. Examples include broccoli, cabbage, beans, and carbonated drinks.    Do not eat foods or drinks that contain sorbitol or fructose if you have diarrhea and bloating. Some examples are fruit juices, candy, jelly, and sugar-free gum.    Do not eat high-fat foods. Examples include fried foods, cheeseburgers, hot dogs, and desserts.    Make changes to the liquids you drink, if needed. Do not drink liquids that cause pain or make it worse, such as orange juice. Drink liquids throughout the day to stay hydrated. The following changes may also help:  Drink more liquids to prevent dehydration from diarrhea or vomiting. Ask your healthcare provider how much liquid to drink each day and which liquids are best for you.    Limit or do not have caffeine. Caffeine may make symptoms such as heartburn or nausea worse.    Limit or do not drink alcohol. Alcohol can make your abdominal pain worse. Ask your healthcare provider if it is okay for you to drink alcohol. Also ask how much is okay for you to drink. A drink of alcohol is 12 ounces of beer, ½ ounce of liquor, or 5 ounces of wine.    Keep a diary of your abdominal pain. A diary may help your healthcare provider learn what is causing your pain. Include when the pain happens, how long it lasts, and what the pain feels like. Write down any other symptoms you have with abdominal pain. Also write down what you eat, and any symptoms you have after you eat.    Manage stress. Stress may cause abdominal pain. Your healthcare provider may recommend relaxation techniques and deep breathing exercises to help decrease your stress. Your healthcare provider may recommend you talk to someone about your stress or anxiety, such as a counselor or a friend. Get plenty of sleep. Exercise regularly.  [employer] Family Walking for Exercise      Do not smoke. Nicotine and other chemicals in cigarettes can damage your esophagus and stomach. Ask your healthcare provider for information if you currently smoke and need help to quit. E-cigarettes or smokeless tobacco still contain nicotine. Talk to your healthcare provider before you use these products.  Call your local emergency number (911 in the ) if:    You have chest pain or shortness of breath.    When should I seek immediate care?    You have pulsing pain in your upper abdomen or lower back that suddenly becomes constant.    Your pain is in the right lower abdominal area and worsens with movement.    You have a fever over 100.4°F (38°C) or shaking chills.    You are vomiting and cannot keep food or liquids down.    Your pain does not improve or gets worse over the next 8 to 12 hours.    You see blood in your vomit or bowel movements, or they look black and tarry.    Your skin or the whites of your eyes turn yellow.    You are a woman and have a large amount of vaginal bleeding that is not your monthly period.  When should I call my doctor?    You have pain in your lower back.    You are a man and have pain in your testicles.    You have pain when you urinate.    You have questions or concerns about your condition or care.  CARE AGREEMENT:    You have the right to help plan your care. Learn about your health condition and how it may be treated. Discuss treatment options with your healthcare providers to decide what care you want to receive. You always have the right to refuse treatment.    © [employer] 1973, 2025    	  back to top            © [employer] 1973, 2025

## 2025-07-10 NOTE — ED PROVIDER NOTE - OBJECTIVE STATEMENT
Attending note (Tito):   - History of Present Illness: Ms. Castro presented to the hospital with acute onset of abdominal pain that began this morning. The pain is localized to the middle and lower part of the abdomen. She denies any urinary symptoms such as dysuria, hematuria, or urgency. There is no reported vaginal bleeding or discharge. The patient mentions feeling a little chilly but denies any fever. The pain appears to be constant, and its severity is not explicitly stated. No aggravating or relieving factors were mentioned. The patient denies any recent travel outside the country.    - History: The patient has a past surgical history significant for a  and hysterectomy, both performed some time ago. She has chronic medical conditions including hypertension and diabetes mellitus. Her diabetes is managed with Metformin and 'Jordans' (possibly referring to Jardiance). There is no history of gallstones or kidney stones. The patient reports an allergy to Vicodin, which causes severe nausea.

## 2025-07-10 NOTE — ED PROVIDER NOTE - PROGRESS NOTE DETAILS
SHAAN Salter NP: 60-year-old female past medical history hypertension, diabetes, TIA and Xarelto presenting to ED complaining of 1 day of epigastric/RLQ abdominal pain, nonbloody vomiting.  Patient states it was began this morning.  Denies any diarrhea, fevers, recent travel, lightheadedness or dizziness.  On exam patient well appearing in no acute distress, vital signs stable, afebrile.  Patient states symptoms are improved status post meds, abdomen soft nondistended mildly tender epigastrium on palpation.  Lab results unremarkable, CT No acute intra-abdominal or pelvic pathology, similar appearance to previous CT scan.  Patient states symptoms resolved status post meds.  Will DC patient with outpatient PMD/GI follow-up-patient states has gastroenterologist she can make an appointment with.  Patient updated on results and is agreeable to plan, questions and understanding verbalized, return precautions given.

## 2025-07-10 NOTE — ED ADULT TRIAGE NOTE - CHIEF COMPLAINT QUOTE
C/O Nausea / vomiting / intermitted cramping   abdominal  pain 5/10  started Today no fevers denies constipation or diarrhea   no pmh htn/ hld/dm2 /TIA

## 2025-07-10 NOTE — ED PROVIDER NOTE - PATIENT PORTAL LINK FT
You can access the FollowMyHealth Patient Portal offered by [facility] by registering at the following website: [email or web address]. By joining [employer]’s FollowMyHealth portal, you will also be able to view your health information using other applications (apps) compatible with our system.

## 2025-07-10 NOTE — ED PROVIDER NOTE - NS ED ROS FT
Review of Systems:    -  Genitourinary: Denies dysuria, hematuria, or vaginal bleeding/discharge    -  Constitutional: Reports feeling chilly, denies fever    -  Gastrointestinal: Positive for abdominal pain, denies nausea at present

## 2025-07-10 NOTE — ED PROVIDER NOTE - CLINICAL SUMMARY MEDICAL DECISION MAKING FREE TEXT BOX
Attending note (Tito): Assessment/Plan: The patient presents with acute onset abdominal pain localized to the middle and lower abdomen (periumbilical to RLQ). Given the location and sudden onset, as well as the patient's medical history, several potential causes need to be considered. The improvement of pain with palpation is reassuring, but further investigation is necessary.      - Complete blood count (CBC) to assess for infection or inflammation       - Metabolic panel (BMP) to evaluate electrolyte balance and renal/liver  function       - Urinalysis to rule out urinary tract infection       - CT of the abdomen/pelvis to evaluate for appendicitis or other acute pathology      - Administer intravenous fluids for hydration       - Provide analgesia: considering non-opioid options due to Vicodin allergy       - Administer antiemetic (Zofran) if patient develops nausea       - Obtain urine sample for analysis

## 2025-07-10 NOTE — ED ADULT NURSE NOTE - SUICIDE SCREENING QUESTION 3
Follow up with GYN next week.  Return to the ED for fever, increased redness or swelling or if worse in any way.     No

## 2025-07-10 NOTE — ED PROVIDER NOTE - ATTENDING APP SHARED VISIT CONTRIBUTION OF CARE
Attending note (Tito): Patient seen and evaluated initially by myself, with the my care plan instituted by the advanced care practitioner as detailed above in the medical decision making section. See MDM or progress notes sections for updates to this care plan.  I have reviewed and agree with any additional documentation by NITA Salter.  Patient presenting with abdominal pain; labs and ct of the abdomen/pelvis obtained and reviewed: no acute actionable findings; I agree with the discharge plan as documented above.

## 2025-07-10 NOTE — ED PROVIDER NOTE - PHYSICAL EXAMINATION
On Physical Exam:  General: well appearing, in NAD, speaking clearly in full sentences and without difficulty; cooperative with exam  HEENT: anicteric sclera, airway patent  Neck: no neck tenderness, no nuchal rigidity, no JVD  Cardiac: normal s1, s2; RRR; no MGR  Lungs: CTABL  Abdomen: soft nondistended; + RLQ and periumbilical/suprapubic tenderness; no CVA tenderness b/l  Skin: intact, no rash  Extremities: no peripheral edema, no gross deformities

## 2025-08-01 ENCOUNTER — OFFICE (OUTPATIENT)
Dept: URBAN - METROPOLITAN AREA CLINIC 70 | Facility: CLINIC | Age: 60
Setting detail: OPHTHALMOLOGY
End: 2025-08-01
Payer: COMMERCIAL

## 2025-08-01 DIAGNOSIS — H16.223: ICD-10-CM

## 2025-08-01 PROCEDURE — 99212 OFFICE O/P EST SF 10 MIN: CPT | Performed by: STUDENT IN AN ORGANIZED HEALTH CARE EDUCATION/TRAINING PROGRAM

## 2025-08-01 ASSESSMENT — TEAR BREAK UP TIME (TBUT)
OS_TBUT: 2 SECONDS
OD_TBUT: 2 SECONDS

## 2025-08-01 ASSESSMENT — REFRACTION_AUTOREFRACTION
OS_AXIS: 094
OS_SPHERE: +2.00
OD_AXIS: 077
OD_CYLINDER: -0.75
OS_CYLINDER: -1.00
OD_SPHERE: +2.00

## 2025-08-01 ASSESSMENT — REFRACTION_MANIFEST
OS_VA1: 20/20-
OS_CYLINDER: -0.50
OD_VA1: 20/20-
OD_ADD: +2.00
OD_SPHERE: +2.75
OD_SPHERE: +1.25
OS_SPHERE: +1.25
OS_AXIS: 85
OS_ADD: +2.00
OS_SPHERE: +2.75

## 2025-08-01 ASSESSMENT — PACHYMETRY
OD_CT_UM: 501
OS_CT_CORRECTION: 3
OS_CT_UM: 507
OD_CT_CORRECTION: 3

## 2025-08-01 ASSESSMENT — REFRACTION_CURRENTRX
OD_OVR_VA: 20/
OS_OVR_VA: 20/
OD_SPHERE: +2.25
OS_CYLINDER: -0.50
OS_SPHERE: +2.25
OS_AXIS: 79

## 2025-08-01 ASSESSMENT — LID EXAM ASSESSMENTS
OS_MEIBOMITIS: LLL LUL 1+ 2+
OD_MEIBOMITIS: RLL RUL 1+ 2+

## 2025-08-01 ASSESSMENT — VISUAL ACUITY
OD_BCVA: 20/40
OS_BCVA: 20/40

## 2025-08-01 ASSESSMENT — TONOMETRY
OS_IOP_MMHG: 11
OD_IOP_MMHG: 14

## 2025-08-01 ASSESSMENT — CONFRONTATIONAL VISUAL FIELD TEST (CVF)
OS_FINDINGS: FULL
OD_FINDINGS: FULL